# Patient Record
Sex: FEMALE | Race: WHITE | NOT HISPANIC OR LATINO | Employment: FULL TIME | URBAN - METROPOLITAN AREA
[De-identification: names, ages, dates, MRNs, and addresses within clinical notes are randomized per-mention and may not be internally consistent; named-entity substitution may affect disease eponyms.]

---

## 2017-03-09 ENCOUNTER — OFFICE VISIT (OUTPATIENT)
Dept: FAMILY MEDICINE | Facility: CLINIC | Age: 22
End: 2017-03-09
Payer: COMMERCIAL

## 2017-03-09 VITALS
TEMPERATURE: 97.9 F | HEIGHT: 71 IN | RESPIRATION RATE: 18 BRPM | BODY MASS INDEX: 29.68 KG/M2 | SYSTOLIC BLOOD PRESSURE: 111 MMHG | HEART RATE: 92 BPM | DIASTOLIC BLOOD PRESSURE: 67 MMHG | WEIGHT: 212 LBS | OXYGEN SATURATION: 98 %

## 2017-03-09 DIAGNOSIS — Z30.41 ORAL CONTRACEPTIVE USE: Primary | ICD-10-CM

## 2017-03-09 PROCEDURE — 99213 OFFICE O/P EST LOW 20 MIN: CPT | Performed by: FAMILY MEDICINE

## 2017-03-09 NOTE — PROGRESS NOTES
"  SUBJECTIVE:                                                    Cami Cardenas is a 21 year old female who presents to clinic today for the following health issues:  She is here to follow up on her OCP start.     Clinic on 12/19/16:  \"1. Epistaxis  Recurrent episodes, getting more frequent lately, no other issues with frequent bleeding or easy bruising or bleeding.   Will refer her to ENT for further evaluation. In the meantime, I encouraged her to continue to apply Vaseline to nares to help moisturize and avoid harsh blowing or sniffing.   - OTOLARYNGOLOGY REFERRAL  2. Encounter for initial prescription of contraceptives  She is interested in starting on an OC to help control her periods and cramping and for future birth control if needed. She has not been sexually active yet and no plans in the near future. We discussed the risks, benefits, side effects today. She would like to start OCP. Will start apri. She will plan on returning in 3 months to recheck bp after starting medication.\"      Since starting the BC she is having less cramps, a more even flow, and her periods all within placebo timeframe   Periods have been lating 5-6 days.  Denies headache, abdominal pain, or calf pain.      Problem list and histories reviewed & adjusted, as indicated.  Additional history: as documented    Patient Active Problem List   Diagnosis     Hematoma of leg     Sprain and strain of hip and thigh     Leg pain     Past Surgical History   Procedure Laterality Date     Dental surgery       dental implant       Social History   Substance Use Topics     Smoking status: Never Smoker     Smokeless tobacco: Never Used     Alcohol use No     Family History   Problem Relation Age of Onset     Arthritis Mother      High cholesterol Father          Current Outpatient Prescriptions   Medication Sig Dispense Refill     desogestrel-ethinyl estradiol (APRI) 0.15-30 MG-MCG per tablet Take 1 tablet by mouth daily 84 tablet 3     Allergies " "  Allergen Reactions     Minthoxtachys      Allergic to all mints     Recent Labs   Lab Test  08/14/14   1242   LDL  70   HDL  57   TRIG  92      BP Readings from Last 3 Encounters:   03/09/17 111/67   12/19/16 122/67   08/23/16 110/70    Wt Readings from Last 3 Encounters:   03/09/17 96.2 kg (212 lb)   12/19/16 93.4 kg (206 lb)   08/23/16 92.8 kg (204 lb 8 oz)         Reviewed and updated as needed this visit by clinical staff  Reviewed and updated as needed this visit by Provider    ROS:  See above.    This document serves as a record of the services and decisions personally performed and made by Cristy Pleitez MD. It was created on his/her behalf by Cynthia Busch, trained medical scribe. The creation of this document is based the provider's statements to the medical scribes.    Scribe Cynthia Busch, March 9, 2017  OBJECTIVE:                                                    /67 (BP Location: Left arm, Patient Position: Chair, Cuff Size: Adult Large)  Pulse 92  Temp 97.9  F (36.6  C) (Tympanic)  Resp 18  Ht 1.81 m (5' 11.25\")  Wt 96.2 kg (212 lb)  SpO2 98%  BMI 29.36 kg/m2  Body mass index is 29.36 kg/(m^2).  GENERAL: healthy, alert and no distress    Diagnostic Test Results:  none      ASSESSMENT/PLAN:                                                    1. Oral contraceptive use  She is tolerating the APRI well without any adverse side effects. It is helping control her periods and cramping. Her BP is well within normal range. She denies any headaches.       The information in this document, created by the medical scribe for me, accurately reflects the services I personally performed and the decisions made by me. I have reviewed and approved this document for accuracy prior to leaving the patient care area. 03/09/17    Cristy Pleitez MD  [facility]    "

## 2017-03-09 NOTE — MR AVS SNAPSHOT
"              After Visit Summary   3/9/2017    Cami Cardenas    MRN: [id number]           Patient Information     Date Of Birth          1995        Visit Information        Provider Department      3/9/2017 3:00 PM Cristy Pleitez MD [facility]        Today's Diagnoses     Oral contraceptive use    -  1       Follow-ups after your visit        Who to contact     If you have questions or need follow up information about today's clinic visit or your schedule please contact [facility] directly at [phone].  Normal or non-critical lab and imaging results will be communicated to you by [employer], letter or phone within 4 business days after the clinic has received the results. If you do not hear from us within 7 days, please contact the clinic through [employer] or phone. If you have a critical or abnormal lab result, we will notify you by phone as soon as possible.  Submit refill requests through [employer] or call your pharmacy and they will forward the refill request to us. Please allow 3 business days for your refill to be completed.          Additional Information About Your Visit        MyChart Information     [employer] gives you secure access to your electronic health record. If you see a primary care provider, you can also send messages to your care team and make appointments. If you have questions, please call your primary care clinic.  If you do not have a primary care provider, please call [phone] and they will assist you.        Care EveryWhere ID     This is your Care EveryWhere ID. This could be used by other organizations to access your [place] medical records  [id number]        Your Vitals Were     Pulse Temperature Respirations Height Pulse Oximetry BMI (Body Mass Index)    92 97.9  F (36.6  C) (Tympanic) 18 5' 11.25\" (1.81 m) 98% 29.36 kg/m2       Blood Pressure from Last 3 Encounters:   03/09/17 111/67   12/19/16 122/67   08/23/16 110/70    Weight from Last 3 " Encounters:   03/09/17 212 lb (96.2 kg)   12/19/16 206 lb (93.4 kg)   08/23/16 204 lb 8 oz (92.8 kg)              Today, you had the following     No orders found for display       Primary Care Provider Office Phone # Fax #    Cristy Pleitez [phone]       [facility] [address] [facility] [address]        Thank you!     Thank you for choosing [facility]  for your care. Our goal is always to provide you with excellent care. Hearing back from our patients is one way we can continue to improve our services. Please take a few minutes to complete the written survey that you may receive in the mail after your visit with us. Thank you!             Your Updated Medication List - Protect others around you: Learn how to safely use, store and throw away your medicines at www.disposemymeds.org.          This list is accurate as of: 3/9/17  4:26 PM.  Always use your most recent med list.                   Brand Name Dispense Instructions for use    desogestrel-ethinyl estradiol 0.15-30 MG-MCG per tablet    APRI    84 tablet    Take 1 tablet by mouth daily

## 2017-03-09 NOTE — NURSING NOTE
"Chief Complaint   Patient presents with     Recheck Medication     Birth control        Initial /67 (BP Location: Left arm, Patient Position: Chair, Cuff Size: Adult Large)  Pulse 92  Temp 97.9  F (36.6  C) (Tympanic)  Resp 18  Ht 5' 11.25\" (1.81 m)  Wt 212 lb (96.2 kg)  SpO2 98%  BMI 29.36 kg/m2 Estimated body mass index is 29.36 kg/(m^2) as calculated from the following:    Height as of this encounter: 5' 11.25\" (1.81 m).    Weight as of this encounter: 212 lb (96.2 kg).  Medication Reconciliation: complete       Elicia Casarez MA     "

## 2017-07-08 ENCOUNTER — HEALTH MAINTENANCE LETTER (OUTPATIENT)
Age: 22
End: 2017-07-08

## 2017-08-01 ENCOUNTER — OFFICE VISIT (OUTPATIENT)
Dept: FAMILY MEDICINE | Facility: CLINIC | Age: 22
End: 2017-08-01
Payer: COMMERCIAL

## 2017-08-01 VITALS
OXYGEN SATURATION: 97 % | WEIGHT: 229 LBS | HEART RATE: 124 BPM | BODY MASS INDEX: 32.06 KG/M2 | RESPIRATION RATE: 16 BRPM | SYSTOLIC BLOOD PRESSURE: 124 MMHG | HEIGHT: 71 IN | TEMPERATURE: 98.2 F | DIASTOLIC BLOOD PRESSURE: 75 MMHG

## 2017-08-01 DIAGNOSIS — M79.672 PAIN IN BOTH FEET: ICD-10-CM

## 2017-08-01 DIAGNOSIS — N94.6 DYSMENORRHEA: ICD-10-CM

## 2017-08-01 DIAGNOSIS — Z00.00 ROUTINE GENERAL MEDICAL EXAMINATION AT A HEALTH CARE FACILITY: Primary | ICD-10-CM

## 2017-08-01 DIAGNOSIS — M79.671 PAIN IN BOTH FEET: ICD-10-CM

## 2017-08-01 PROCEDURE — 99395 PREV VISIT EST AGE 18-39: CPT | Performed by: FAMILY MEDICINE

## 2017-08-01 RX ORDER — DESOGESTREL AND ETHINYL ESTRADIOL 0.15-0.03
1 KIT ORAL DAILY
Qty: 84 TABLET | Refills: 3 | Status: SHIPPED | OUTPATIENT
Start: 2017-08-01 | End: 2018-07-24

## 2017-08-01 NOTE — NURSING NOTE
"Chief Complaint   Patient presents with     Physical       Initial /75 (BP Location: Left arm, Patient Position: Sitting, Cuff Size: Adult Large)  Pulse 124  Temp 98.2  F (36.8  C) (Oral)  Resp 16  Ht 5' 11\" (1.803 m)  Wt 229 lb (103.9 kg)  LMP 07/06/2017  SpO2 97%  BMI 31.94 kg/m2 Estimated body mass index is 31.94 kg/(m^2) as calculated from the following:    Height as of this encounter: 5' 11\" (1.803 m).    Weight as of this encounter: 229 lb (103.9 kg).  Medication Reconciliation: complete     sma Gloria  "

## 2017-08-01 NOTE — PATIENT INSTRUCTIONS
1. If your foot pain becomes more bothersome, let me knowyou can schedule with our podiatrist here in clinic.  2. Schedule a visit in January for your PAP.      Preventive Health Recommendations  Female Ages 18 to 25     Yearly exam:     See your health care provider every year in order to  o Review health changes.   o Discuss preventive care.    o Review your medicines if your doctor has prescribed any.      You should be tested each year for STDs (sexually transmitted diseases).       After age 20, talk to your provider about how often you should have cholesterol testing.      Starting at age 21, get a Pap test every three years. If you have an abnormal result, your doctor may have you test more often.      If you are at risk for diabetes, you should have a diabetes test (fasting glucose).     Shots:     Get a flu shot each year.     Get a tetanus shot every 10 years.     Consider getting the shot (vaccine) that prevents cervical cancer (Gardasil).    Nutrition:     Eat at least 5 servings of fruits and vegetables each day.    Eat whole-grain bread, whole-wheat pasta and brown rice instead of white grains and rice.    Talk to your provider about Calcium and Vitamin D.     Lifestyle    Exercise at least 150 minutes a week each week (30 minutes a day, 5 days a week). This will help you control your weight and prevent disease.    Limit alcohol to one drink per day.    No smoking.     Wear sunscreen to prevent skin cancer.    See your dentist every six months for an exam and cleaning.

## 2017-08-01 NOTE — MR AVS SNAPSHOT
After Visit Summary   8/1/2017    Cami Cardenas    MRN: [id number]           Patient Information     Date Of Birth          1995        Visit Information        Provider Department      8/1/2017 3:20 PM Cristy Pleitez MD [facility]        Today's Diagnoses     Routine general medical examination at a health care facility    -  1    Pain in both feet        Dysmenorrhea          Care Instructions    1. If your foot pain becomes more bothersome, let me knowyou can schedule with our podiatrist here in clinic.  2. Schedule a visit in January for your PAP.      Preventive Health Recommendations  Female Ages 18 to 25     Yearly exam:     See your health care provider every year in order to  o Review health changes.   o Discuss preventive care.    o Review your medicines if your doctor has prescribed any.      You should be tested each year for STDs (sexually transmitted diseases).       After age 20, talk to your provider about how often you should have cholesterol testing.      Starting at age 21, get a Pap test every three years. If you have an abnormal result, your doctor may have you test more often.      If you are at risk for diabetes, you should have a diabetes test (fasting glucose).     Shots:     Get a flu shot each year.     Get a tetanus shot every 10 years.     Consider getting the shot (vaccine) that prevents cervical cancer (Gardasil).    Nutrition:     Eat at least 5 servings of fruits and vegetables each day.    Eat whole-grain bread, whole-wheat pasta and brown rice instead of white grains and rice.    Talk to your provider about Calcium and Vitamin D.     Lifestyle    Exercise at least 150 minutes a week each week (30 minutes a day, 5 days a week). This will help you control your weight and prevent disease.    Limit alcohol to one drink per day.    No smoking.     Wear sunscreen to prevent skin cancer.    See your dentist every six months for an exam and  "cleaning.          Follow-ups after your visit        Who to contact     If you have questions or need follow up information about today's clinic visit or your schedule please contact [facility] directly at [phone].  Normal or non-critical lab and imaging results will be communicated to you by MyChart, letter or phone within 4 business days after the clinic has received the results. If you do not hear from us within 7 days, please contact the clinic through [employer] or phone. If you have a critical or abnormal lab result, we will notify you by phone as soon as possible.  Submit refill requests through [employer] or call your pharmacy and they will forward the refill request to us. Please allow 3 business days for your refill to be completed.          Additional Information About Your Visit        MyChart Information     [employer] gives you secure access to your electronic health record. If you see a primary care provider, you can also send messages to your care team and make appointments. If you have questions, please call your primary care clinic.  If you do not have a primary care provider, please call [phone] and they will assist you.        Care EveryWhere ID     This is your Care EveryWhere ID. This could be used by other organizations to access your [place] medical records  [id number]        Your Vitals Were     Pulse Temperature Respirations Height Last Period Pulse Oximetry    124 98.2  F (36.8  C) (Oral) 16 5' 11\" (1.803 m) 07/06/2017 97%    BMI (Body Mass Index)                   31.94 kg/m2            Blood Pressure from Last 3 Encounters:   08/01/17 124/75   03/09/17 111/67   12/19/16 122/67    Weight from Last 3 Encounters:   08/01/17 229 lb (103.9 kg)   03/09/17 212 lb (96.2 kg)   12/19/16 206 lb (93.4 kg)              Today, you had the following     No orders found for display         Where to get your medicines      These medications were sent to [id number] - " [place], [place] - [facility] [address]  [address] [facility] [address]     Phone:  [phone]     desogestrel-ethinyl estradiol 0.15-30 MG-MCG per tablet          Primary Care Provider Office Phone # Fax #    Cristy Pleitez [phone]       [facility] [address]  [facility] [address]        Equal Access to Services     SHAY IVY : Hadii aad ku hadasho Soomaali, waaxda luqadaha, qaybta kaalmada adeegyada, waxay idiin hayaan adeeg kharaheraclio laamber . So [facility] [phone].    ATENCIÓN: Si habla español, tiene a carey disposición servicios gratuitos de asistencia lingüística. [facility] [phone].    We comply with applicable federal civil rights laws and [place] laws. We do not discriminate on the basis of race, color, national origin, age, disability sex, sexual orientation or gender identity.            Thank you!     Thank you for choosing [facility]  for your care. Our goal is always to provide you with excellent care. Hearing back from our patients is one way we can continue to improve our services. Please take a few minutes to complete the written survey that you may receive in the mail after your visit with us. Thank you!             Your Updated Medication List - Protect others around you: Learn how to safely use, store and throw away your medicines at www.disposemymeds.org.          This list is accurate as of: 8/1/17  3:41 PM.  Always use your most recent med list.                   Brand Name Dispense Instructions for use Diagnosis    desogestrel-ethinyl estradiol 0.15-30 MG-MCG per tablet    APRI    84 tablet    Take 1 tablet by mouth daily    Dysmenorrhea

## 2017-08-01 NOTE — PROGRESS NOTES
SUBJECTIVE:   CC: Cami Cardenas is an 22 year old woman who presents for preventive health visit.     Physical   Annual:     Getting at least 3 servings of Calcium per day::  Yes    Bi-annual eye exam::  Yes    Dental care twice a year::  Yes    Sleep apnea or symptoms of sleep apnea::  None    Diet::  Regular (no restrictions)    Frequency of exercise::  2-3 days/week    Duration of exercise::  15-30 minutes    Taking medications regularly::  Not Applicable    Additional concerns today::  YES  Answers for HPI/ROS submitted by the patient on 7/29/2017   PHQ-2 Score: 0      She has never been sexually active and would like to wait and do her PAP in January. Continues to take apri for dysmenorrhea and she is tolerating it well and her periods are much easier.     She is having random foot pain and is wondering if she should get shoe inserts.       Today's PHQ-2 Score:   PHQ-2 ( 1999 Pfizer) 8/1/2017   Q1: Little interest or pleasure in doing things 0   Q2: Feeling down, depressed or hopeless 0   PHQ-2 Score 0   Q1: Little interest or pleasure in doing things -   Q2: Feeling down, depressed or hopeless -   PHQ-2 Score -     Abuse: Current or Past(Physical, Sexual or Emotional)- No  Do you feel safe in your environment - Yes    Social History   Substance Use Topics     Smoking status: Never Smoker     Smokeless tobacco: Never Used     Alcohol use No     The patient does not drink >3 drinks per day nor >7 drinks per week.    Reviewed orders with patient.  Reviewed health maintenance and updated orders accordingly - Yes  BP Readings from Last 3 Encounters:   08/01/17 124/75   03/09/17 111/67   12/19/16 122/67    Wt Readings from Last 3 Encounters:   08/01/17 103.9 kg (229 lb)   03/09/17 96.2 kg (212 lb)   12/19/16 93.4 kg (206 lb)         Patient Active Problem List   Diagnosis     Hematoma of leg     Sprain and strain of hip and thigh     Leg pain     Past Surgical History:   Procedure Laterality Date     DENTAL  "SURGERY      dental implant       Social History   Substance Use Topics     Smoking status: Never Smoker     Smokeless tobacco: Never Used     Alcohol use No     Family History   Problem Relation Age of Onset     Arthritis Mother      High cholesterol Father          Current Outpatient Prescriptions   Medication Sig Dispense Refill     desogestrel-ethinyl estradiol (APRI) 0.15-30 MG-MCG per tablet Take 1 tablet by mouth daily 84 tablet 3     Allergies   Allergen Reactions     Minthoxtachys      Allergic to all mints     Recent Labs   Lab Test  08/14/14   1242   LDL  70   HDL  57   TRIG  92      Mammogram not appropriate for this patient based on age.  Pertinent mammograms are reviewed under the imaging tab.  History of abnormal Pap smear: NO - age 21-29 PAP every 3 years recommended  Last 3 Pap Results: No results found for: PAP  Reviewed and updated as needed this visit by clinical staff  Reviewed and updated as needed this visit by Provider  Past Medical History:   Diagnosis Date     NO ACTIVE PROBLEMS       Past Surgical History:   Procedure Laterality Date     DENTAL SURGERY      dental implant       ROS:   ROS: 10 point ROS neg other than the symptoms noted above in the HPI.     This document serves as a record of the services and decisions personally performed and made by Cristy Pleitez MD. It was created on his/her behalf by Cynthia Busch, trained medical scribe. The creation of this document is based the provider's statements to the medical scribes.    Scribe Cynthia Busch, August 1, 2017  OBJECTIVE:   /75 (BP Location: Left arm, Patient Position: Sitting, Cuff Size: Adult Large)  Pulse 124  Temp 98.2  F (36.8  C) (Oral)  Resp 16  Ht 1.803 m (5' 11\")  Wt 103.9 kg (229 lb)  LMP 07/06/2017  SpO2 97%  BMI 31.94 kg/m2  EXAM:  GENERAL: healthy, alert and no distress  EYES: Eyes grossly normal to inspection, PERRL and conjunctivae and sclerae normal  HENT: ear canals and TM's normal, nose and " "mouth without ulcers or lesions  NECK: no adenopathy, no asymmetry, masses, or scars and thyroid normal to palpation  RESP: lungs clear to auscultation - no rales, rhonchi or wheezes  CV: regular rate and rhythm, normal S1 S2, no S3 or S4, no murmur, click or rub, no peripheral edema and peripheral pulses strong  ABDOMEN: soft, nontender, no hepatosplenomegaly, no masses and bowel sounds normal  MS: no gross musculoskeletal defects noted, no edema  SKIN: no suspicious lesions or rashes  NEURO: Normal strength and tone, mentation intact and speech normal  PSYCH: mentation appears normal, affect normal/bright    ASSESSMENT/PLAN:   1. Routine general medical examination at a health care facility  She would like to delay pap for now and schedule PAP in January.   STD testing not indicated as she in not sexually active.    2. Pain in both feet  She will make sure she has shoes with good arch support, may try inserts. If persistent pain, will schedule with podiatry     3. Dysmenorrhea   Continues APRI.  - desogestrel-ethinyl estradiol (APRI) 0.15-30 MG-MCG per tablet; Take 1 tablet by mouth daily  Dispense: 84 tablet; Refill: 3      COUNSELING:  Reviewed preventive health counseling, as reflected in patient instructions   reports that she has never smoked. She has never used smokeless tobacco.  Estimated body mass index is 31.94 kg/(m^2) as calculated from the following:    Height as of this encounter: 1.803 m (5' 11\").    Weight as of this encounter: 103.9 kg (229 lb).   Weight management plan: diet and exercise    Counseling Resources:  ATP IV Guidelines  Pooled Cohorts Equation Calculator  Breast Cancer Risk Calculator  FRAX Risk Assessment  ICSI Preventive Guidelines  Dietary Guidelines for Americans, 2010  USDA's MyPlate  ASA Prophylaxis  Lung CA Screening    The information in this document, created by the medical scribe for me, accurately reflects the services I personally performed and the decisions made by me. I " have reviewed and approved this document for accuracy. 08/01/17    Cristy Pleitez MD  [facility]

## 2017-08-18 ENCOUNTER — TELEPHONE (OUTPATIENT)
Dept: FAMILY MEDICINE | Facility: CLINIC | Age: 22
End: 2017-08-18

## 2018-05-24 ENCOUNTER — OFFICE VISIT (OUTPATIENT)
Dept: URGENT CARE | Facility: URGENT CARE | Age: 23
End: 2018-05-24
Payer: COMMERCIAL

## 2018-05-24 VITALS
TEMPERATURE: 98.5 F | HEART RATE: 113 BPM | SYSTOLIC BLOOD PRESSURE: 148 MMHG | BODY MASS INDEX: 35.15 KG/M2 | OXYGEN SATURATION: 99 % | DIASTOLIC BLOOD PRESSURE: 88 MMHG | WEIGHT: 252 LBS

## 2018-05-24 DIAGNOSIS — H10.13 ALLERGIC CONJUNCTIVITIS, BILATERAL: Primary | ICD-10-CM

## 2018-05-24 DIAGNOSIS — J30.1 ACUTE SEASONAL ALLERGIC RHINITIS DUE TO POLLEN: ICD-10-CM

## 2018-05-24 PROCEDURE — 99213 OFFICE O/P EST LOW 20 MIN: CPT | Performed by: PHYSICIAN ASSISTANT

## 2018-05-24 RX ORDER — DIPHENHYDRAMINE HCL 25 MG
25-50 TABLET ORAL EVERY 6 HOURS PRN
Qty: 30 TABLET | Refills: 1 | Status: SHIPPED | OUTPATIENT
Start: 2018-05-24

## 2018-05-24 RX ORDER — OLOPATADINE HYDROCHLORIDE 1 MG/ML
1 SOLUTION/ DROPS OPHTHALMIC 2 TIMES DAILY
Qty: 5 ML | Refills: 3 | Status: SHIPPED | OUTPATIENT
Start: 2018-05-24 | End: 2019-08-14

## 2018-05-24 NOTE — MR AVS SNAPSHOT
After Visit Summary   5/24/2018    Cami Cardenas    MRN: [id number]           Patient Information     Date Of Birth          1995        Visit Information        Provider Department      5/24/2018 7:20 PM Pablo Navarrete PA-C [facility]        Today's Diagnoses     Allergic conjunctivitis, bilateral    -  1    Acute seasonal allergic rhinitis due to pollen           Follow-ups after your visit        Who to contact     If you have questions or need follow up information about today's clinic visit or your schedule please contact [facility] directly at [phone].  Normal or non-critical lab and imaging results will be communicated to you by [employer], letter or phone within 4 business days after the clinic has received the results. If you do not hear from us within 7 days, please contact the clinic through [employer] or phone. If you have a critical or abnormal lab result, we will notify you by phone as soon as possible.  Submit refill requests through [employer] or call your pharmacy and they will forward the refill request to us. Please allow 3 business days for your refill to be completed.          Additional Information About Your Visit        MyChart Information     [employer] gives you secure access to your electronic health record. If you see a primary care provider, you can also send messages to your care team and make appointments. If you have questions, please call your primary care clinic.  If you do not have a primary care provider, please call [phone] and they will assist you.        Care EveryWhere ID     This is your Care EveryWhere ID. This could be used by other organizations to access your [place] medical records  [id number]        Your Vitals Were     Pulse Temperature Pulse Oximetry BMI (Body Mass Index)          113 98.5  F (36.9  C) (Tympanic) 99% 35.15 kg/m2         Blood Pressure from Last 3 Encounters:   05/24/18 148/88    08/01/17 124/75   03/09/17 111/67    Weight from Last 3 Encounters:   05/24/18 252 lb (114.3 kg)   08/01/17 229 lb (103.9 kg)   03/09/17 212 lb (96.2 kg)              Today, you had the following     No orders found for display         Today's Medication Changes          These changes are accurate as of 5/24/18  7:56 PM.  If you have any questions, ask your nurse or doctor.               Start taking these medicines.        Dose/Directions    diphenhydrAMINE 25 MG tablet   Commonly known as:  BENADRYL   Used for:  Allergic conjunctivitis, bilateral   Started by:  Pablo Navarrete PA-C        Dose:  25-50 mg   Take 1-2 tablets (25-50 mg) by mouth every 6 hours as needed for itching or allergies   Quantity:  30 tablet   Refills:  1       olopatadine 0.1 % ophthalmic solution   Commonly known as:  PATANOL   Used for:  Allergic conjunctivitis, bilateral   Started by:  Pablo Navarrete PA-C        Dose:  1 drop   Place 1 drop into both eyes 2 times daily   Quantity:  5 mL   Refills:  3            Where to get your medicines      These medications were sent to [facility] [id number] [place] [address] AT [address] [address]     Phone:  [phone]     diphenhydrAMINE 25 MG tablet    olopatadine 0.1 % ophthalmic solution                Primary Care Provider Office Phone # Fax #    Cristy Pleitez -[phone]       [address]  [facility] [address]        Equal Access to Services     [facility] AH: Hadii aad ku hadasho Soomaali, waaxda luqadaha, qaybta kaalmada adeegyada, jose garcia. So [facility] [phone].    ATENCIÓN: Si rositala torsten, tiene a carey disposición servicios gratuitos de asistencia lingüística. Llame al [phone].    We comply with applicable federal civil rights laws and [place] laws. We do not discriminate on the basis of race, color, national origin, age, disability, sex, sexual orientation, or  gender identity.            Thank you!     Thank you for choosing [facility]  for your care. Our goal is always to provide you with excellent care. Hearing back from our patients is one way we can continue to improve our services. Please take a few minutes to complete the written survey that you may receive in the mail after your visit with us. Thank you!             Your Updated Medication List - Protect others around you: Learn how to safely use, store and throw away your medicines at www.disposemymeds.org.          This list is accurate as of 5/24/18  7:56 PM.  Always use your most recent med list.                   Brand Name Dispense Instructions for use Diagnosis    desogestrel-ethinyl estradiol 0.15-30 MG-MCG per tablet    APRI    84 tablet    Take 1 tablet by mouth daily    Dysmenorrhea       diphenhydrAMINE 25 MG tablet    BENADRYL    30 tablet    Take 1-2 tablets (25-50 mg) by mouth every 6 hours as needed for itching or allergies    Allergic conjunctivitis, bilateral       olopatadine 0.1 % ophthalmic solution    PATANOL    5 mL    Place 1 drop into both eyes 2 times daily    Allergic conjunctivitis, bilateral       XYZAL PO

## 2018-05-25 NOTE — PROGRESS NOTES
"SUBJECTIVE:  Chief Complaint:   Chief Complaint   Patient presents with     Urgent Care     Pt in clinic to have eval for bilateral itchy and red eyes.     Eye Problem     History of Present Illness:  Cami Cardenas is a 23 year old female who presents complaining of moderate both eyes redness, eyelid swelling, itching for 1 day(s) with allergy symptoms of nasal congestion for one week.   Onset/timing: sudden.    Associated Signs and Symptoms: nasal drainage  Treatment measures tried include: OTC \"get the red out drops\"  Contact wearer : Yes     Past Medical History:   Diagnosis Date     NO ACTIVE PROBLEMS      Current Outpatient Prescriptions   Medication Sig Dispense Refill     desogestrel-ethinyl estradiol (APRI) 0.15-30 MG-MCG per tablet Take 1 tablet by mouth daily 84 tablet 3     Levocetirizine Dihydrochloride (XYZAL PO)           ROS:  CONSTITUTIONAL:NEGATIVE for fever, chills, change in weight  EYES: POSITIVE for itching bilateral and redness bilateral  ENT/MOUTH: nasal congestion  RESP:NEGATIVE for significant cough or SOB    OBJECTIVE:  /88  Pulse 113  Temp 98.5  F (36.9  C) (Tympanic)  Wt 252 lb (114.3 kg)  SpO2 99%  BMI 35.15 kg/m2  General: no acute distress  Eye exam: right eye abnormal findings: conjunctivitis with erythema, left eye abnormal findings: conjunctivitis with erythema. Both upper and lower palpebrae mildly edematous  Ears: normal canals, TMs bilaterally, normal TM mobility  Nose: NORMAL - no drainage, turbinates normal in size.  Neck: supple, non-tender, free range of motion, no adenopathy    ASSESSMENT:    1. Allergic conjunctivitis, bilateral    - olopatadine (PATANOL) 0.1 % ophthalmic solution; Place 1 drop into both eyes 2 times daily  Dispense: 5 mL; Refill: 3  - diphenhydrAMINE (BENADRYL) 25 MG tablet; Take 1-2 tablets (25-50 mg) by mouth every 6 hours as needed for itching or allergies  Dispense: 30 tablet; Refill: 1    2. Acute seasonal allergic rhinitis due to " pollen      PLAN:  Cold packs for comfort. As above. Follow up primary clinic if not improving over the next week.   See orders in epic

## 2018-06-28 ENCOUNTER — TELEPHONE (OUTPATIENT)
Dept: FAMILY MEDICINE | Facility: CLINIC | Age: 23
End: 2018-06-28

## 2018-07-24 ENCOUNTER — MYC REFILL (OUTPATIENT)
Dept: FAMILY MEDICINE | Facility: CLINIC | Age: 23
End: 2018-07-24

## 2018-07-24 DIAGNOSIS — N94.6 DYSMENORRHEA: ICD-10-CM

## 2018-07-24 NOTE — TELEPHONE ENCOUNTER
"Requested Prescriptions   Pending Prescriptions Disp Refills     desogestrel-ethinyl estradiol (APRI) 0.15-30 MG-MCG per tablet  Last Written Prescription Date:  8/1/2017  Last Fill Quantity: 84 tablet,  # refills: 3   Last Office Visit: 5/24/2018  Future Office Visit:      84 tablet 3     Sig: Take 1 tablet by mouth daily    Contraceptives Protocol Passed    7/24/2018  9:46 AM       Passed - Patient is not a current smoker if age is 35 or older       Passed - Recent (12 mo) or future (30 days) visit within the authorizing provider's specialty    Patient had office visit in the last 12 months or has a visit in the next 30 days with authorizing provider or within the authorizing provider's specialty.  See \"Patient Info\" tab in inbasket, or \"Choose Columns\" in Meds & Orders section of the refill encounter.           Passed - No active pregnancy on record       Passed - No positive pregnancy test in past 12 months          "

## 2018-07-24 NOTE — TELEPHONE ENCOUNTER
Message from [employer]:  Original authorizing provider: Cristy Pleitez MD, MD Cami Cardenas would like a refill of the following medications:  desogestrel-ethinyl estradiol (APRI) 0.15-30 MG-MCG per tablet [Cristy Pleitez MD, MD]    Preferred pharmacy: [facility] [id number] [place] [address] - [address] AT [address]    Comment:  I will run out of my birth control before I can schedule my annual physical (insurances purposes and making sure its exactly a year out, etc.), so I am requesting it now so that I don't lose a month while I wait to make my appointment.

## 2018-07-26 RX ORDER — DESOGESTREL AND ETHINYL ESTRADIOL 0.15-0.03
1 KIT ORAL DAILY
Qty: 84 TABLET | Refills: 0 | Status: SHIPPED | OUTPATIENT
Start: 2018-07-26 | End: 2018-08-13

## 2018-07-26 NOTE — TELEPHONE ENCOUNTER
Prescription approved per [facility] Refill Protocol.  Brianna Snow RN

## 2018-08-13 ENCOUNTER — OFFICE VISIT (OUTPATIENT)
Dept: FAMILY MEDICINE | Facility: CLINIC | Age: 23
End: 2018-08-13
Payer: COMMERCIAL

## 2018-08-13 VITALS
BODY MASS INDEX: 35.7 KG/M2 | RESPIRATION RATE: 20 BRPM | OXYGEN SATURATION: 99 % | TEMPERATURE: 99.2 F | SYSTOLIC BLOOD PRESSURE: 122 MMHG | HEART RATE: 119 BPM | WEIGHT: 256 LBS | DIASTOLIC BLOOD PRESSURE: 73 MMHG

## 2018-08-13 DIAGNOSIS — Z23 ENCOUNTER FOR IMMUNIZATION: ICD-10-CM

## 2018-08-13 DIAGNOSIS — Z00.00 ENCOUNTER FOR ROUTINE ADULT HEALTH EXAMINATION WITHOUT ABNORMAL FINDINGS: Primary | ICD-10-CM

## 2018-08-13 DIAGNOSIS — N94.6 DYSMENORRHEA: ICD-10-CM

## 2018-08-13 PROCEDURE — 90471 IMMUNIZATION ADMIN: CPT | Performed by: FAMILY MEDICINE

## 2018-08-13 PROCEDURE — G0123 SCREEN CERV/VAG THIN LAYER: HCPCS | Performed by: FAMILY MEDICINE

## 2018-08-13 PROCEDURE — 90715 TDAP VACCINE 7 YRS/> IM: CPT | Performed by: FAMILY MEDICINE

## 2018-08-13 PROCEDURE — 99395 PREV VISIT EST AGE 18-39: CPT | Mod: 25 | Performed by: FAMILY MEDICINE

## 2018-08-13 RX ORDER — DESOGESTREL AND ETHINYL ESTRADIOL 0.15-0.03
1 KIT ORAL DAILY
Qty: 84 TABLET | Refills: 3 | Status: SHIPPED | OUTPATIENT
Start: 2018-08-13 | End: 2019-08-14

## 2018-08-13 NOTE — MR AVS SNAPSHOT
After Visit Summary   8/13/2018    Cami Cardenas    MRN: [id number]           Patient Information     Date Of Birth          1995        Visit Information        Provider Department      8/13/2018 2:40 PM Cristy Pleitez MD [facility]        Today's Diagnoses     Encounter for routine adult health examination without abnormal findings    -  1    Dysmenorrhea        Encounter for immunization          Care Instructions      Preventive Health Recommendations  Female Ages 21 to 25     Yearly exam:     See your health care provider every year in order to  o Review health changes.   o Discuss preventive care.    o Review your medicines if your doctor has prescribed any.      You should be tested each year for STDs (sexually transmitted diseases).       Talk to your provider about how often you should have cholesterol testing.      Get a Pap test every three years. If you have an abnormal result, your doctor may have you test more often.      If you are at risk for diabetes, you should have a diabetes test (fasting glucose).     Shots:     Get a flu shot each year.     Get a tetanus shot every 10 years.     Consider getting the shot (vaccine) that prevents cervical cancer (Gardasil).    Nutrition:     Eat at least 5 servings of fruits and vegetables each day.    Eat whole-grain bread, whole-wheat pasta and brown rice instead of white grains and rice.    Get adequate Calcium and Vitamin D.     Lifestyle    Exercise at least 150 minutes a week each week (30 minutes a day, 5 days a week). This will help you control your weight and prevent disease.    Limit alcohol to one drink per day.    No smoking.     Wear sunscreen to prevent skin cancer.    See your dentist every six months for an exam and cleaning.          Follow-ups after your visit        Who to contact     If you have questions or need follow up information about today's clinic visit or your schedule please contact [facility] directly at [phone].  Normal or non-critical lab and imaging results will be communicated to you by MyChart, letter or phone within 4 business days after the clinic has received the results. If you do not hear from us within 7 days, please contact the clinic through [employer] or phone. If you have a critical or abnormal lab result, we will notify you by phone as soon as possible.  Submit refill requests through [employer] or call your pharmacy and they will forward the refill request to us. Please allow 3 business days for your refill to be completed.          Additional Information About Your Visit        [employer] Information     [employer] gives you secure access to your electronic health record. If you see a primary care provider, you can also send messages to your care team and make appointments. If you have questions, please call your primary care clinic.  If you do not have a primary care provider, please call [phone] and they will assist you.        Care EveryWhere ID     This is your Care EveryWhere ID. This could be used by other organizations to access your [place] medical records  [id number]        Your Vitals Were     Pulse Temperature Respirations Last Period Pulse Oximetry BMI (Body Mass Index)    119 99.2  F (37.3  C) (Oral) 20 08/03/2018 (Exact Date) 99% 35.7 kg/m2       Blood Pressure from Last 3 Encounters:   08/13/18 122/73   05/24/18 148/88   08/01/17 124/75    Weight from Last 3 Encounters:   08/13/18 256 lb (116.1 kg)   05/24/18 252 lb (114.3 kg)   08/01/17 229 lb (103.9 kg)              We Performed the Following     Pap imaged thin layer screen only - recommended age 21 - 24 years     TDAP, IM (10 - 64 YRS) - Adacel          Where to get your medicines      These medications were sent to [facility] [address] [address] [address]     Phone:  [phone]     desogestrel-ethinyl estradiol  0.15-30 MG-MCG per tablet          Primary Care Provider Office Phone # Fax #    Cristy Pleitez [phone] [address] [facility] [address]        Equal Access to Services     SHAY IVY : Hadii joana ku ferno Sochristopherali, waaxda luqadaha, qaybta kaalmada adefaheemyada, jose tijerinan xander rose laSmitakelley garcia. So [facility] [phone].    ATENCIÓN: Si habla español, tiene a carey disposición servicios gratuitos de asistencia lingüística. Llame al [phone].    We comply with applicable federal civil rights laws and [place] laws. We do not discriminate on the basis of race, color, national origin, age, disability, sex, sexual orientation, or gender identity.            Thank you!     Thank you for choosing [facility]  for your care. Our goal is always to provide you with excellent care. Hearing back from our patients is one way we can continue to improve our services. Please take a few minutes to complete the written survey that you may receive in the mail after your visit with us. Thank you!             Your Updated Medication List - Protect others around you: Learn how to safely use, store and throw away your medicines at www.disposemymeds.org.          This list is accurate as of 8/13/18  3:22 PM.  Always use your most recent med list.                   Brand Name Dispense Instructions for use Diagnosis    desogestrel-ethinyl estradiol 0.15-30 MG-MCG per tablet    APRI    84 tablet    Take 1 tablet by mouth daily    Dysmenorrhea       diphenhydrAMINE 25 MG tablet    BENADRYL    30 tablet    Take 1-2 tablets (25-50 mg) by mouth every 6 hours as needed for itching or allergies    Allergic conjunctivitis, bilateral       olopatadine 0.1 % ophthalmic solution    PATANOL    5 mL    Place 1 drop into both eyes 2 times daily    Allergic conjunctivitis, bilateral       XYZAL PO

## 2018-08-13 NOTE — PROGRESS NOTES
SUBJECTIVE:   CC: Cami Cardenas is an 23 year old woman who presents for preventive health visit.     Physical   Annual:     Getting at least 3 servings of Calcium per day:  Yes    Bi-annual eye exam:  Yes    Dental care twice a year:  Yes    Sleep apnea or symptoms of sleep apnea:  None    Diet:  Regular (no restrictions)    Frequency of exercise:  1 day/week    Duration of exercise:  30-45 minutes    Taking medications regularly:  Yes    Medication side effects:  None    Additional concerns today:  No            Today's PHQ-2 Score:   PHQ-2 ( 1999 Pfizer) 8/6/2018   Q1: Little interest or pleasure in doing things 0   Q2: Feeling down, depressed or hopeless 0   PHQ-2 Score 0   Q1: Little interest or pleasure in doing things Not at all   Q2: Feeling down, depressed or hopeless Not at all   PHQ-2 Score 0       Abuse: Current or Past(Physical, Sexual or Emotional)- No  Do you feel safe in your environment - Yes    Social History   Substance Use Topics     Smoking status: Never Smoker     Smokeless tobacco: Never Used     Alcohol use No     Alcohol Use 8/6/2018   If you drink alcohol do you typically have greater than 3 drinks per day OR greater than 7 drinks per week? No   No flowsheet data found.    Reviewed orders with patient.  Reviewed health maintenance and updated orders accordingly - Yes  BP Readings from Last 3 Encounters:   08/13/18 122/73   05/24/18 148/88   08/01/17 124/75    Wt Readings from Last 3 Encounters:   08/13/18 256 lb (116.1 kg)   05/24/18 252 lb (114.3 kg)   08/01/17 229 lb (103.9 kg)                  Patient Active Problem List   Diagnosis     Hematoma of leg     Sprain and strain of hip and thigh     Leg pain     Past Surgical History:   Procedure Laterality Date     DENTAL SURGERY      dental implant       Social History   Substance Use Topics     Smoking status: Never Smoker     Smokeless tobacco: Never Used     Alcohol use No     Family History   Problem Relation Age of Onset      Arthritis Mother      High cholesterol Father          Current Outpatient Prescriptions   Medication Sig Dispense Refill     desogestrel-ethinyl estradiol (APRI) 0.15-30 MG-MCG per tablet Take 1 tablet by mouth daily 84 tablet 3     diphenhydrAMINE (BENADRYL) 25 MG tablet Take 1-2 tablets (25-50 mg) by mouth every 6 hours as needed for itching or allergies 30 tablet 1     Levocetirizine Dihydrochloride (XYZAL PO)        olopatadine (PATANOL) 0.1 % ophthalmic solution Place 1 drop into both eyes 2 times daily 5 mL 3     [DISCONTINUED] desogestrel-ethinyl estradiol (APRI) 0.15-30 MG-MCG per tablet Take 1 tablet by mouth daily 84 tablet 0     Allergies   Allergen Reactions     Dust Mites      Minthoxtachys      Allergic to all mints     Pollen Extract      Ragweeds      Recent Labs   Lab Test  08/14/14   1242   LDL  70   HDL  57   TRIG  92        Mammogram not appropriate for this patient based on age.    Pertinent mammograms are reviewed under the imaging tab.  History of abnormal Pap smear: NO - age 21-29 PAP every 3 years recommended     Reviewed and updated as needed this visit by clinical staff  Tobacco  Allergies  Med Hx  Surg Hx  Fam Hx  Soc Hx        Reviewed and updated as needed this visit by Provider        Past Medical History:   Diagnosis Date     NO ACTIVE PROBLEMS       Past Surgical History:   Procedure Laterality Date     DENTAL SURGERY      dental implant     Obstetric History     No data available          Review of Systems   ROS: 10 point ROS neg other than the symptoms noted above in the HPI.      OBJECTIVE:   /73 (BP Location: Left arm, Patient Position: Chair, Cuff Size: Adult Large)  Pulse 119  Temp 99.2  F (37.3  C) (Oral)  Resp 20  Wt 256 lb (116.1 kg)  LMP 08/03/2018 (Exact Date)  SpO2 99%  BMI 35.7 kg/m2  Physical Exam  GENERAL: healthy, alert and no distress  EYES: Eyes grossly normal to inspection, PERRL and conjunctivae and sclerae normal  HENT: ear canals and TM's  "normal, nose and mouth without ulcers or lesions  NECK: no adenopathy, no asymmetry, masses, or scars and thyroid normal to palpation  RESP: lungs clear to auscultation - no rales, rhonchi or wheezes  BREAST: normal without masses, tenderness or nipple discharge and no palpable axillary masses or adenopathy  CV: regular rate and rhythm, normal S1 S2, no S3 or S4, no murmur, click or rub, no peripheral edema and peripheral pulses strong  ABDOMEN: soft, nontender, no hepatosplenomegaly, no masses and bowel sounds normal   (female): normal female external genitalia, normal urethral meatus, vaginal mucosa pink, moist, well rugated, and normal cervix   MS: no gross musculoskeletal defects noted, no edema  SKIN: no suspicious lesions or rashes  NEURO: Normal strength and tone, mentation intact and speech normal  PSYCH: mentation appears normal, affect normal/bright    Diagnostic Test Results:  none     ASSESSMENT/PLAN:   1. Encounter for routine adult health examination without abnormal findings   pap today (low risk as she has never been sexually active)     2. Dysmenorrhea  Improved with OCP, though feels like she has more vaginal tightness or discomfort when inserting anything but small tampon and had some discomfort on exam today (otherwise normal pelvic exam)   - desogestrel-ethinyl estradiol (APRI) 0.15-30 MG-MCG per tablet; Take 1 tablet by mouth daily  Dispense: 84 tablet; Refill: 3    3. Encounter for immunization     - TDAP, IM (10 - 64 YRS) - Adacel    COUNSELING:  Reviewed preventive health counseling, as reflected in patient instructions       BP Readings from Last 1 Encounters:   08/13/18 122/73     Estimated body mass index is 35.7 kg/(m^2) as calculated from the following:    Height as of 8/1/17: 5' 11\" (1.803 m).    Weight as of this encounter: 256 lb (116.1 kg).  Wt Readings from Last 5 Encounters:   08/13/18 256 lb (116.1 kg)   05/24/18 252 lb (114.3 kg)   08/01/17 229 lb (103.9 kg)   03/09/17 212 lb " (96.2 kg)   12/19/16 206 lb (93.4 kg)        Weight management plan: diet and exercise     reports that she has never smoked. She has never used smokeless tobacco.      Counseling Resources:  ATP IV Guidelines  Pooled Cohorts Equation Calculator  Breast Cancer Risk Calculator  FRAX Risk Assessment  ICSI Preventive Guidelines  Dietary Guidelines for Americans, 2010  USDA's MyPlate  ASA Prophylaxis  Lung CA Screening    Cristy Pleitez MD   [facility]

## 2018-08-13 NOTE — NURSING NOTE
Screening Questionnaire for Adult Immunization     Are you sick today?   No    Do you have allergies to medications, food or any vaccine?   No    Have you ever had a serious reaction after receiving a vaccination?   No    Do you have a long-term health problem with heart disease, lung disease,  asthma, kidney disease, diabetes, anemia, metabolic or blood disease?   No    Do you have cancer, leukemia, AIDS, or any immune system problem?   No    Do you take cortisone, prednisone, other steroids, or anticancer drugs, or  have you had any x-ray (radiation) treatments?   No    Have you had a seizure, brain, or other nervous system problem?   No    During the past year, have you received a transfusion of blood or blood       products, or been given a medicine called immune (gamma) globulin?   No    For women: Are you pregnant or is there a chance you could become         pregnant during the next month?   No    Have you received any vaccinations in the past 4 weeks?   No     Immunization questionnaire answers were all negative.      MNVFC doesn't apply on this patient    Per orders of Dr. Pleitez, injection of Adacel tdap given by Nanette Tripp. Patient instructed to remain in clinic for 20 minutes afterwards, and to report any adverse reaction to me immediately.    Prior to injection verified patient identity using patient's name and date of birth.       Screening performed by Nanette Tripp, [facility]

## 2018-08-15 LAB
COPATH REPORT: NORMAL
PAP: NORMAL

## 2019-07-23 ENCOUNTER — TELEPHONE (OUTPATIENT)
Dept: FAMILY MEDICINE | Facility: CLINIC | Age: 24
End: 2019-07-23

## 2019-07-23 NOTE — TELEPHONE ENCOUNTER
Panel Management Review      Patient has the following on her problem list: None      Composite cancer screening  Chart review shows that this patient is due/due soon for the following None  Summary:    Patient is due/failing the following:   Chlamydia screening and PHYSICAL    Action needed:   Patient needs office visit for physical and screening.    Type of outreach:    Sent [employer] message. pt has appt in august, 2019    Questions for provider review:    None                                                                                                                                    Danielle Bermudez CMA on 7/23/2019 at 4:25 PM

## 2019-08-11 ASSESSMENT — ENCOUNTER SYMPTOMS
PARESTHESIAS: 0
PALPITATIONS: 0
HEADACHES: 0
ABDOMINAL PAIN: 0
NAUSEA: 0
EYE PAIN: 0
SORE THROAT: 0
JOINT SWELLING: 0
MYALGIAS: 0
FREQUENCY: 0
ARTHRALGIAS: 0
SHORTNESS OF BREATH: 0
DIARRHEA: 0
HEMATURIA: 0
WEAKNESS: 0
CONSTIPATION: 0
HEARTBURN: 0
DIZZINESS: 0
HEMATOCHEZIA: 0
FEVER: 0
CHILLS: 0
DYSURIA: 0
NERVOUS/ANXIOUS: 0
COUGH: 0
BREAST MASS: 0

## 2019-08-12 ASSESSMENT — ENCOUNTER SYMPTOMS
CONSTIPATION: 0
FREQUENCY: 0
PALPITATIONS: 0
DYSURIA: 0
MYALGIAS: 0
SHORTNESS OF BREATH: 0
PARESTHESIAS: 0
HEADACHES: 0
COUGH: 0
HEMATOCHEZIA: 0
BREAST MASS: 0
JOINT SWELLING: 0
WEAKNESS: 0
SORE THROAT: 0
EYE PAIN: 0
HEARTBURN: 0
DIARRHEA: 0
NERVOUS/ANXIOUS: 0
ABDOMINAL PAIN: 0
DIZZINESS: 0
FEVER: 0
CHILLS: 0
HEMATURIA: 0
ARTHRALGIAS: 0
NAUSEA: 0

## 2019-08-12 NOTE — PATIENT INSTRUCTIONS
Preventive Health Recommendations  Female Ages 21 to 25     Yearly exam:     See your health care provider every year in order to  o Review health changes.   o Discuss preventive care.    o Review your medicines if your doctor has prescribed any.      You should be tested each year for STDs (sexually transmitted diseases).       Talk to your provider about how often you should have cholesterol testing.      Get a Pap test every three years. If you have an abnormal result, your doctor may have you test more often.      If you are at risk for diabetes, you should have a diabetes test (fasting glucose).     Shots:     Get a flu shot each year.     Get a tetanus shot every 10 years.     Consider getting the shot (vaccine) that prevents cervical cancer (Gardasil).    Nutrition:     Eat at least 5 servings of fruits and vegetables each day.    Eat whole-grain bread, whole-wheat pasta and brown rice instead of white grains and rice.    Get adequate Calcium and Vitamin D.     Lifestyle    Exercise at least 150 minutes a week each week (30 minutes a day, 5 days a week). This will help you control your weight and prevent disease.    Limit alcohol to one drink per day.    No smoking.     Wear sunscreen to prevent skin cancer.    See your dentist every six months for an exam and cleaning.  Patient Education     Understanding Hyperhidrosis  Hyperhidrosis is excessive sweating. It s often an ongoing (chronic) condition. Sweating is a normal process. It helps manage body temperature and other processes of the body. But excessive sweating is more than is needed to do this. The symptoms can start when you re a child and continue into adulthood.  How to say it  hy-per-hy-DROH-sis   What causes hyperhidrosis?  In most cases, the cause isn t known. It may be because of a problem with how the nervous system responds to stress. In some cases, it may be caused by another health condition or a medicine. This is known as secondary  hyperhidrosis.  Symptoms of hyperhidrosis  The main symptom of hyperhidrosis is heavy sweating that:  Can cause problems with daily activities and social events  Happens during the day but not at night  May happen with no physical activity  The sweating occurs most often in any or all of these areas:  Bottoms of the feet  Palms  Underarms  In some cases, it may also occur in these areas:  Face  Groin  Scalp  Under the breasts  Treatment for hyperhidrosis  Treatment choices include:  Antiperspirant. An antiperspirant that has 10% to 15% aluminum chloride can block sweat glands and help stop sweating. It comes in creams, sticks, gels, and sprays. You can buy antiperspirant at a drugstore. Or your healthcare provider may give you a stronger prescription antiperspirant that has 20% aluminum chloride. Antiperspirant should be applied to dry skin at night before bed. It needs to be applied every night for a week or two, and then once or twice a week or as needed. This can irritate the skin for some people.  Botulinum toxin. This is a type of medicine. The medicine is injected into the areas with sweat glands. This can help prevent sweat glands from working normally for a few months. You ll need to get injections every few months.  Iontophoresis. This treatment uses electricity to block sweat glands. Moist pads are put on the skin, or your hands or feet are placed in shallow water. Chemicals may be added to the water. An electrical current is sent through fluid. The process is done several times a week until sweating is reduced, and then once a week or as advised.  Surgery. In severe cases, surgery can be done to remove your sweat glands. Or surgery can be done to cut the nerves that send signals to the sweat glands. Either of these types of surgery can stop sweat permanently.  But this can lead to compensatory hyperhidrosis. This means you will start sweating from another part of your body.  Treating another health  condition, or changing a medicine. A health condition or a medicine can cause secondary hyperhidrosis. This can be managed by treating the health condition, or by a change in medicine. Your healthcare provider will talk with you about these options if you have secondary hyperhidrosis.  Oral medicines. There are medicines that can be taken by mouth that will help reduce sweating.  Possible complications of hyperhidrosis  You may have skin problems in the areas where you sweat. The skin may become moist, pale, swollen, and soft enough to rub away easily. This is known as skin maceration. It can lead to loss of skin, pain, and skin infection. You can help prevent this problem by treating your hyperhidrosis and keeping your skin dry as much as possible.  Living with hyperhidrosis  Hyperhidrosis may be caused by or made worse by emotional stress and heat. It can also cause problems with work and social life. You may have stains on your clothes, and not want to shake hands with people. It can be upsetting to cope with the problems of excess sweat. Talk with your healthcare provider about the following:  Support groups  How to prevent skin maceration  Other ways to manage your condition long-term  When to call your healthcare provider  Call your healthcare provider right away if you have any of these:  Symptoms that don t get better, or get worse  New symptoms   Date Last Reviewed: 5/1/2016 2000-2018 [employer]. [address] [place] [address], [place] [address]. All rights reserved. This information is not intended as a substitute for professional medical care. Always follow your healthcare professional's instructions.

## 2019-08-12 NOTE — PROGRESS NOTES
SUBJECTIVE:   CC: Cami Cardenas is an 24 year old woman who presents for preventive health visit.     Healthy Habits:     Getting at least 3 servings of Calcium per day:  Yes    Bi-annual eye exam:  Yes    Dental care twice a year:  Yes    Sleep apnea or symptoms of sleep apnea:  None    Diet:  Regular (no restrictions)    Frequency of exercise:  2-3 days/week    Duration of exercise:  15-30 minutes    Taking medications regularly:  Yes    Medication side effects:  None    PHQ-2 Total Score: 0    Additional concerns today:  Yes    She would like to switch her birth control.  She is also been having some toe pain.    She went to urgent care last may and got some help with allergies for pink eye. Got eye drops. They were helping and she is out.     Wants to try a different birth control pill. Still cramp periods on the April. Still uncomfortable to use tampons. She looked up her symptoms online and from what she could find she thought maybe secondary vaginismus.     Got a fitbit in December and is doing some tracking. Her heart rate has always been on the high side. Her resting heart rate is typically in the 80s to 90s.     She is more physically active now than she used to be. Is a  at the [employer]. Going up and down stairs. Doing at least an hour of exercise a day. Noticed higher heart rate since high school. Not really changing.    Tired of having issues with axillary sweating. Very smelly. The smell is hard to get out of her clothing. She has tried a number of different anti-persperants and they have not helped.     Last concern is her toe. She has been putting this off. In March, she was clearing the alley of ice. She was moving ice chunks and one of them jammed her big toe on the right foot. Since that time, she has mostly had pain when she stubs her toe or bends it. If not wearing socks, barefoot it might hurt. No swelling. No bruising.     Today's PHQ-2 Score:   PHQ-2 ( 1999 Pfizer) 8/11/2019    Q1: Little interest or pleasure in doing things 0   Q2: Feeling down, depressed or hopeless 0   PHQ-2 Score 0   Q1: Little interest or pleasure in doing things Not at all   Q2: Feeling down, depressed or hopeless Not at all   PHQ-2 Score 0       Abuse: Current or Past(Physical, Sexual or Emotional)- No  Do you feel safe in your environment? Yes    Social History     Tobacco Use     Smoking status: Never Smoker     Smokeless tobacco: Never Used   Substance Use Topics     Alcohol use: No     If you drink alcohol do you typically have >3 drinks per day or >7 drinks per week? No     No flowsheet data found.    Reviewed orders with patient.  Reviewed health maintenance and updated orders accordingly - Yes  BP Readings from Last 3 Encounters:   08/14/19 120/82   08/13/18 122/73   05/24/18 148/88    Wt Readings from Last 3 Encounters:   08/14/19 118.8 kg (262 lb)   08/13/18 116.1 kg (256 lb)   05/24/18 114.3 kg (252 lb)                  Patient Active Problem List   Diagnosis     Hematoma of leg     Sprain and strain of hip and thigh     Leg pain     Past Surgical History:   Procedure Laterality Date     DENTAL SURGERY      dental implant       Social History     Tobacco Use     Smoking status: Never Smoker     Smokeless tobacco: Never Used   Substance Use Topics     Alcohol use: No     Family History   Problem Relation Age of Onset     Arthritis Mother      High cholesterol Father          Current Outpatient Medications   Medication Sig Dispense Refill     aluminum chloride (DRYSOL) 20 % external solution Apply topically At Bedtime 60 mL 3     desogestrel-ethinyl estradiol (APRI) 0.15-30 MG-MCG tablet Take 1 tablet by mouth daily 84 tablet 3     diphenhydrAMINE (BENADRYL) 25 MG tablet Take 1-2 tablets (25-50 mg) by mouth every 6 hours as needed for itching or allergies 30 tablet 1     olopatadine (PATANOL) 0.1 % ophthalmic solution Place 1 drop into both eyes 2 times daily 1 Bottle 11     olopatadine (PATANOL) 0.1 %  ophthalmic solution Place 1 drop into both eyes 2 times daily 5 mL 3     Levocetirizine Dihydrochloride (XYZAL PO)        Allergies   Allergen Reactions     Dust Mites      Minthoxtachys      Allergic to all mints     Pollen Extract      Ragweeds      Recent Labs   Lab Test 08/14/14  1242   LDL 70   HDL 57   TRIG 92        Mammogram not appropriate for this patient based on age.    Pertinent mammograms are reviewed under the imaging tab.  History of abnormal Pap smear: NO - age 21-29 PAP every 3 years recommended  PAP / HPV 8/13/2018   PAP NIL     Reviewed and updated as needed this visit by clinical staff  Tobacco  Allergies  Med Hx  Surg Hx  Fam Hx  Soc Hx        Reviewed and updated as needed this visit by Provider        Past Medical History:   Diagnosis Date     NO ACTIVE PROBLEMS       Past Surgical History:   Procedure Laterality Date     DENTAL SURGERY      dental implant       Review of Systems   Constitutional: Negative for chills and fever.   HENT: Negative for congestion, ear pain, hearing loss and sore throat.    Eyes: Negative for pain and visual disturbance.   Respiratory: Negative for cough and shortness of breath.    Cardiovascular: Negative for chest pain, palpitations and peripheral edema.   Gastrointestinal: Negative for abdominal pain, constipation, diarrhea, heartburn, hematochezia and nausea.   Breasts:  Negative for tenderness, breast mass and discharge.   Genitourinary: Negative for dysuria, frequency, genital sores, hematuria, pelvic pain, urgency, vaginal bleeding and vaginal discharge.   Musculoskeletal: Negative for arthralgias, joint swelling and myalgias.   Skin: Negative for rash.   Neurological: Negative for dizziness, weakness, headaches and paresthesias.   Psychiatric/Behavioral: Negative for mood changes. The patient is not nervous/anxious.            OBJECTIVE:   /82 (BP Location: Left arm, Patient Position: Sitting, Cuff Size: Adult Regular)   Pulse 99   Temp 99.1  " F (37.3  C) (Oral)   Resp 18   Ht 1.803 m (5' 11\")   Wt 118.8 kg (262 lb)   LMP 07/01/2019 (Exact Date)   SpO2 100%   BMI 36.54 kg/m     Wt Readings from Last 5 Encounters:   08/14/19 118.8 kg (262 lb)   08/13/18 116.1 kg (256 lb)   05/24/18 114.3 kg (252 lb)   08/01/17 103.9 kg (229 lb)   03/09/17 96.2 kg (212 lb)      Physical Exam  GENERAL: healthy, alert and no distress  EYES: Eyes grossly normal to inspection, PERRL and conjunctivae and sclerae normal  HENT: ear canals and TM's normal, nose and mouth without ulcers or lesions  NECK: no adenopathy, no asymmetry, masses, or scars and thyroid normal to palpation  RESP: lungs clear to auscultation - no rales, rhonchi or wheezes  BREAST: normal without masses, tenderness or nipple discharge and no palpable axillary masses or adenopathy  CV: regular rate and rhythm, normal S1 S2, no S3 or S4, no murmur, click or rub, no peripheral edema and peripheral pulses strong  ABDOMEN: soft, nontender, no hepatosplenomegaly, no masses and bowel sounds normal  MS: no gross musculoskeletal defects noted, no edema  SKIN: no suspicious lesions or rashes  NEURO: Normal strength and tone, mentation intact and speech normal  PSYCH: mentation appears normal, affect normal/bright    Diagnostic Test Results:  Labs reviewed in Epic    ASSESSMENT/PLAN:   1. Routine general medical examination at a health care facility   pap due in 2021  - Glucose; Future  - Lipid panel reflex to direct LDL Fasting; Future    2. Allergic conjunctivitis, bilateral   patanol was helpful for her symptoms previously. Not currently having symptoms. Refilled for future use prn.   - olopatadine (PATANOL) 0.1 % ophthalmic solution; Place 1 drop into both eyes 2 times daily  Dispense: 1 Bottle; Refill: 11    3. Hyperhidrosis of axilla  Will check TSH today. See pt instructions. She would like to try drysol. Discussed instructions for use. If persistent despite drysol or if intolerant due to skin irritation, " "will schedule with derm and could discuss other possible treatments such as botox.   - DERMATOLOGY REFERRAL  - aluminum chloride (DRYSOL) 20 % external solution; Apply topically At Bedtime  Dispense: 60 mL; Refill: 3  - TSH with free T4 reflex    4. Dysmenorrhea  Improved, but not resolved with current OCP.   - desogestrel-ethinyl estradiol (APRI) 0.15-30 MG-MCG tablet; Take 1 tablet by mouth daily  Dispense: 84 tablet; Refill: 3  - OB/GYN REFERRAL    5. Vaginal discomfort  Symptoms of vaginismus with tampon insertion. She has never been sexually active. She wonders whether a different choice of OCP would be helpful. I recommended discussing options with gyn.  Referred to gyn.     6. Pain of toe of right foot  Wants to avoid additional visit for now with podiatry. Encouraged firm soled shoe over the coming weeks, ice if pain is flaring, let us know if worsening. If persistent symptoms, recommended scheduling with podiatry.      COUNSELING:  Reviewed preventive health counseling, as reflected in patient instructions    Estimated body mass index is 36.54 kg/m  as calculated from the following:    Height as of this encounter: 1.803 m (5' 11\").    Weight as of this encounter: 118.8 kg (262 lb).    Weight management plan: Discussed healthy diet and exercise guidelines     reports that she has never smoked. She has never used smokeless tobacco.      Counseling Resources:  ATP IV Guidelines  Pooled Cohorts Equation Calculator  Breast Cancer Risk Calculator  FRAX Risk Assessment  ICSI Preventive Guidelines  Dietary Guidelines for Americans, 2010  USDA's MyPlate  ASA Prophylaxis  Lung CA Screening    Cristy Pleitez MD, MD  [facility]  "

## 2019-08-14 ENCOUNTER — OFFICE VISIT (OUTPATIENT)
Dept: FAMILY MEDICINE | Facility: CLINIC | Age: 24
End: 2019-08-14
Payer: COMMERCIAL

## 2019-08-14 VITALS
WEIGHT: 262 LBS | OXYGEN SATURATION: 100 % | TEMPERATURE: 99.1 F | DIASTOLIC BLOOD PRESSURE: 82 MMHG | HEIGHT: 71 IN | SYSTOLIC BLOOD PRESSURE: 120 MMHG | HEART RATE: 99 BPM | RESPIRATION RATE: 18 BRPM | BODY MASS INDEX: 36.68 KG/M2

## 2019-08-14 DIAGNOSIS — M79.674 PAIN OF TOE OF RIGHT FOOT: ICD-10-CM

## 2019-08-14 DIAGNOSIS — N94.6 DYSMENORRHEA: ICD-10-CM

## 2019-08-14 DIAGNOSIS — L74.510 HYPERHIDROSIS OF AXILLA: ICD-10-CM

## 2019-08-14 DIAGNOSIS — Z00.00 ROUTINE GENERAL MEDICAL EXAMINATION AT A HEALTH CARE FACILITY: Primary | ICD-10-CM

## 2019-08-14 DIAGNOSIS — H10.13 ALLERGIC CONJUNCTIVITIS, BILATERAL: ICD-10-CM

## 2019-08-14 DIAGNOSIS — N94.9 VAGINAL DISCOMFORT: ICD-10-CM

## 2019-08-14 PROCEDURE — 99214 OFFICE O/P EST MOD 30 MIN: CPT | Mod: 25 | Performed by: FAMILY MEDICINE

## 2019-08-14 PROCEDURE — 84443 ASSAY THYROID STIM HORMONE: CPT | Performed by: FAMILY MEDICINE

## 2019-08-14 PROCEDURE — 99395 PREV VISIT EST AGE 18-39: CPT | Performed by: FAMILY MEDICINE

## 2019-08-14 PROCEDURE — 36415 COLL VENOUS BLD VENIPUNCTURE: CPT | Performed by: FAMILY MEDICINE

## 2019-08-14 RX ORDER — OLOPATADINE HYDROCHLORIDE 1 MG/ML
1 SOLUTION/ DROPS OPHTHALMIC 2 TIMES DAILY
Qty: 1 BOTTLE | Refills: 11 | Status: SHIPPED | OUTPATIENT
Start: 2019-08-14 | End: 2020-05-29

## 2019-08-14 RX ORDER — DESOGESTREL AND ETHINYL ESTRADIOL 0.15-0.03
1 KIT ORAL DAILY
Qty: 84 TABLET | Refills: 3 | Status: SHIPPED | OUTPATIENT
Start: 2019-08-14 | End: 2020-04-15

## 2019-08-14 ASSESSMENT — MIFFLIN-ST. JEOR: SCORE: 2039.55

## 2019-08-15 LAB — TSH SERPL DL<=0.005 MIU/L-ACNC: 0.84 MU/L (ref 0.4–4)

## 2019-08-15 NOTE — RESULT ENCOUNTER NOTE
Excellent! Please call or sent a [employer] message if you have any questions. Cristy Pleitez M.D.

## 2019-09-04 ENCOUNTER — OFFICE VISIT (OUTPATIENT)
Dept: OBGYN | Facility: CLINIC | Age: 24
End: 2019-09-04
Payer: COMMERCIAL

## 2019-09-04 VITALS
BODY MASS INDEX: 36.4 KG/M2 | SYSTOLIC BLOOD PRESSURE: 132 MMHG | DIASTOLIC BLOOD PRESSURE: 90 MMHG | HEART RATE: 110 BPM | WEIGHT: 261 LBS | OXYGEN SATURATION: 98 %

## 2019-09-04 DIAGNOSIS — R10.2 VAGINAL PAIN: ICD-10-CM

## 2019-09-04 DIAGNOSIS — Z00.00 ROUTINE GENERAL MEDICAL EXAMINATION AT A HEALTH CARE FACILITY: ICD-10-CM

## 2019-09-04 DIAGNOSIS — N92.4 EXCESSIVE BLEEDING IN PREMENOPAUSAL PERIOD: Primary | ICD-10-CM

## 2019-09-04 LAB
CHOLEST SERPL-MCNC: 192 MG/DL
GLUCOSE SERPL-MCNC: 93 MG/DL (ref 70–99)
HDLC SERPL-MCNC: 61 MG/DL
LDLC SERPL CALC-MCNC: 105 MG/DL
NONHDLC SERPL-MCNC: 131 MG/DL
TRIGL SERPL-MCNC: 130 MG/DL

## 2019-09-04 PROCEDURE — 36415 COLL VENOUS BLD VENIPUNCTURE: CPT | Performed by: FAMILY MEDICINE

## 2019-09-04 PROCEDURE — 99203 OFFICE O/P NEW LOW 30 MIN: CPT | Performed by: OBSTETRICS & GYNECOLOGY

## 2019-09-04 PROCEDURE — 80061 LIPID PANEL: CPT | Performed by: FAMILY MEDICINE

## 2019-09-04 PROCEDURE — 82947 ASSAY GLUCOSE BLOOD QUANT: CPT | Performed by: FAMILY MEDICINE

## 2019-09-04 NOTE — PROGRESS NOTES
SUBJECTIVE:  Cami Cardenas is a 24 year old female who presents to evaluate heavy vaginal bleeding.  She began her current oral contraceptive pills (Apri) December 2016, in an attempt to improve painful heavy periods.  She has had some success, but still has quite heavy menses, can require a pad or tampon changes every 3 hours.  She had continuous bleeding and cramping all through the month of May.  She has found that inserting a tampon has become increasingly uncomfortable, when she had a speculum examination last year (Pap was normal 8/13/2018) she had discomfort at the speculum and away she had not previously experienced.  She wonders about vaginismus.  She is not sexually active.    OBJECTIVE: BP (!) 132/90   Pulse 110   Wt 118.4 kg (261 lb)   LMP 08/29/2019   SpO2 98%   Breastfeeding? No   BMI 36.40 kg/m       Pelvic exam:  External genitalia appeared normal without lesion.  Urethral meatus, urethra, bladder, perineum, and anus appeared normal. Cervix and vagina appeared normal, without lesion.  Bimanual exam revealed a normal sized non-tender uterus, with no adnexal masses.  Rectovaginal deferred.   She was able to tolerate a narrow adolescent speculum with minimal discomfort, tolerated a single digit internal exam also with minimal discomfort, but did display some suggestion of vaginismus.    ASSESSMENT: Menorrhagia.  Vaginal pain.    PLAN: We discussed the above.  She will schedule a pelvic ultrasound and will then return to clinic to reevaluate.  We did not review oral contraceptive risk factors and would need to do this if she does decide to continue the pills.  We discussed treatment of vaginismus with dilators, use of lubricants which might be helpful.    Please note greater than 50% of this 30 minute appointment were spent in counseling with the patient of the issues described above in the history of present illness and in the plan, including evaluation and managment of menorrhagia,  vulvovaginal pain.

## 2019-09-05 NOTE — RESULT ENCOUNTER NOTE
The results of your recent lipid (cholesterol) profile were normal for your age.    Here are the results:  Lab Results       Component                Value               Date                       CHOL                     192                 09/04/2019            Lab Results       Component                Value               Date                       HDL                      61                  09/04/2019            Lab Results       Component                Value               Date                       LDL                      105                 09/04/2019            Lab Results       Component                Value               Date                       TRIG                     130                 09/04/2019            Lab Results       Component                Value               Date                       CHOLHDLRATIO             2.5                 08/14/2014              Desired or goal levels are:  CHOLESTEROL: Desirable is less than 200.   HDL (Good Cholesterol): Desirable is greater than 40 (for men) greater than 50 (for women).  LDL (Bad Cholesterol): Desirable is less than 130 (or less than 100 if you have heart disease or diabetes). Borderline 130-160.  TRIGLYCERIDES: Desirable is less than 150.  Borderline is 150-200.    Keep up the good work!.    As you may know, an elevated cholesterol is one factor that increases your risk for heart disease and stroke. You can improve your cholesterol by controlling the amount and type of fat you eat and by increasing your daily activity level.    Here are some ways to improve your nutrition:  Eat less fat (especially butter, Crisco and other saturated fats)  Buy lean cuts of meat, reduce your portions of red meat or substitute poultry or fish  Use skim milk and low-fat dairy products  Eat no more than 4 egg yolks per week  Avoid fried or fast foods that are high in fat  Eat more fruits and vegetables      Also consider starting or increasing your aerobic  activity. Aerobic activity is the best way to improve HDL (good) cholesterol. If this would be new to you, please talk with me first about what activities are safe for you.      Other lab results:    Your glucose (blood sugar) was normal.      Please feel free to contact us with any questions or if you would like more information.    Cristy Pleitez M.D.

## 2019-09-17 ENCOUNTER — ANCILLARY PROCEDURE (OUTPATIENT)
Dept: ULTRASOUND IMAGING | Facility: CLINIC | Age: 24
End: 2019-09-17
Attending: OBSTETRICS & GYNECOLOGY
Payer: COMMERCIAL

## 2019-09-17 DIAGNOSIS — N92.4 EXCESSIVE BLEEDING IN PREMENOPAUSAL PERIOD: ICD-10-CM

## 2019-09-17 PROCEDURE — 76856 US EXAM PELVIC COMPLETE: CPT | Performed by: OBSTETRICS & GYNECOLOGY

## 2019-09-26 ENCOUNTER — OFFICE VISIT (OUTPATIENT)
Dept: OBGYN | Facility: CLINIC | Age: 24
End: 2019-09-26
Payer: COMMERCIAL

## 2019-09-26 VITALS
WEIGHT: 267 LBS | BODY MASS INDEX: 37.24 KG/M2 | HEART RATE: 150 BPM | DIASTOLIC BLOOD PRESSURE: 81 MMHG | SYSTOLIC BLOOD PRESSURE: 124 MMHG | OXYGEN SATURATION: 98 %

## 2019-09-26 DIAGNOSIS — N92.4 EXCESSIVE BLEEDING IN PREMENOPAUSAL PERIOD: Primary | ICD-10-CM

## 2019-09-26 PROCEDURE — 99214 OFFICE O/P EST MOD 30 MIN: CPT | Performed by: OBSTETRICS & GYNECOLOGY

## 2019-09-26 NOTE — PROGRESS NOTES
SUBJECTIVE:  Cami Cardenas is a 24 year old female who presents to evaluate abnormal bleeding.  See prior GYN note.  She has been using cyclic oral contraceptive pills, but periods continue to be heavy with quite a bit of cramping prior to menses.  Question of endometriosis has been raised.  She has no risk factors to preclude use of combination oral contraceptive pills.    We reviewed the ultrasound done 9/17/2019.  The uterus was normal.  The endometrium measured 3.7 mm.  Both ovaries were normal..    OBJECTIVE: /81   Pulse 150   Wt 121.1 kg (267 lb)   LMP 09/26/2019   SpO2 98%   BMI 37.24 kg/m   Patient was not otherwise examined.      ASSESSMENT: Menorrhagia, dysmenorrhea.    PLAN: We discussed options available for treatment.  We discussed endometriosis.  She will begin taking the oral contraceptive pills continuously rather than cyclically, and will observe over the next couple of months.  If this does not seem to be helpful (or if an irregular bleeding pattern is too concerning) would consider Depo-Provera.  She will let me know if she wishes to change in treatment.    Please note greater than 50% of this 25 minute appointment were spent in counseling with the patient of the issues described above in the history of present illness and in the plan, including evaluation and managment of menorrhagia, dysmenorrhea.

## 2019-10-30 ENCOUNTER — ALLIED HEALTH/NURSE VISIT (OUTPATIENT)
Dept: NURSING | Facility: CLINIC | Age: 24
End: 2019-10-30
Payer: COMMERCIAL

## 2019-10-30 DIAGNOSIS — Z23 NEED FOR PROPHYLACTIC VACCINATION AND INOCULATION AGAINST INFLUENZA: Primary | ICD-10-CM

## 2019-10-30 PROCEDURE — 99207 ZZC NO CHARGE NURSE ONLY: CPT

## 2019-10-30 PROCEDURE — 90471 IMMUNIZATION ADMIN: CPT

## 2019-10-30 PROCEDURE — 90686 IIV4 VACC NO PRSV 0.5 ML IM: CPT

## 2020-04-24 ENCOUNTER — TELEPHONE (OUTPATIENT)
Dept: OBGYN | Facility: CLINIC | Age: 25
End: 2020-04-24

## 2020-04-24 NOTE — TELEPHONE ENCOUNTER
Prior Authorization Retail Medication Request    Medication/Dose: desogestrel-ethinyl estradiol (APRI) 0.15-30 MG-MCG tablet; Take 1 tablet by mouth daily Active tablets only to prevent menses   ICD code (if different than what is on RX): Dysmenorrhea [N94.6]   Previously Tried and Failed: Apri Taken cyclically  Rationale:  Patient has been taking the oral contraceptive pills continuously rather than cyclically since 9/26/19    Insurance Name:  [employer]  Insurance ID: [id number]      Pharmacy Information (if different than what is on RX)  Name:  [facility]  Phone:  [phone]

## 2020-04-24 NOTE — TELEPHONE ENCOUNTER
Prior Authorization Not Needed per Insurance    Medication: desogestrel-ethinyl estradiol (APRI) 0.15-30 MG-MCG tablet  Insurance Company: [employer] - Phone [phone] Fax [phone]  Expected CoPay:      Pharmacy Filling the Rx: [id number] - [place], [place] - [facility] [facility] [address] [facility]  Pharmacy Notified: Yes  Patient Notified: Yes    Pharmacy filled an 84 day supply of 112 tablets in March.  Currently refill too soon

## 2020-04-24 NOTE — TELEPHONE ENCOUNTER
TC to patient. A new prescription was sent on 4/15/2020 with different directions for patient to take active tablets only to prevent menses. Pt has only had a prescription for 84 tabs prior to this. The PA is to get the insurance to cover the new prescription as patient needs to take tablets continuously. Please submit prior auth for this reason. Pt is going to run out of current tablets before the insurance will refill next prescription. That is why a new prescription was sent with different directions.     Chitra Jordan, ROMIE-BSN

## 2020-04-24 NOTE — TELEPHONE ENCOUNTER
Insurance states pharmacy will need to do a one time override to allow it to fill earlier.  Per insurance the medication is covered and shouldn't need a PA going forward with new directions. Spoke with pharmacy and they will contact insurance company

## 2020-05-28 ENCOUNTER — MYC MEDICAL ADVICE (OUTPATIENT)
Dept: FAMILY MEDICINE | Facility: CLINIC | Age: 25
End: 2020-05-28

## 2020-05-28 DIAGNOSIS — N94.6 DYSMENORRHEA: ICD-10-CM

## 2020-05-28 DIAGNOSIS — H10.13 ALLERGIC CONJUNCTIVITIS, BILATERAL: ICD-10-CM

## 2020-05-29 RX ORDER — DESOGESTREL AND ETHINYL ESTRADIOL 0.15-0.03
1 KIT ORAL DAILY
Qty: 112 TABLET | Refills: 0 | Status: SHIPPED | OUTPATIENT
Start: 2020-05-29 | End: 2020-09-30

## 2020-05-29 RX ORDER — OLOPATADINE HYDROCHLORIDE 1 MG/ML
1 SOLUTION/ DROPS OPHTHALMIC 2 TIMES DAILY
Qty: 1 BOTTLE | Refills: 3 | Status: SHIPPED | OUTPATIENT
Start: 2020-05-29 | End: 2020-09-30

## 2020-10-19 ASSESSMENT — ENCOUNTER SYMPTOMS
HEMATURIA: 0
EYE PAIN: 0
ARTHRALGIAS: 0
JOINT SWELLING: 0
CONSTIPATION: 0
SHORTNESS OF BREATH: 0
HEMATOCHEZIA: 0
MYALGIAS: 0
HEADACHES: 0
NERVOUS/ANXIOUS: 0
BREAST MASS: 0
NAUSEA: 0
PALPITATIONS: 0
FREQUENCY: 0
ABDOMINAL PAIN: 0
CHILLS: 0
DIZZINESS: 0
SORE THROAT: 0
FEVER: 0
PARESTHESIAS: 0
DYSURIA: 0
DIARRHEA: 0
HEARTBURN: 0
COUGH: 0
WEAKNESS: 0

## 2020-10-19 NOTE — PATIENT INSTRUCTIONS
Preventive Health Recommendations  Female Ages 21 to 25     Yearly exam:     See your health care provider every year in order to  o Review health changes.   o Discuss preventive care.    o Review your medicines if your doctor has prescribed any.      You should be tested each year for STDs (sexually transmitted diseases).       Talk to your provider about how often you should have cholesterol testing.      Get a Pap test every three years. If you have an abnormal result, your doctor may have you test more often.      If you are at risk for diabetes, you should have a diabetes test (fasting glucose).     Shots:     Get a flu shot each year.     Get a tetanus shot every 10 years.     Consider getting the shot (vaccine) that prevents cervical cancer (Gardasil).    Nutrition:     Eat at least 5 servings of fruits and vegetables each day.    Eat whole-grain bread, whole-wheat pasta and brown rice instead of white grains and rice.    Get adequate Calcium and Vitamin D.     Lifestyle    Exercise at least 150 minutes a week each week (30 minutes a day, 5 days a week). This will help you control your weight and prevent disease.    Limit alcohol to one drink per day.    No smoking.     Wear sunscreen to prevent skin cancer.    See your dentist every six months for an exam and cleaning.

## 2020-10-20 ENCOUNTER — OFFICE VISIT (OUTPATIENT)
Dept: FAMILY MEDICINE | Facility: CLINIC | Age: 25
End: 2020-10-20
Payer: COMMERCIAL

## 2020-10-20 VITALS
BODY MASS INDEX: 41.02 KG/M2 | SYSTOLIC BLOOD PRESSURE: 136 MMHG | WEIGHT: 293 LBS | HEART RATE: 106 BPM | TEMPERATURE: 98 F | OXYGEN SATURATION: 96 % | HEIGHT: 71 IN | DIASTOLIC BLOOD PRESSURE: 82 MMHG

## 2020-10-20 DIAGNOSIS — E66.813 CLASS 3 SEVERE OBESITY IN ADULT, UNSPECIFIED BMI, UNSPECIFIED OBESITY TYPE, UNSPECIFIED WHETHER SERIOUS COMORBIDITY PRESENT (H): ICD-10-CM

## 2020-10-20 DIAGNOSIS — L74.510 HYPERHIDROSIS OF AXILLA: ICD-10-CM

## 2020-10-20 DIAGNOSIS — E66.01 CLASS 3 SEVERE OBESITY IN ADULT, UNSPECIFIED BMI, UNSPECIFIED OBESITY TYPE, UNSPECIFIED WHETHER SERIOUS COMORBIDITY PRESENT (H): ICD-10-CM

## 2020-10-20 DIAGNOSIS — Z00.00 ROUTINE GENERAL MEDICAL EXAMINATION AT A HEALTH CARE FACILITY: ICD-10-CM

## 2020-10-20 DIAGNOSIS — E66.01 MORBID OBESITY (H): ICD-10-CM

## 2020-10-20 DIAGNOSIS — Z00.00 ENCOUNTER FOR ROUTINE ADULT HEALTH EXAMINATION WITHOUT ABNORMAL FINDINGS: Primary | ICD-10-CM

## 2020-10-20 DIAGNOSIS — N94.6 DYSMENORRHEA: ICD-10-CM

## 2020-10-20 PROCEDURE — 99395 PREV VISIT EST AGE 18-39: CPT | Performed by: FAMILY MEDICINE

## 2020-10-20 PROCEDURE — 99213 OFFICE O/P EST LOW 20 MIN: CPT | Mod: 25 | Performed by: FAMILY MEDICINE

## 2020-10-20 RX ORDER — DESOGESTREL AND ETHINYL ESTRADIOL 0.15-0.03
1 KIT ORAL DAILY
Qty: 112 TABLET | Refills: 11 | Status: SHIPPED | OUTPATIENT
Start: 2020-10-20 | End: 2020-12-13

## 2020-10-20 ASSESSMENT — ENCOUNTER SYMPTOMS
BREAST MASS: 0
HEMATOCHEZIA: 0
CHILLS: 0
EYE PAIN: 0
HEADACHES: 0
NAUSEA: 0
PALPITATIONS: 0
FREQUENCY: 0
DIZZINESS: 0
DYSURIA: 0
FEVER: 0
HEARTBURN: 0
HEMATURIA: 0
SORE THROAT: 0
CONSTIPATION: 0
NERVOUS/ANXIOUS: 0
MYALGIAS: 0
PARESTHESIAS: 0
COUGH: 0
ARTHRALGIAS: 0
SHORTNESS OF BREATH: 0
JOINT SWELLING: 0
DIARRHEA: 0
ABDOMINAL PAIN: 0
WEAKNESS: 0

## 2020-10-20 ASSESSMENT — MIFFLIN-ST. JEOR: SCORE: 2265.89

## 2020-10-20 NOTE — PROGRESS NOTES
SUBJECTIVE:   CC: Cami Cardenas is an 25 year old woman who presents for preventive health visit.     Patient has been advised of split billing requirements and indicates understanding: Yes  Healthy Habits:     Getting at least 3 servings of Calcium per day:  Yes    Bi-annual eye exam:  Yes    Dental care twice a year:  Yes    Sleep apnea or symptoms of sleep apnea:  None    Diet:  Regular (no restrictions)    Frequency of exercise:  1 day/week    Duration of exercise:  15-30 minutes    Taking medications regularly:  Yes    Medication side effects:  None    PHQ-2 Total Score: 0    Additional concerns today:  No      Today's PHQ-2 Score:   PHQ-2 ( 1999 Pfizer) 10/19/2020   Q1: Little interest or pleasure in doing things 0   Q2: Feeling down, depressed or hopeless 0   PHQ-2 Score 0   Q1: Little interest or pleasure in doing things Not at all   Q2: Feeling down, depressed or hopeless Not at all   PHQ-2 Score 0       Abuse: Current or Past (Physical, Sexual or Emotional) - No  Do you feel safe in your environment? Yes        Social History     Tobacco Use     Smoking status: Never Smoker     Smokeless tobacco: Never Used   Substance Use Topics     Alcohol use: No     If you drink alcohol do you typically have >3 drinks per day or >7 drinks per week? No     No flowsheet data found.    Reviewed orders with patient.  Reviewed health maintenance and updated orders accordingly - Yes  BP Readings from Last 3 Encounters:   10/20/20 136/82   09/26/19 124/81   09/04/19 (!) 132/90    Wt Readings from Last 3 Encounters:   10/20/20 142 kg (313 lb)   09/26/19 121.1 kg (267 lb)   09/04/19 118.4 kg (261 lb)                  Patient Active Problem List   Diagnosis     Hematoma of leg     Sprain and strain of hip and thigh     Leg pain     Past Surgical History:   Procedure Laterality Date     DENTAL SURGERY      dental implant       Social History     Tobacco Use     Smoking status: Never Smoker     Smokeless tobacco: Never Used    Substance Use Topics     Alcohol use: No     Family History   Problem Relation Age of Onset     Arthritis Mother      High cholesterol Father          Current Outpatient Medications   Medication Sig Dispense Refill     aluminum chloride (DRYSOL) 20 % external solution Apply topically At Bedtime 60 mL 3     desogestrel-ethinyl estradiol (APRI) 0.15-30 MG-MCG tablet Take 1 tablet by mouth daily Take active tablets only to prevent menses 112 tablet 0     diphenhydrAMINE (BENADRYL) 25 MG tablet Take 1-2 tablets (25-50 mg) by mouth every 6 hours as needed for itching or allergies 30 tablet 1     olopatadine (PATANOL) 0.1 % ophthalmic solution Place 1 drop into both eyes 2 times daily 1 Bottle 0     Levocetirizine Dihydrochloride (XYZAL PO)        Allergies   Allergen Reactions     Dust Mites      Minthoxtachys      Allergic to all mints     Pollen Extract      Ragweeds      Recent Labs   Lab Test 09/04/19  1005 08/14/19  1124 08/14/14  1242   *  --  70   HDL 61  --  57   TRIG 130  --  92   TSH  --  0.84  --         Mammogram not appropriate for this patient based on age.    Pertinent mammograms are reviewed under the imaging tab.  History of abnormal Pap smear: NO - age 21-29 PAP every 3 years recommended  PAP / HPV 8/13/2018   PAP NIL     Reviewed and updated as needed this visit by clinical staff  Tobacco  Allergies  Meds              Reviewed and updated as needed this visit by Provider                Past Medical History:   Diagnosis Date     NO ACTIVE PROBLEMS       Past Surgical History:   Procedure Laterality Date     DENTAL SURGERY      dental implant       Review of Systems   Constitutional: Negative for chills and fever.   HENT: Negative for congestion, ear pain, hearing loss and sore throat.    Eyes: Negative for pain and visual disturbance.   Respiratory: Negative for cough and shortness of breath.    Cardiovascular: Negative for chest pain, palpitations and peripheral edema.   Gastrointestinal:  "Negative for abdominal pain, constipation, diarrhea, heartburn, hematochezia and nausea.   Breasts:  Negative for tenderness, breast mass and discharge.   Genitourinary: Negative for dysuria, frequency, genital sores, hematuria, pelvic pain, urgency, vaginal bleeding and vaginal discharge.   Musculoskeletal: Negative for arthralgias, joint swelling and myalgias.   Skin: Negative for rash.   Neurological: Negative for dizziness, weakness, headaches and paresthesias.   Psychiatric/Behavioral: Negative for mood changes. The patient is not nervous/anxious.       Has been cutting back on sugars. Drinking more water. Eating less candy. Tries to have veggies with her meals. Goes on walks. Running is still annoying because of her old injury in her quad. Unfortunately, the elliptical she has at her house her knees do not like it.      OBJECTIVE:   /82 (BP Location: Left arm, Patient Position: Sitting, Cuff Size: Adult Large)   Pulse 106   Temp 98  F (36.7  C) (Temporal)   Ht 1.803 m (5' 11\")   Wt 142 kg (313 lb)   SpO2 96%   BMI 43.65 kg/m     Wt Readings from Last 5 Encounters:   10/20/20 142 kg (313 lb)   09/26/19 121.1 kg (267 lb)   09/04/19 118.4 kg (261 lb)   08/14/19 118.8 kg (262 lb)   08/13/18 116.1 kg (256 lb)      Physical Exam  GENERAL: healthy, alert and no distress  EYES: Eyes grossly normal to inspection, PERRL and conjunctivae and sclerae normal  HENT: ear canals and TM's normal, nose and mouth without ulcers or lesions  NECK: no adenopathy, no asymmetry, masses, or scars and thyroid normal to palpation  RESP: lungs clear to auscultation - no rales, rhonchi or wheezes  CV: regular rate and rhythm, normal S1 S2, no S3 or S4, no murmur, click or rub, no peripheral edema and peripheral pulses strong  ABDOMEN: soft, nontender, no hepatosplenomegaly, no masses and bowel sounds normal  MS: no gross musculoskeletal defects noted, no edema  SKIN: no suspicious lesions or rashes  NEURO: Normal strength and " tone, mentation intact and speech normal  PSYCH: mentation appears normal, affect normal/bright    Diagnostic Test Results:  Labs reviewed in Epic    ASSESSMENT/PLAN:     1. Routine general medical examination at a health care facility   pap due in 8/2021  She got her flu shot at [facility]  - Glucose   - Lipid panel reflex to direct LDL Fasting      2. Allergic conjunctivitis, bilateral   patanol was helpful for her symptoms previously. No refills needed today. Stable.      3. Hyperhidrosis of axilla  drysol was not really helpful and she had to stop using it because it was irritating to her skin. Has been using witch hazel recently, not sure if helping yet. She will consider derm referral if needed in the future.     Will check TSH today. See pt instructions. She would like to try drysol. Discussed instructions for use. If persistent despite drysol or if intolerant due to skin irritation, will schedule with derm and could discuss other possible treatments such as botox.   - DERMATOLOGY REFERRAL  - aluminum chloride (DRYSOL) 20 % external solution; Apply topically At Bedtime  Dispense: 60 mL; Refill: 3  - TSH with free T4 reflex     4. Dysmenorrhea  Improved with continuous OCP. Refilled Apri today with instructions to take continuously.      5. Vaginal discomfort   unchanged  Symptoms of vaginismus with tampon insertion. She has never been sexually active. She wonders whether a different choice of OCP would be helpful. I recommended discussing options with gyn.  Referred to gyn.      6. Pain of toe of right foot  -- bothers her sometimes but only if she stubs her toe.   Wants to avoid additional visit for now with podiatry.  If persistent symptoms, recommended scheduling with podiatry.     7. Obesity  Gradual weight gain this past year.   Referred to 24 week healthy lifestyle program.     Patient has been advised of split billing requirements and indicates understanding: Yes  COUNSELING:  Reviewed preventive  "health counseling, as reflected in patient instructions    Estimated body mass index is 43.65 kg/m  as calculated from the following:    Height as of this encounter: 1.803 m (5' 11\").    Weight as of this encounter: 142 kg (313 lb).    Weight management plan: Discussed healthy diet and exercise guidelines Specific weight management program called 24 week healthy lifestyle program discussed    She reports that she has never smoked. She has never used smokeless tobacco.      Counseling Resources:  ATP IV Guidelines  Pooled Cohorts Equation Calculator  Breast Cancer Risk Calculator  BRCA-Related Cancer Risk Assessment: FHS-7 Tool  FRAX Risk Assessment  ICSI Preventive Guidelines  Dietary Guidelines for Americans, 2010  USDA's MyPlate  ASA Prophylaxis  Lung CA Screening    Cristy Pleitez MD, MD  [facility]  "

## 2021-08-17 DIAGNOSIS — N94.6 DYSMENORRHEA: ICD-10-CM

## 2021-08-19 RX ORDER — DESOGESTREL AND ETHINYL ESTRADIOL 0.15-0.03
KIT ORAL
Qty: 112 TABLET | Refills: 0 | Status: SHIPPED | OUTPATIENT
Start: 2021-08-19 | End: 2021-11-10

## 2021-08-19 NOTE — TELEPHONE ENCOUNTER
Last refill due in for [facility] visit 10/2021, please call and schedule.    Thank you

## 2021-09-12 ENCOUNTER — HEALTH MAINTENANCE LETTER (OUTPATIENT)
Age: 26
End: 2021-09-12

## 2021-09-23 ENCOUNTER — MYC MEDICAL ADVICE (OUTPATIENT)
Dept: FAMILY MEDICINE | Facility: CLINIC | Age: 26
End: 2021-09-23

## 2021-09-24 NOTE — TELEPHONE ENCOUNTER
My Chart response sent.  Saundra Martinez RN  [facility]

## 2021-11-01 ENCOUNTER — MYC MEDICAL ADVICE (OUTPATIENT)
Dept: FAMILY MEDICINE | Facility: CLINIC | Age: 26
End: 2021-11-01

## 2021-11-03 ASSESSMENT — ENCOUNTER SYMPTOMS
NERVOUS/ANXIOUS: 0
HEARTBURN: 0
COUGH: 0
DIARRHEA: 0
MYALGIAS: 0
EYE PAIN: 0
NAUSEA: 0
BREAST MASS: 0
CONSTIPATION: 0
ARTHRALGIAS: 0
CHILLS: 0
DYSURIA: 0
SORE THROAT: 0
WEAKNESS: 0
JOINT SWELLING: 0
PARESTHESIAS: 0
FEVER: 0
HEMATOCHEZIA: 0
HEMATURIA: 0
SHORTNESS OF BREATH: 0
FREQUENCY: 0
PALPITATIONS: 0
HEADACHES: 0
ABDOMINAL PAIN: 0
DIZZINESS: 0

## 2021-11-10 ENCOUNTER — OFFICE VISIT (OUTPATIENT)
Dept: FAMILY MEDICINE | Facility: CLINIC | Age: 26
End: 2021-11-10
Payer: COMMERCIAL

## 2021-11-10 VITALS
HEIGHT: 71 IN | RESPIRATION RATE: 16 BRPM | DIASTOLIC BLOOD PRESSURE: 80 MMHG | SYSTOLIC BLOOD PRESSURE: 122 MMHG | BODY MASS INDEX: 41.02 KG/M2 | TEMPERATURE: 97.6 F | OXYGEN SATURATION: 96 % | WEIGHT: 293 LBS | HEART RATE: 102 BPM

## 2021-11-10 DIAGNOSIS — E66.813 CLASS 3 SEVERE OBESITY IN ADULT, UNSPECIFIED BMI, UNSPECIFIED OBESITY TYPE, UNSPECIFIED WHETHER SERIOUS COMORBIDITY PRESENT (H): ICD-10-CM

## 2021-11-10 DIAGNOSIS — Z00.00 ROUTINE GENERAL MEDICAL EXAMINATION AT A HEALTH CARE FACILITY: Primary | ICD-10-CM

## 2021-11-10 DIAGNOSIS — Z11.4 SCREENING FOR HIV (HUMAN IMMUNODEFICIENCY VIRUS): ICD-10-CM

## 2021-11-10 DIAGNOSIS — Z13.220 LIPID SCREENING: ICD-10-CM

## 2021-11-10 DIAGNOSIS — N94.6 DYSMENORRHEA: ICD-10-CM

## 2021-11-10 DIAGNOSIS — E66.01 CLASS 3 SEVERE OBESITY IN ADULT, UNSPECIFIED BMI, UNSPECIFIED OBESITY TYPE, UNSPECIFIED WHETHER SERIOUS COMORBIDITY PRESENT (H): ICD-10-CM

## 2021-11-10 DIAGNOSIS — Z11.59 NEED FOR HEPATITIS C SCREENING TEST: ICD-10-CM

## 2021-11-10 DIAGNOSIS — Z13.1 SCREENING FOR DIABETES MELLITUS: ICD-10-CM

## 2021-11-10 LAB
CHOLEST SERPL-MCNC: 194 MG/DL
FASTING STATUS PATIENT QL REPORTED: ABNORMAL
FASTING STATUS PATIENT QL REPORTED: NORMAL
GLUCOSE BLD-MCNC: 86 MG/DL (ref 70–99)
HCV AB SERPL QL IA: NONREACTIVE
HDLC SERPL-MCNC: 58 MG/DL
HIV 1+2 AB+HIV1 P24 AG SERPL QL IA: NONREACTIVE
LDLC SERPL CALC-MCNC: 110 MG/DL
NONHDLC SERPL-MCNC: 136 MG/DL
TRIGL SERPL-MCNC: 132 MG/DL

## 2021-11-10 PROCEDURE — G0145 SCR C/V CYTO,THINLAYER,RESCR: HCPCS | Performed by: FAMILY MEDICINE

## 2021-11-10 PROCEDURE — 36415 COLL VENOUS BLD VENIPUNCTURE: CPT | Performed by: FAMILY MEDICINE

## 2021-11-10 PROCEDURE — 86803 HEPATITIS C AB TEST: CPT | Performed by: FAMILY MEDICINE

## 2021-11-10 PROCEDURE — 99395 PREV VISIT EST AGE 18-39: CPT | Performed by: FAMILY MEDICINE

## 2021-11-10 PROCEDURE — 82947 ASSAY GLUCOSE BLOOD QUANT: CPT | Performed by: FAMILY MEDICINE

## 2021-11-10 PROCEDURE — 80061 LIPID PANEL: CPT | Performed by: FAMILY MEDICINE

## 2021-11-10 PROCEDURE — 87389 HIV-1 AG W/HIV-1&-2 AB AG IA: CPT | Performed by: FAMILY MEDICINE

## 2021-11-10 RX ORDER — DESOGESTREL AND ETHINYL ESTRADIOL 0.15-0.03
1 KIT ORAL DAILY
Qty: 112 TABLET | Refills: 3 | Status: SHIPPED | OUTPATIENT
Start: 2021-11-10 | End: 2022-11-02

## 2021-11-10 SDOH — ECONOMIC STABILITY: TRANSPORTATION INSECURITY
IN THE PAST 12 MONTHS, HAS THE LACK OF TRANSPORTATION KEPT YOU FROM MEDICAL APPOINTMENTS OR FROM GETTING MEDICATIONS?: NO

## 2021-11-10 SDOH — ECONOMIC STABILITY: FOOD INSECURITY: WITHIN THE PAST 12 MONTHS, YOU WORRIED THAT YOUR FOOD WOULD RUN OUT BEFORE YOU GOT MONEY TO BUY MORE.: NEVER TRUE

## 2021-11-10 SDOH — ECONOMIC STABILITY: INCOME INSECURITY: HOW HARD IS IT FOR YOU TO PAY FOR THE VERY BASICS LIKE FOOD, HOUSING, MEDICAL CARE, AND HEATING?: NOT HARD AT ALL

## 2021-11-10 SDOH — ECONOMIC STABILITY: FOOD INSECURITY: WITHIN THE PAST 12 MONTHS, THE FOOD YOU BOUGHT JUST DIDN'T LAST AND YOU DIDN'T HAVE MONEY TO GET MORE.: NEVER TRUE

## 2021-11-10 SDOH — HEALTH STABILITY: PHYSICAL HEALTH: ON AVERAGE, HOW MANY DAYS PER WEEK DO YOU ENGAGE IN MODERATE TO STRENUOUS EXERCISE (LIKE A BRISK WALK)?: 2 DAYS

## 2021-11-10 SDOH — ECONOMIC STABILITY: TRANSPORTATION INSECURITY
IN THE PAST 12 MONTHS, HAS LACK OF TRANSPORTATION KEPT YOU FROM MEETINGS, WORK, OR FROM GETTING THINGS NEEDED FOR DAILY LIVING?: NO

## 2021-11-10 SDOH — HEALTH STABILITY: PHYSICAL HEALTH: ON AVERAGE, HOW MANY MINUTES DO YOU ENGAGE IN EXERCISE AT THIS LEVEL?: 30 MIN

## 2021-11-10 SDOH — ECONOMIC STABILITY: INCOME INSECURITY: IN THE LAST 12 MONTHS, WAS THERE A TIME WHEN YOU WERE NOT ABLE TO PAY THE MORTGAGE OR RENT ON TIME?: NO

## 2021-11-10 ASSESSMENT — ENCOUNTER SYMPTOMS
PALPITATIONS: 0
NERVOUS/ANXIOUS: 0
SORE THROAT: 0
PARESTHESIAS: 0
ARTHRALGIAS: 0
WEAKNESS: 0
CHILLS: 0
HEMATOCHEZIA: 0
JOINT SWELLING: 0
SHORTNESS OF BREATH: 0
DIZZINESS: 0
ABDOMINAL PAIN: 0
HEARTBURN: 0
CONSTIPATION: 0
BREAST MASS: 0
DYSURIA: 0
EYE PAIN: 0
NAUSEA: 0
FREQUENCY: 0
MYALGIAS: 0
HEADACHES: 0
DIARRHEA: 0
FEVER: 0
COUGH: 0
HEMATURIA: 0

## 2021-11-10 ASSESSMENT — SOCIAL DETERMINANTS OF HEALTH (SDOH)
HOW OFTEN DO YOU ATTEND CHURCH OR RELIGIOUS SERVICES?: NEVER
DO YOU BELONG TO ANY CLUBS OR ORGANIZATIONS SUCH AS CHURCH GROUPS UNIONS, FRATERNAL OR ATHLETIC GROUPS, OR SCHOOL GROUPS?: NO
ARE YOU MARRIED, WIDOWED, DIVORCED, SEPARATED, NEVER MARRIED, OR LIVING WITH A PARTNER?: NEVER MARRIED
HOW OFTEN DO YOU GET TOGETHER WITH FRIENDS OR RELATIVES?: ONCE A WEEK
IN A TYPICAL WEEK, HOW MANY TIMES DO YOU TALK ON THE PHONE WITH FAMILY, FRIENDS, OR NEIGHBORS?: ONCE A WEEK

## 2021-11-10 ASSESSMENT — LIFESTYLE VARIABLES
HOW MANY STANDARD DRINKS CONTAINING ALCOHOL DO YOU HAVE ON A TYPICAL DAY: 1 OR 2
HOW OFTEN DO YOU HAVE A DRINK CONTAINING ALCOHOL: MONTHLY OR LESS
HOW OFTEN DO YOU HAVE SIX OR MORE DRINKS ON ONE OCCASION: NEVER

## 2021-11-10 ASSESSMENT — MIFFLIN-ST. JEOR: SCORE: 2269.96

## 2021-11-10 NOTE — PROGRESS NOTES
SUBJECTIVE:   CC: Cami Cardenas is an 26 year old woman who presents for preventive health visit.       Healthy Habits:     Getting at least 3 servings of Calcium per day:  Yes    Bi-annual eye exam:  Yes    Dental care twice a year:  Yes    Sleep apnea or symptoms of sleep apnea:  None    Diet:  Regular (no restrictions)    Frequency of exercise:  1 day/week    Duration of exercise:  15-30 minutes    Taking medications regularly:  Yes    Medication side effects:  None    PHQ-2 Total Score: 0    Additional concerns today:  Yes    About 6 times in the past year, she has awakened with right lower is up to her left chest.  She denies any GI symptoms.  No constipation or gas or diarrhea.  No nausea or vomiting.  These episodes always happen at night.  She will have pain that occurs every 5 minutes for around an hour.  The pain lessens if she clenches her abdominal muscles.  Does not notice a positional component.  There is no pattern of worsening of frequency or intensity at this point.    Does get pelvic cramps periodically, they may happen for couple weeks in a row.  Continues OCP.  Uses this for dysmenorrhea.    Denies sexual activity.    Today's PHQ-2 Score:   PHQ-2 ( 1999 Pfizer) 11/3/2021   Q1: Little interest or pleasure in doing things 0   Q2: Feeling down, depressed or hopeless 0   PHQ-2 Score 0   Q1: Little interest or pleasure in doing things Not at all   Q2: Feeling down, depressed or hopeless Not at all   PHQ-2 Score 0       Abuse: Current or Past (Physical, Sexual or Emotional) - No  Do you feel safe in your environment? Yes    Have you ever done Advance Care Planning? (For example, a Health Directive, POLST, or a discussion with a medical provider or your loved ones about your wishes): No, advance care planning information given to patient to review.  Patient plans to discuss their wishes with loved ones or provider.      Social History     Tobacco Use     Smoking status: Never Smoker      Smokeless tobacco: Never Used   Substance Use Topics     Alcohol use: No     If you drink alcohol do you typically have >3 drinks per day or >7 drinks per week? No    Alcohol Use 11/10/2021   Prescreen: >3 drinks/day or >7 drinks/week? -   Prescreen: >3 drinks/day or >7 drinks/week? No   No flowsheet data found.    Reviewed orders with patient.  Reviewed health maintenance and updated orders accordingly - Yes       Breast Cancer Screening:    FHS-7:   Breast CA Risk Assessment (FHS-7) 11/3/2021   Did any of your first-degree relatives have breast or ovarian cancer? No   Did any of your relatives have bilateral breast cancer? Unknown   Did any man in your family have breast cancer? No   Did any woman in your family have breast and ovarian cancer? Yes   Did any woman in your family have breast cancer before age 50 y? No   Do you have 2 or more relatives with breast and/or ovarian cancer? No   Do you have 2 or more relatives with breast and/or bowel cancer? No        Pertinent mammograms are reviewed under the imaging tab.    History of abnormal Pap smear: NO - age 21-29 PAP every 3 years recommended  PAP / HPV 8/13/2018   PAP (Historical) NIL     Reviewed and updated as needed this visit by clinical staff  Tobacco  Allergies  Meds   Med Hx  Surg Hx  Fam Hx  Soc Hx       Reviewed and updated as needed this visit by Provider               Past Medical History:   Diagnosis Date     NO ACTIVE PROBLEMS       Past Surgical History:   Procedure Laterality Date     DENTAL SURGERY      dental implant     OB History   No obstetric history on file.       Review of Systems   Constitutional: Negative for chills and fever.   HENT: Negative for congestion, ear pain, hearing loss and sore throat.    Eyes: Negative for pain and visual disturbance.   Respiratory: Negative for cough and shortness of breath.    Cardiovascular: Negative for chest pain, palpitations and peripheral edema.   Gastrointestinal: Negative for abdominal  "pain, constipation, diarrhea, heartburn, hematochezia and nausea.   Breasts:  Negative for tenderness, breast mass and discharge.   Genitourinary: Negative for dysuria, frequency, genital sores, hematuria, pelvic pain, urgency, vaginal bleeding and vaginal discharge.   Musculoskeletal: Negative for arthralgias, joint swelling and myalgias.   Skin: Negative for rash.   Neurological: Negative for dizziness, weakness, headaches and paresthesias.   Psychiatric/Behavioral: Negative for mood changes. The patient is not nervous/anxious.            OBJECTIVE:   /80 (BP Location: Left arm, Patient Position: Sitting, Cuff Size: Adult Large)   Pulse 102   Temp 97.6  F (36.4  C) (Temporal)   Resp 16   Ht 1.803 m (5' 11\")   Wt 142.9 kg (315 lb)   SpO2 96%   BMI 43.93 kg/m    Physical Exam  GENERAL: healthy, alert and no distress  EYES: Eyes grossly normal to inspection, PERRL and conjunctivae and sclerae normal  HENT: ear canals and TM's normal, nose and mouth without ulcers or lesions  NECK: no adenopathy, no asymmetry, masses, or scars and thyroid normal to palpation  RESP: lungs clear to auscultation - no rales, rhonchi or wheezes  CV: regular rate and rhythm, normal S1 S2, no S3 or S4, no murmur, click or rub, no peripheral edema and peripheral pulses strong  ABDOMEN: soft, nontender, no hepatosplenomegaly, no masses and bowel sounds normal   (female): normal female external genitalia, normal urethral meatus, vaginal mucosa pink, moist, well rugated, and normal cervix   MS: no gross musculoskeletal defects noted, no edema  SKIN: no suspicious lesions or rashes  NEURO: Normal strength and tone, mentation intact and speech normal  PSYCH: mentation appears normal, affect normal/bright    Diagnostic Test Results:  none     ASSESSMENT/PLAN:       ICD-10-CM    1. Routine general medical examination at a health care facility  Z00.00    2. Dysmenorrhea  N94.6    3. Class 3 severe obesity in adult, unspecified BMI, " unspecified obesity type, unspecified whether serious comorbidity present (H)  E66.01    4. Need for hepatitis C screening test  Z11.59       Routine general medical examination at a health care facility   pap completed today  She got her flu shot for the season  - Glucose   - Lipid panel reflex to direct LDL Fasting   Hep C and HIV screening x 1 for low risk adult recommended  Patient has received the single dose J and J COVID-19 vaccine.  I recommended a booster COVID vaccine and she reports that she has the vaccine scheduled.     Allergic conjunctivitis, bilateral   patanol was helpful for her symptoms previously. No refills needed today. Stable.      Hyperhidrosis of axilla  drysol was not really helpful and she had to stop using it because it was irritating to her skin. Has been using witch hazel, which helps some. She will consider derm referral if needed in the future.      Will check TSH today. See pt instructions. She would like to try drysol. Discussed instructions for use. If persistent despite drysol or if intolerant due to skin irritation, will schedule with derm and could discuss other possible treatments such as botox.   - DERMATOLOGY REFERRAL  - aluminum chloride (DRYSOL) 20 % external solution; Apply topically At Bedtime  Dispense: 60 mL; Refill: 3  - TSH with free T4 reflex     Dysmenorrhea  Improved with continuous OCP. Refilled Isabloom today with instructions to take continuously.       Vaginal discomfort   unchanged  Symptoms of vaginismus with tampon insertion. She has never been sexually active. She wonders whether a different choice of OCP would be helpful. I recommended discussing options with gyn.  Referred to gyn.         Morbid Obesity      Referred to 24 week healthy lifestyle program last year, but this was cost prohibitive.  She has been working on making some dietary changes on her own and increasing her exercise.  Has been able to maintain her weight.  Would like to work on  "lifestyle changes on her own for now and will let me know if she needs additional help..     If pelvic cramping or episodes of right lower quadrant radiating pain are occurring more frequently more intensely she will plan on follow-up.     COUNSELING:  Reviewed preventive health counseling, as reflected in patient instructions    Estimated body mass index is 43.93 kg/m  as calculated from the following:    Height as of this encounter: 1.803 m (5' 11\").    Weight as of this encounter: 142.9 kg (315 lb).    Weight management plan: see above    She reports that she has never smoked. She has never used smokeless tobacco.      Counseling Resources:  ATP IV Guidelines  Pooled Cohorts Equation Calculator  Breast Cancer Risk Calculator  BRCA-Related Cancer Risk Assessment: FHS-7 Tool  FRAX Risk Assessment  ICSI Preventive Guidelines  Dietary Guidelines for Americans, 2010  USDA's MyPlate  ASA Prophylaxis  Lung CA Screening    Cristy Pleitez MD  [facility]  "

## 2021-11-12 LAB
BKR LAB AP GYN ADEQUACY: NORMAL
BKR LAB AP GYN INTERPRETATION: NORMAL
BKR LAB AP HPV REFLEX: NORMAL
BKR LAB AP PREVIOUS ABNORMAL: NORMAL
PATH REPORT.COMMENTS IMP SPEC: NORMAL
PATH REPORT.COMMENTS IMP SPEC: NORMAL
PATH REPORT.RELEVANT HX SPEC: NORMAL

## 2021-11-15 ENCOUNTER — IMMUNIZATION (OUTPATIENT)
Dept: NURSING | Facility: CLINIC | Age: 26
End: 2021-11-15
Payer: COMMERCIAL

## 2021-11-15 PROCEDURE — 91300 PR COVID VAC PFIZER DIL RECON 30 MCG/0.3 ML IM: CPT

## 2021-11-15 PROCEDURE — 0004A PR COVID VAC PFIZER DIL RECON 30 MCG/0.3 ML IM: CPT

## 2021-12-17 ENCOUNTER — OFFICE VISIT (OUTPATIENT)
Dept: URGENT CARE | Facility: URGENT CARE | Age: 26
End: 2021-12-17
Payer: COMMERCIAL

## 2021-12-17 VITALS
DIASTOLIC BLOOD PRESSURE: 84 MMHG | SYSTOLIC BLOOD PRESSURE: 136 MMHG | WEIGHT: 293 LBS | HEART RATE: 108 BPM | BODY MASS INDEX: 43.93 KG/M2 | OXYGEN SATURATION: 96 % | TEMPERATURE: 98.2 F

## 2021-12-17 DIAGNOSIS — M54.42 ACUTE BILATERAL LOW BACK PAIN WITH BILATERAL SCIATICA: Primary | ICD-10-CM

## 2021-12-17 DIAGNOSIS — M54.41 ACUTE BILATERAL LOW BACK PAIN WITH BILATERAL SCIATICA: Primary | ICD-10-CM

## 2021-12-17 PROCEDURE — 99213 OFFICE O/P EST LOW 20 MIN: CPT | Performed by: FAMILY MEDICINE

## 2021-12-17 RX ORDER — CYCLOBENZAPRINE HCL 10 MG
10 TABLET ORAL 3 TIMES DAILY PRN
Qty: 30 TABLET | Refills: 0 | Status: SHIPPED | OUTPATIENT
Start: 2021-12-17 | End: 2022-12-20

## 2021-12-17 NOTE — PROGRESS NOTES
Assessment & Plan     Acute bilateral low back pain with bilateral sciatica    - cyclobenzaprine (FLEXERIL) 10 MG tablet; Take 1 tablet (10 mg) by mouth 3 times daily as needed for muscle spasms    Recommend HEAT to area as much as possible along with ALEVE 2 pills in AM and PM to treat inflammation and Flexeril to treat the muscle spasms and worsening radicular pain.  Continue with gentle ROM/stretching exercises.  Xrays deferred with no hx of trauma to back.  Close Follow-up with PCP for PT referral prn if no change or worsening sx prn.    Teresa Crowell MD  [facility] [place]    Roselyn NDIAYE is a 26 year old who presents for the following health issues     HPI     Tweaked back on Monday night when reaching under to get art supplies that had fallen-- laid on stomach to reach and felt sharp pain in low back.  Has been treating with ICE and HEAT and minimal NSAIDs with no relief.  Pain has continued to worsen when at noon today pain was so bad could not move.  Now feels pain radiating from central low back into B lateral thighs.  Denies any LE weakness, bowel or bladder issues.    Dad with hx of sciatica- tries to do stretching exercises daily previous to this injury.    Review of Systems   Constitutional, HEENT, cardiovascular, pulmonary, GI, , musculoskeletal, neuro, skin, endocrine and psych systems are negative, except as otherwise noted.      Objective    /84   Pulse 108   Temp 98.2  F (36.8  C) (Tympanic)   Wt 142.9 kg (315 lb)   SpO2 96%   BMI 43.93 kg/m    Body mass index is 43.93 kg/m .  Physical Exam   GENERAL: healthy, alert and no distress  EYES: Eyes grossly normal to inspection, PERRL and conjunctivae and sclerae normal  MS: no gross musculoskeletal defects noted, no edema  SKIN: no suspicious lesions or rashes  NEURO: Normal strength and tone, mentation intact and speech normal  BACK: tender over lower lumbar spine just RIGHT to lumbar spine  itself  PSYCH: mentation appears normal, affect normal/bright

## 2021-12-22 DIAGNOSIS — H10.13 ALLERGIC CONJUNCTIVITIS, BILATERAL: ICD-10-CM

## 2021-12-23 RX ORDER — OLOPATADINE HYDROCHLORIDE 1 MG/ML
SOLUTION/ DROPS OPHTHALMIC
Qty: 5 ML | Refills: 0 | Status: SHIPPED | OUTPATIENT
Start: 2021-12-23 | End: 2022-01-31

## 2021-12-23 NOTE — TELEPHONE ENCOUNTER
Miscellaneous Opthalmic Allergy Drops Protocol Passed 12/22/2021 02:55 PM   Protocol Details  Patient is age 4 or older    Recent (12 mo) or future (30 days) visit within the authorizing provider's specialty    Medication is active on med list    Patient is not pregnant    No positive pregnancy test on record in past 12 mos

## 2022-01-29 DIAGNOSIS — H10.13 ALLERGIC CONJUNCTIVITIS, BILATERAL: ICD-10-CM

## 2022-01-31 ENCOUNTER — MYC REFILL (OUTPATIENT)
Dept: FAMILY MEDICINE | Facility: CLINIC | Age: 27
End: 2022-01-31
Payer: COMMERCIAL

## 2022-01-31 DIAGNOSIS — H10.13 ALLERGIC CONJUNCTIVITIS, BILATERAL: ICD-10-CM

## 2022-01-31 RX ORDER — OLOPATADINE HYDROCHLORIDE 1 MG/ML
SOLUTION/ DROPS OPHTHALMIC
Qty: 5 ML | Refills: 0 | Status: SHIPPED | OUTPATIENT
Start: 2022-01-31 | End: 2022-03-21

## 2022-01-31 RX ORDER — OLOPATADINE HYDROCHLORIDE 1 MG/ML
SOLUTION/ DROPS OPHTHALMIC
Qty: 5 ML | Refills: 0 | Status: CANCELLED | OUTPATIENT
Start: 2022-01-31

## 2022-01-31 NOTE — TELEPHONE ENCOUNTER
Miscellaneous Opthalmic Allergy Drops Protocol Passed 01/29/2022 02:44 PM   Protocol Details  Patient is age 4 or older    Recent (12 mo) or future (30 days) visit within the authorizing provider's specialty    Medication is active on med list    Patient is not pregnant    No positive pregnancy test on record in past 12 mos

## 2022-11-02 DIAGNOSIS — H10.13 ALLERGIC CONJUNCTIVITIS, BILATERAL: ICD-10-CM

## 2022-11-02 DIAGNOSIS — N94.6 DYSMENORRHEA: ICD-10-CM

## 2022-11-02 RX ORDER — DESOGESTREL AND ETHINYL ESTRADIOL 0.15-0.03
1 KIT ORAL DAILY
Qty: 112 TABLET | Refills: 0 | Status: SHIPPED | OUTPATIENT
Start: 2022-11-02 | End: 2023-01-24

## 2022-11-02 RX ORDER — OLOPATADINE HYDROCHLORIDE 1 MG/ML
SOLUTION/ DROPS OPHTHALMIC
Qty: 5 ML | Refills: 0 | Status: SHIPPED | OUTPATIENT
Start: 2022-11-02 | End: 2023-04-23

## 2022-11-02 NOTE — TELEPHONE ENCOUNTER
Prescription approved per [facility] Refill Protocol.  VALERIE HuffN, RN-[facility]

## 2022-11-02 NOTE — TELEPHONE ENCOUNTER
Reason for Call:  Medication or medication refill:    Do you use a [facility]?  Name of the pharmacy and phone number for the current request:  [facility]    Name of the medication requested: eye drops and birth control    Other request: would like a refill on medication can not get in until 12/20/2022.  Needs them by the end of week. Patient will come in sooner if available appt    Can we leave a detailed message on this number? YES    Phone number patient can be reached at: Home number on file [phone] (home)    Best Time:any    Call taken on 11/2/2022 at 9:40 AM by Dilma Ching

## 2022-11-07 ENCOUNTER — MYC MEDICAL ADVICE (OUTPATIENT)
Dept: FAMILY MEDICINE | Facility: CLINIC | Age: 27
End: 2022-11-07

## 2022-11-19 ENCOUNTER — HEALTH MAINTENANCE LETTER (OUTPATIENT)
Age: 27
End: 2022-11-19

## 2022-12-13 ASSESSMENT — ENCOUNTER SYMPTOMS
EYE PAIN: 0
FEVER: 0
SORE THROAT: 0
CHILLS: 0
ABDOMINAL PAIN: 0
HEMATOCHEZIA: 0
HEARTBURN: 0
NERVOUS/ANXIOUS: 0
PALPITATIONS: 0
ARTHRALGIAS: 0
DIARRHEA: 0
HEADACHES: 0
PARESTHESIAS: 0
COUGH: 0
CONSTIPATION: 0
BREAST MASS: 0
JOINT SWELLING: 0
SHORTNESS OF BREATH: 0
DIZZINESS: 0
MYALGIAS: 0
FREQUENCY: 0
DYSURIA: 0
WEAKNESS: 0
HEMATURIA: 0
NAUSEA: 0

## 2022-12-18 ENCOUNTER — HEALTH MAINTENANCE LETTER (OUTPATIENT)
Age: 27
End: 2022-12-18

## 2022-12-20 ENCOUNTER — OFFICE VISIT (OUTPATIENT)
Dept: FAMILY MEDICINE | Facility: CLINIC | Age: 27
End: 2022-12-20
Payer: COMMERCIAL

## 2022-12-20 VITALS
RESPIRATION RATE: 18 BRPM | HEART RATE: 93 BPM | WEIGHT: 293 LBS | BODY MASS INDEX: 39.68 KG/M2 | DIASTOLIC BLOOD PRESSURE: 83 MMHG | HEIGHT: 72 IN | SYSTOLIC BLOOD PRESSURE: 119 MMHG | OXYGEN SATURATION: 97 % | TEMPERATURE: 98.7 F

## 2022-12-20 DIAGNOSIS — H10.13 ALLERGIC CONJUNCTIVITIS, BILATERAL: ICD-10-CM

## 2022-12-20 DIAGNOSIS — Z13.220 SCREENING FOR HYPERLIPIDEMIA: ICD-10-CM

## 2022-12-20 DIAGNOSIS — Z23 HIGH PRIORITY FOR 2019-NCOV VACCINE: ICD-10-CM

## 2022-12-20 DIAGNOSIS — Z00.00 ROUTINE GENERAL MEDICAL EXAMINATION AT A HEALTH CARE FACILITY: Primary | ICD-10-CM

## 2022-12-20 DIAGNOSIS — Z23 NEED FOR PROPHYLACTIC VACCINATION AND INOCULATION AGAINST INFLUENZA: ICD-10-CM

## 2022-12-20 DIAGNOSIS — Z12.4 SCREENING FOR CERVICAL CANCER: ICD-10-CM

## 2022-12-20 DIAGNOSIS — Z13.1 ENCOUNTER FOR SCREENING FOR DIABETES MELLITUS: ICD-10-CM

## 2022-12-20 LAB
CHOLEST SERPL-MCNC: 209 MG/DL
FASTING STATUS PATIENT QL REPORTED: YES
GLUCOSE SERPL-MCNC: 81 MG/DL (ref 70–99)
HDLC SERPL-MCNC: 61 MG/DL
LDLC SERPL CALC-MCNC: 123 MG/DL
NONHDLC SERPL-MCNC: 148 MG/DL
TRIGL SERPL-MCNC: 124 MG/DL

## 2022-12-20 PROCEDURE — 36415 COLL VENOUS BLD VENIPUNCTURE: CPT | Performed by: NURSE PRACTITIONER

## 2022-12-20 PROCEDURE — 90686 IIV4 VACC NO PRSV 0.5 ML IM: CPT | Performed by: NURSE PRACTITIONER

## 2022-12-20 PROCEDURE — 99395 PREV VISIT EST AGE 18-39: CPT | Mod: 25 | Performed by: NURSE PRACTITIONER

## 2022-12-20 PROCEDURE — 91312 COVID-19 VACCINE BIVALENT BOOSTER 12+ (PFIZER): CPT | Performed by: NURSE PRACTITIONER

## 2022-12-20 PROCEDURE — 82947 ASSAY GLUCOSE BLOOD QUANT: CPT | Performed by: NURSE PRACTITIONER

## 2022-12-20 PROCEDURE — 80061 LIPID PANEL: CPT | Performed by: NURSE PRACTITIONER

## 2022-12-20 PROCEDURE — 90471 IMMUNIZATION ADMIN: CPT | Performed by: NURSE PRACTITIONER

## 2022-12-20 PROCEDURE — 0124A COVID-19 VACCINE BIVALENT BOOSTER 12+ (PFIZER): CPT | Performed by: NURSE PRACTITIONER

## 2022-12-20 RX ORDER — DESOGESTREL AND ETHINYL ESTRADIOL 0.15-0.03
1 KIT ORAL DAILY
Qty: 112 TABLET | Refills: 0 | Status: CANCELLED | OUTPATIENT
Start: 2022-12-20

## 2022-12-20 RX ORDER — OLOPATADINE HYDROCHLORIDE 1 MG/ML
SOLUTION/ DROPS OPHTHALMIC
Qty: 5 ML | Refills: 0 | Status: CANCELLED | OUTPATIENT
Start: 2022-12-20

## 2022-12-20 ASSESSMENT — ENCOUNTER SYMPTOMS
SHORTNESS OF BREATH: 0
CONSTIPATION: 0
WEAKNESS: 0
EYE PAIN: 0
NAUSEA: 0
PARESTHESIAS: 0
PALPITATIONS: 0
ARTHRALGIAS: 0
FEVER: 0
BREAST MASS: 0
HEADACHES: 0
JOINT SWELLING: 0
ABDOMINAL PAIN: 0
NERVOUS/ANXIOUS: 0
SORE THROAT: 0
DIZZINESS: 0
DIARRHEA: 0
CHILLS: 0
COUGH: 0
HEMATURIA: 0
MYALGIAS: 0
FREQUENCY: 0
DYSURIA: 0
HEMATOCHEZIA: 0
HEARTBURN: 0

## 2022-12-20 NOTE — PROGRESS NOTES
SUBJECTIVE:   CC: SENTHIL is an 27 year old who presents for preventive health visit.   Patient has been advised of split billing requirements and indicates understanding: Yes     27 year old year old female  with PMH   Patient Active Problem List   Diagnosis Code     Hematoma of leg S80.10XA     Sprain and strain of hip and thigh [id number]     Leg pain M79.606     Morbid obesity (H) E66.01    in clinic for preventive health care exam.       Healthy Habits:     Getting at least 3 servings of Calcium per day:  Yes    Bi-annual eye exam:  Yes    Dental care twice a year:  Yes    Sleep apnea or symptoms of sleep apnea:  None    Diet:  Regular (no restrictions)    Frequency of exercise:  1 day/week    Duration of exercise:  15-30 minutes    Taking medications regularly:  Yes    Medication side effects:  Not applicable    PHQ-2 Total Score: 0    Additional concerns today:  No      Today's PHQ-2 Score:   PHQ-2 ( 1999 Pf[employer]) 12/13/2022   Q1: Little interest or pleasure in doing things 0   Q2: Feeling down, depressed or hopeless 0   PHQ-2 Score 0   PHQ-2 Total Score (12-17 Years)- Positive if 3 or more points; Administer PHQ-A if positive -   Q1: Little interest or pleasure in doing things Not at all   Q2: Feeling down, depressed or hopeless Not at all   PHQ-2 Score 0       Social History     Tobacco Use     Smoking status: Never     Smokeless tobacco: Never   Substance Use Topics     Alcohol use: No         Alcohol Use 12/13/2022   Prescreen: >3 drinks/day or >7 drinks/week? No   Prescreen: >3 drinks/day or >7 drinks/week? -       Reviewed orders with patient.  Reviewed health maintenance and updated orders accordingly - Yes  Lab work is in process  Labs reviewed in EPIC  BP Readings from Last 3 Encounters:   12/20/22 119/83   12/17/21 136/84   11/10/21 122/80    Wt Readings from Last 3 Encounters:   12/20/22 138.3 kg (305 lb)   12/17/21 142.9 kg (315 lb)   11/10/21 142.9 kg (315 lb)                    Breast Cancer  Screening:    FHS-7:  Maternal grandmother age 70-80s  Breast CA Risk Assessment (S-7) 11/3/2021 12/13/2022   Did any of your first-degree relatives have breast or ovarian cancer? No No   Did any of your relatives have bilateral breast cancer? Unknown Unknown   Did any man in your family have breast cancer? No No   Did any woman in your family have breast and ovarian cancer? Yes No   Did any woman in your family have breast cancer before age 50 y? No No   Do you have 2 or more relatives with breast and/or ovarian cancer? No No   Do you have 2 or more relatives with breast and/or bowel cancer? No No       Patient under 40 years of age: Routine Mammogram Screening not recommended.   Pertinent mammograms are reviewed under the imaging tab.    History of abnormal Pap smear: NO - age 30- 65 PAP every 3 years recommended  PAP / HPV Latest Ref Rng & Units 11/10/2021 8/13/2018   PAP   Negative for Intraepithelial Lesion or Malignancy (NILM) -   PAP (Historical) - - NIL     Reviewed and updated as needed this visit by clinical staff   Tobacco  Allergies  Meds  Problems  Med Hx  Surg Hx  Fam Hx          Reviewed and updated as needed this visit by Provider   Tobacco  Allergies  Meds  Problems  Med Hx  Surg Hx  Fam Hx           Review of Systems   Constitutional: Negative for chills and fever.   HENT: Negative for congestion, ear pain, hearing loss and sore throat.    Eyes: Negative for pain and visual disturbance.   Respiratory: Negative for cough and shortness of breath.    Cardiovascular: Negative for chest pain, palpitations and peripheral edema.   Gastrointestinal: Negative for abdominal pain, constipation, diarrhea, heartburn, hematochezia and nausea.   Breasts:  Negative for tenderness, breast mass and discharge.   Genitourinary: Negative for dysuria, frequency, genital sores, hematuria, pelvic pain, urgency, vaginal bleeding and vaginal discharge.   Musculoskeletal: Negative for arthralgias, joint  "swelling and myalgias.   Skin: Negative for rash.   Neurological: Negative for dizziness, weakness, headaches and paresthesias.   Psychiatric/Behavioral: Negative for mood changes. The patient is not nervous/anxious.        OBJECTIVE:   /83   Pulse 93   Temp 98.7  F (37.1  C) (Oral)   Resp 18   Ht 1.822 m (5' 11.75\")   Wt 138.3 kg (305 lb)   SpO2 97%   BMI 41.65 kg/m    Physical Exam  GENERAL: healthy, alert and no distress  EYES: Eyes grossly normal to inspection, PERRL and conjunctivae and sclerae normal  HENT: ear canals and TM's normal, nose and mouth without ulcers or lesions  NECK: no adenopathy, no asymmetry, masses, or scars and thyroid normal to palpation  RESP: lungs clear to auscultation - no rales, rhonchi or wheezes  BREAST: normal without masses, tenderness or nipple discharge and no palpable axillary masses or adenopathy  CV: regular rate and rhythm, normal S1 S2, no S3 or S4, no murmur, click or rub, no peripheral edema and peripheral pulses strong  ABDOMEN: soft, nontender, no hepatosplenomegaly, no masses and bowel sounds normal  MS: no gross musculoskeletal defects noted, no edema  SKIN: no suspicious lesions or rashes  NEURO: Normal strength and tone, mentation intact and speech normal  PSYCH: mentation appears normal, affect normal/bright    Diagnostic Test Results:  Labs reviewed in Epic    ASSESSMENT/PLAN:   Cami was seen today for physical, imm/inj and imm/inj.    Diagnoses and all orders for this visit:    Routine general medical examination at a health care facility  Preventative exam w/no abnormalities and/or concerns listed in diagnoses; discussed health maintenance screenings including prostate, breast, cervical and colorectal ca screenings related to gender;  reviewed and reconciled medication, medical history and patient related health concerns  Plan: obtain metabolic labs; will call for medication renewals     Need for prophylactic vaccination and inoculation against " influenza  -     INFLUENZA VACCINE IM > 6 MONTHS VALENT IIV4 (AFLURIA/FLUZONE)    High priority for 2019-nCoV vaccine  -     COVID-19,PF,PFIZER BOOSTER BIVALENT 12+Yrs    Screening for hyperlipidemia  Assess ASCVD risk for statin therapy to reduce CAD/CVA ristk  Plan: fasting/non-fasting test  -     Lipid panel reflex to direct LDL Fasting; Future    -     Lipid Profile; Future  -     Lipid Profile    Encounter for screening for diabetes mellitus  -     Glucose; Future  -     Glucose    Screening for cervical cancer  Completed 2021; follow up per guidelines      Other orders  -     Cancel: COVID-19,PF,PFIZER BOOSTER BIVALENT 12+Yrs        Patient has been advised of split billing requirements and indicates understanding: Yes      COUNSELING:  Reviewed preventive health counseling, as reflected in patient instructions        She reports that she has never smoked. She has never used smokeless tobacco.      ALICIA Spence CNP  [facility]

## 2022-12-31 NOTE — RESULT ENCOUNTER NOTE
SENTHIL    -Your cholesterol is borderline high, but your overall heart disease risk score is within normal limits.      Continue exercise and heart healthy Mediterranean-style diet rich in fish, olive oil, whole grains, vegetables and low in animal fats and processed foods to reduce your risk of heart disease.    Follow up in 1 year for your annual physical and cholesterol monitoring.    Sincerely  ALICIA Spence CNP

## 2023-01-10 ENCOUNTER — ANCILLARY PROCEDURE (OUTPATIENT)
Dept: GENERAL RADIOLOGY | Facility: CLINIC | Age: 28
End: 2023-01-10
Attending: NURSE PRACTITIONER
Payer: COMMERCIAL

## 2023-01-10 ENCOUNTER — OFFICE VISIT (OUTPATIENT)
Dept: URGENT CARE | Facility: URGENT CARE | Age: 28
End: 2023-01-10
Payer: COMMERCIAL

## 2023-01-10 VITALS
DIASTOLIC BLOOD PRESSURE: 75 MMHG | TEMPERATURE: 98.4 F | BODY MASS INDEX: 39.68 KG/M2 | OXYGEN SATURATION: 98 % | SYSTOLIC BLOOD PRESSURE: 133 MMHG | HEIGHT: 72 IN | HEART RATE: 92 BPM | WEIGHT: 293 LBS

## 2023-01-10 DIAGNOSIS — M25.511 ACUTE PAIN OF RIGHT SHOULDER: ICD-10-CM

## 2023-01-10 DIAGNOSIS — M62.838 MUSCLE SPASM OF RIGHT SHOULDER: Primary | ICD-10-CM

## 2023-01-10 PROCEDURE — 99214 OFFICE O/P EST MOD 30 MIN: CPT | Performed by: NURSE PRACTITIONER

## 2023-01-10 PROCEDURE — 73030 X-RAY EXAM OF SHOULDER: CPT | Mod: TC | Performed by: RADIOLOGY

## 2023-01-10 RX ORDER — METHOCARBAMOL 750 MG/1
750 TABLET, FILM COATED ORAL 3 TIMES DAILY PRN
Qty: 21 TABLET | Refills: 0 | Status: SHIPPED | OUTPATIENT
Start: 2023-01-10 | End: 2023-01-17

## 2023-01-10 NOTE — LETTER
[facility]  [address] [place] [address]  Phone: [phone]        2023    Cami Guillermoky  [address]  [facility] [address]  [phone] (home)     :     1995      To Whom it May Concern:    This patient was seen in clinic yesterday for acute injury.  Please excuse medical related absences.  She may return to work as tolerated and improved as long as work duties do not elicit severe pain.  She has follow-up with Ortho regarding this injury for further guidance.    Please contact me for questions or concerns.    Sincerely,    Pamela Roy NP

## 2023-01-10 NOTE — PROGRESS NOTES
"Chief Complaint   Patient presents with     Shoulder Pain     Last Thursday pt felt shifting pop in rt shoulder, since then it is painful to carry anything or move it, occasionally twitch, still painful      SUBJECTIVE:  Cami Cardenas is a 27 year old female who presents to the clinic today with a right shoulder injury shoveling 5 days ago, rotator cuff tenderness, limited ROM, tender scapula, felt a pop of the joint, spasms.  No numbness tingling pallor cool sensation pulselessness.  No relevant past medical history.    Past Medical History:   Diagnosis Date     NO ACTIVE PROBLEMS      desogestrel-ethinyl estradiol (ISIBLOOM) 0.15-30 MG-MCG tablet, Take 1 tablet by mouth daily  diphenhydrAMINE (BENADRYL) 25 MG tablet, Take 1-2 tablets (25-50 mg) by mouth every 6 hours as needed for itching or allergies  diphenhydrAMINE HCl (ALLERGY MEDICINE PO), Take by mouth daily OTC allergy medicine  olopatadine (PATANOL) 0.1 % ophthalmic solution, INSTILL 1 DROP IN BOTH EYES TWICE DAILY Strength: 0.1 %    No current facility-administered medications on file prior to visit.    Social History     Tobacco Use     Smoking status: Never     Smokeless tobacco: Never   Substance Use Topics     Alcohol use: No     Allergies   Allergen Reactions     Dust Mites      Minthoxtachys      Allergic to all mints     Pollen Extract      Ragweeds        Review of Systems   All systems negative except for those listed above in HPI.    EXAM:   /75 (BP Location: Left arm, Patient Position: Sitting, Cuff Size: Adult Large)   Pulse 92   Temp 98.4  F (36.9  C) (Temporal)   Ht 1.822 m (5' 11.75\")   Wt 138.3 kg (305 lb)   SpO2 98%   BMI 41.65 kg/m      Physical Exam  Vitals reviewed.   Constitutional:       General: She is not in acute distress.     Appearance: Normal appearance. She is not ill-appearing, toxic-appearing or diaphoretic.   HENT:      Head: Normocephalic and atraumatic.   Cardiovascular:      Rate and Rhythm: Normal rate. "      Pulses: Normal pulses.   Pulmonary:      Effort: Pulmonary effort is normal.   Musculoskeletal:         General: Tenderness and signs of injury present. No swelling or deformity.      Comments: + Rotator cuff signs, limited range of motion   Skin:     General: Skin is warm and dry.      Findings: No bruising, erythema or rash.   Neurological:      General: No focal deficit present.      Mental Status: She is alert and oriented to person, place, and time.      Sensory: No sensory deficit.      Motor: No weakness.   Psychiatric:         Mood and Affect: Mood normal.         Behavior: Behavior normal.       X-ray done in clinic read by me as negative for fracture or dislocation.    ASSESSMENT:    ICD-10-CM    1. Muscle spasm of right shoulder  M62.838 methocarbamol (ROBAXIN) 750 MG tablet     Orthopedic  Referral      2. Acute pain of right shoulder  M25.511 XR Shoulder Right G/E 3 Views     Orthopedic  Referral        PLAN:    Differentials include rotator cuff tendinitis tear or sprain spasms  No bony abnormality visualized on x-ray  Rotate Tylenol ibuprofen  Ice heat massage stretch sling  Ortho for follow-up    Follow up with primary care provider with any problems, questions or concerns or if symptoms worsen or fail to improve. Patient agreed to plan and verbalized understanding.    Pamela Roy, YANP-[facility]

## 2023-01-11 ENCOUNTER — OFFICE VISIT (OUTPATIENT)
Dept: ORTHOPEDICS | Facility: CLINIC | Age: 28
End: 2023-01-11
Attending: NURSE PRACTITIONER
Payer: COMMERCIAL

## 2023-01-11 ENCOUNTER — PRE VISIT (OUTPATIENT)
Dept: ORTHOPEDICS | Facility: CLINIC | Age: 28
End: 2023-01-11

## 2023-01-11 VITALS — HEIGHT: 72 IN | WEIGHT: 293 LBS | BODY MASS INDEX: 39.68 KG/M2

## 2023-01-11 DIAGNOSIS — M25.511 ACUTE PAIN OF RIGHT SHOULDER: ICD-10-CM

## 2023-01-11 DIAGNOSIS — S46.911A SHOULDER STRAIN, RIGHT, INITIAL ENCOUNTER: Primary | ICD-10-CM

## 2023-01-11 PROCEDURE — 99203 OFFICE O/P NEW LOW 30 MIN: CPT | Performed by: FAMILY MEDICINE

## 2023-01-11 NOTE — LETTER
1/11/2023      RE: Cami Cardenas  [address]  [facility] [address]     Dear Colleague,    Thank you for referring your patient, Cami Cardenas, to the [facility] [place]. Please see a copy of my visit note below.    Right shoulder pain    Today, the patient reports that 5 days ago, she was trying to dig a a car out of a snow rdige and went to push the car and felt a shift and pain in the shoulder. The pain is located over the posterior aspect of the shoulder. Pain is worse with horizontal adduction, abduction and shoulder extension, carrying anything heavier than a gallon of milk. Denies any paresthesias. She is right hand dominant. She is taking OTC medication and ice for the pain prn. She has never been to physical therapy for the right shoulder. 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     Images below reviewed by me:  RIGHT SHOULDER THREE OR MORE VIEWS  1/10/2023 10:52 AM     INDICATION: Right shoulder injury shoveling, rotator cuff signs,  tender scapula. Acute pain of right shoulder.     COMPARISON: None available.                                                                       IMPRESSION: Anatomic alignment right shoulder. No acute displaced  right shoulder fracture. No significant joint space narrowing. Flat  acromion.     LORA FARMER MD         PMH:  Past Medical History:   Diagnosis Date     NO ACTIVE PROBLEMS        Active problem list:  Patient Active Problem List   Diagnosis     Hematoma of leg     Sprain and strain of hip and thigh     Leg pain     Morbid obesity (H)       FH:  Family History   Problem Relation Age of Onset     Arthritis Mother      Genetic Disorder Mother         Celiac Disease     High cholesterol Father      Hyperlipidemia Father      Diabetes Paternal Grandmother      Hypertension Paternal Grandmother      Coronary Artery Disease Other         Paternal Great  Grandfather     Hypertension Other         Maternal Uncle     Hyperlipidemia Other         Maternal Aunt     Cerebrovascular Disease Other         Paternal Great Grandmother     Other Cancer Other         Maternal Great Uncle; Kidneys     Mental Illness Other         Alzheimer's in various older Maternal relations     Obesity Other         Maternal Aunts (2)     Hypertension Maternal Grandmother      Hyperlipidemia Maternal Grandmother      Breast Cancer Maternal Grandmother      Colon Cancer Maternal Grandmother      Other Cancer Maternal Grandmother         Kidneys     Hypertension Maternal Grandfather      Hyperlipidemia Maternal Grandfather      Prostate Cancer Maternal Grandfather      Other Cancer Maternal Grandfather         Bone Cancer (C.O.D.)     Prostate Cancer Paternal Grandfather      Depression Paternal Grandfather      Anxiety Disorder Paternal Grandfather      Mental Illness Paternal Grandfather         Parkinson's and Dementia       SH:  Social History     Socioeconomic History     Marital status: Single     Spouse name: Not on file     Number of children: Not on file     Years of education: Not on file     Highest education level: Not on file   Occupational History     Not on file   Tobacco Use     Smoking status: Never     Smokeless tobacco: Never   Vaping Use     Vaping Use: Never used   Substance and Sexual Activity     Alcohol use: No     Drug use: No     Sexual activity: Never   Other Topics Concern     Parent/sibling w/ CABG, MI or angioplasty before 65F 55M? No   Social History Narrative     Not on file     Social Determinants of Health     Financial Resource Strain: Low Risk      Difficulty of Paying Living Expenses: Not hard at all   Food Insecurity: No Food Insecurity     Worried About Running Out of Food in the Last Year: Never true     Ran Out of Food in the Last Year: Never true   Transportation Needs: No Transportation Needs     Lack of Transportation (Medical): No     Lack of  Transportation (Non-Medical): No   Physical Activity: Not on file   Stress: Not on file   Social Connections: Not on file   Intimate Partner Violence: Not on file   Housing Stability: Not on file       MEDS:  See EMR, reviewed  ALL:  See EMR, reviewed    REVIEW OF SYSTEMS:  CONSTITUTIONAL:NEGATIVE for fever, chills, change in weight  INTEGUMENTARY/SKIN: NEGATIVE for worrisome rashes, moles or lesions  EYES: NEGATIVE for vision changes or irritation  ENT/MOUTH: NEGATIVE for ear, mouth and throat problems  RESP:NEGATIVE for significant cough or SOB  BREAST: NEGATIVE for masses, tenderness or discharge  CV: NEGATIVE for chest pain, palpitations or peripheral edema  GI: NEGATIVE for nausea, abdominal pain, heartburn, or change in bowel habits  :NEGATIVE for frequency, dysuria, or hematuria  :NEGATIVE for frequency, dysuria, or hematuria  NEURO: NEGATIVE for weakness, dizziness or paresthesias  ENDOCRINE: NEGATIVE for temperature intolerance, skin/hair changes  HEME/ALLERGY/IMMUNE: NEGATIVE for bleeding problems  PSYCHIATRIC: NEGATIVE for changes in mood or affect      Objective: She has full active and passive range of motion at the shoulder in the upright and supine position.  Overhead impingement signs are mildly positive.  She is nontender over the SCJ joint, clavicle, AC joint.  Nontender over the biceps tendon, mildly tender over the anterior cuff, mildly tender over the posterior joint line.  [place]'s test is negative.  Supine apprehension test is mildly uncomfortable with the relocation test negative.  5 out of 5 strength bilaterally at deltoid, supraspinatus, infraspinatus and subscapularis.  No scapular winging.  Distal sensation is intact.  Signs of multijoint laxity with recurvatum at the bilateral elbows and hyperflexibility at the bilateral wrists.    Assessment: Right-sided rotator cuff shoulder strain with possible shoulder subluxation x5 days.  Multijoint laxity.    Plan: She was given a work  restriction for the next 4 weeks.  She was taught Codman's exercises to avoid a frozen shoulder.  Tylenol as needed for discomfort.  She is not involved in an athletic program currently that involves upper extremity use.  We will avoid physical therapy at this time.  I expect her to make clinical improvements over the next 4 weeks.  She agrees to follow-up if not improved.      Again, thank you for allowing me to participate in the care of your patient.      Sincerely,    Tai Freedman MD

## 2023-01-11 NOTE — TELEPHONE ENCOUNTER
DIAGNOSIS: right shoulder pain   APPOINTMENT DATE: 1.11.23   NOTES STATUS DETAILS   OFFICE NOTE from referring provider Internal 1.10.23 Pamela Roy NP   MEDICATION LIST Internal    XRAYS (IMAGES & REPORTS) Internal 1.10.23 R shoulder

## 2023-01-11 NOTE — LETTER
2023    Cami Ronald  [address]  [facility] [address]  [phone] (home)     :     1995      To Whom it May Concern:    This patient was seen today in clinic for right-sided shoulder discomfort after an acute injury 5 days prior.  For the next 4 weeks at work, she will avoid repetitive overhead reaching or outstretched right arm activities, and avoid repetitive lifting greater than 10 pounds with the right upper extremity.      Sincerely,        Tai Freedman MD

## 2023-01-11 NOTE — PROGRESS NOTES
Right shoulder pain    Today, the patient reports that 5 days ago, she was trying to dig a a car out of a snow rdige and went to push the car and felt a shift and pain in the shoulder. The pain is located over the posterior aspect of the shoulder. Pain is worse with horizontal adduction, abduction and shoulder extension, carrying anything heavier than a gallon of milk. Denies any paresthesias. She is right hand dominant. She is taking OTC medication and ice for the pain prn. She has never been to physical therapy for the right shoulder. 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     Images below reviewed by me:  RIGHT SHOULDER THREE OR MORE VIEWS  1/10/2023 10:52 AM     INDICATION: Right shoulder injury shoveling, rotator cuff signs,  tender scapula. Acute pain of right shoulder.     COMPARISON: None available.                                                                       IMPRESSION: Anatomic alignment right shoulder. No acute displaced  right shoulder fracture. No significant joint space narrowing. Flat  acromion.     LORA FARMER MD         PMH:  Past Medical History:   Diagnosis Date     NO ACTIVE PROBLEMS        Active problem list:  Patient Active Problem List   Diagnosis     Hematoma of leg     Sprain and strain of hip and thigh     Leg pain     Morbid obesity (H)       FH:  Family History   Problem Relation Age of Onset     Arthritis Mother      Genetic Disorder Mother         Celiac Disease     High cholesterol Father      Hyperlipidemia Father      Diabetes Paternal Grandmother      Hypertension Paternal Grandmother      Coronary Artery Disease Other         Paternal Great Grandfather     Hypertension Other         Maternal Uncle     Hyperlipidemia Other         Maternal Aunt     Cerebrovascular Disease Other         Paternal Great Grandmother     Other Cancer Other         Maternal Great Uncle; Kidneys     Mental Illness Other          Alzheimer's in various older Maternal relations     Obesity Other         Maternal Aunts (2)     Hypertension Maternal Grandmother      Hyperlipidemia Maternal Grandmother      Breast Cancer Maternal Grandmother      Colon Cancer Maternal Grandmother      Other Cancer Maternal Grandmother         Kidneys     Hypertension Maternal Grandfather      Hyperlipidemia Maternal Grandfather      Prostate Cancer Maternal Grandfather      Other Cancer Maternal Grandfather         Bone Cancer (C.O.D.)     Prostate Cancer Paternal Grandfather      Depression Paternal Grandfather      Anxiety Disorder Paternal Grandfather      Mental Illness Paternal Grandfather         Parkinson's and Dementia       SH:  Social History     Socioeconomic History     Marital status: Single     Spouse name: Not on file     Number of children: Not on file     Years of education: Not on file     Highest education level: Not on file   Occupational History     Not on file   Tobacco Use     Smoking status: Never     Smokeless tobacco: Never   Vaping Use     Vaping Use: Never used   Substance and Sexual Activity     Alcohol use: No     Drug use: No     Sexual activity: Never   Other Topics Concern     Parent/sibling w/ CABG, MI or angioplasty before 65F 55M? No   Social History Narrative     Not on file     Social Determinants of Health     Financial Resource Strain: Low Risk      Difficulty of Paying Living Expenses: Not hard at all   Food Insecurity: No Food Insecurity     Worried About Running Out of Food in the Last Year: Never true     Ran Out of Food in the Last Year: Never true   Transportation Needs: No Transportation Needs     Lack of Transportation (Medical): No     Lack of Transportation (Non-Medical): No   Physical Activity: Not on file   Stress: Not on file   Social Connections: Not on file   Intimate Partner Violence: Not on file   Housing Stability: Not on file       MEDS:  See EMR, reviewed  ALL:  See EMR, reviewed    REVIEW OF  SYSTEMS:  CONSTITUTIONAL:NEGATIVE for fever, chills, change in weight  INTEGUMENTARY/SKIN: NEGATIVE for worrisome rashes, moles or lesions  EYES: NEGATIVE for vision changes or irritation  ENT/MOUTH: NEGATIVE for ear, mouth and throat problems  RESP:NEGATIVE for significant cough or SOB  BREAST: NEGATIVE for masses, tenderness or discharge  CV: NEGATIVE for chest pain, palpitations or peripheral edema  GI: NEGATIVE for nausea, abdominal pain, heartburn, or change in bowel habits  :NEGATIVE for frequency, dysuria, or hematuria  :NEGATIVE for frequency, dysuria, or hematuria  NEURO: NEGATIVE for weakness, dizziness or paresthesias  ENDOCRINE: NEGATIVE for temperature intolerance, skin/hair changes  HEME/ALLERGY/IMMUNE: NEGATIVE for bleeding problems  PSYCHIATRIC: NEGATIVE for changes in mood or affect      Objective: She has full active and passive range of motion at the shoulder in the upright and supine position.  Overhead impingement signs are mildly positive.  She is nontender over the SCJ joint, clavicle, AC joint.  Nontender over the biceps tendon, mildly tender over the anterior cuff, mildly tender over the posterior joint line.  Fullerton's test is negative.  Supine apprehension test is mildly uncomfortable with the relocation test negative.  5 out of 5 strength bilaterally at deltoid, supraspinatus, infraspinatus and subscapularis.  No scapular winging.  Distal sensation is intact.  Signs of multijoint laxity with recurvatum at the bilateral elbows and hyperflexibility at the bilateral wrists.    Assessment: Right-sided rotator cuff shoulder strain with possible shoulder subluxation x5 days.  Multijoint laxity.    Plan: She was given a work restriction for the next 4 weeks.  She was taught Codman's exercises to avoid a frozen shoulder.  Tylenol as needed for discomfort.  She is not involved in an athletic program currently that involves upper extremity use.  We will avoid physical therapy at this time.  I  expect her to make clinical improvements over the next 4 weeks.  She agrees to follow-up if not improved.

## 2023-01-18 ENCOUNTER — MYC MEDICAL ADVICE (OUTPATIENT)
Dept: FAMILY MEDICINE | Facility: CLINIC | Age: 28
End: 2023-01-18
Payer: COMMERCIAL

## 2023-01-20 ENCOUNTER — E-VISIT (OUTPATIENT)
Dept: FAMILY MEDICINE | Facility: CLINIC | Age: 28
End: 2023-01-20
Payer: COMMERCIAL

## 2023-01-20 DIAGNOSIS — Z53.9 ERRONEOUS ENCOUNTER--DISREGARD: Primary | ICD-10-CM

## 2023-01-24 DIAGNOSIS — N94.6 DYSMENORRHEA: ICD-10-CM

## 2023-01-24 RX ORDER — DESOGESTREL AND ETHINYL ESTRADIOL 0.15-0.03
1 KIT ORAL DAILY
Qty: 112 TABLET | Refills: 1 | Status: SHIPPED | OUTPATIENT
Start: 2023-01-24 | End: 2024-01-02 | Stop reason: ALTCHOICE

## 2023-01-31 ENCOUNTER — VIRTUAL VISIT (OUTPATIENT)
Dept: FAMILY MEDICINE | Facility: CLINIC | Age: 28
End: 2023-01-31
Payer: COMMERCIAL

## 2023-01-31 DIAGNOSIS — E66.813 CLASS 3 SEVERE OBESITY IN ADULT, UNSPECIFIED BMI, UNSPECIFIED OBESITY TYPE, UNSPECIFIED WHETHER SERIOUS COMORBIDITY PRESENT (H): ICD-10-CM

## 2023-01-31 DIAGNOSIS — N94.6 DYSMENORRHEA: ICD-10-CM

## 2023-01-31 DIAGNOSIS — E66.01 CLASS 3 SEVERE OBESITY IN ADULT, UNSPECIFIED BMI, UNSPECIFIED OBESITY TYPE, UNSPECIFIED WHETHER SERIOUS COMORBIDITY PRESENT (H): ICD-10-CM

## 2023-01-31 PROCEDURE — 99213 OFFICE O/P EST LOW 20 MIN: CPT | Mod: 95 | Performed by: FAMILY MEDICINE

## 2023-01-31 RX ORDER — ACETAMINOPHEN AND CODEINE PHOSPHATE 120; 12 MG/5ML; MG/5ML
0.35 SOLUTION ORAL DAILY
Qty: 90 TABLET | Refills: 3 | Status: SHIPPED | OUTPATIENT
Start: 2023-01-31 | End: 2024-01-02

## 2023-01-31 NOTE — PROGRESS NOTES
"SENTHIL is a 27 year old who is being evaluated via a billable video visit.      How would you like to obtain your AVS? [employer]  If the video visit is dropped, the invitation should be resent by: Text to cell phone: [phone]  Will anyone else be joining your video visit? No      .    Assessment & Plan     Dysmenorrhea    Has noticed more vaginal dryness and vaginal pains since starting OCP. Speculum exam has become more uncomfortable. For that reason, she would like to try progestin-only pill. Will start micronor as below. Also discussed for the future other possibilities including vaginal ring (may cause less vaginal dryness than oral formulation combined OCP) or progestin implant vs IUD vs depo-provera  - norethindrone (MICRONOR) 0.35 MG tablet  Dispense: 90 tablet; Refill: 3    BMI>40  known issue that I take into account for pt's medical decisions. No current exacerbations or concerns.             BMI:   Estimated body mass index is 41.65 kg/m  as calculated from the following:    Height as of 1/11/23: 1.822 m (5' 11.75\").    Weight as of 1/11/23: 138.3 kg (305 lb).           Cristy Pleitez MD   [facility]   SENTHIL is a 27 year old  presenting for the following health issues:  Recheck Medication (Pt reports she would like to discuss possibly changing BC.)      History of Present Illness       Reason for visit:  Discuss medication change    She eats 2-3 servings of fruits and vegetables daily.She consumes 2 sweetened beverage(s) daily.She exercises with enough effort to increase her heart rate 30 to 60 minutes per day.  She exercises with enough effort to increase her heart rate 4 days per week.   She is taking medications regularly.     She would like to discuss change in her OCP.     Mychart E-Visit Other 1    Question 1/20/2023 12:47 PM CST - Filed by Patient   Please describe your symptoms. I was wondering if it might be possible to switch what type of birth control " "pill I'm taking. I started looking into progesterone only \"minipills\" and I'm wondering if they might help with some of my other issues. I was chatting with a friend and she mentioned that she had similar issues with pain until she switched, and while she isn't a medical professional, it was intriguing. Since I'm already taking the combo pills continuously, it wouldn't be a change of schedule either.         Is it possible to switch prescriptions, since I am close to needing a refill request anyways, or do I need to have a physical appointment first?         Thanks.   Have you had these symptoms before? Yes   How long have you been having these symptoms? For more than a month   Please list any medications you are currently taking for this condition. Birth Control   Please describe any probable cause for these symptoms.     Wrap up        Review of Systems          Objective           Vitals:  No vitals were obtained today due to virtual visit.    Physical Exam   GENERAL: Healthy, alert and no distress  EYES: Eyes grossly normal to inspection.  No discharge or erythema, or obvious scleral/conjunctival abnormalities.  RESP: No audible wheeze, cough, or visible cyanosis.  No visible retractions or increased work of breathing.    SKIN: Visible skin clear. No significant rash, abnormal pigmentation or lesions.  NEURO: Cranial nerves grossly intact.  Mentation and speech appropriate for age.  PSYCH: Mentation appears normal, affect normal/bright, judgement and insight intact, normal speech and appearance well-groomed.                  Video-Visit Details    Type of service:  Video Visit     Originating Location (pt. Location): Home    Distant Location (provider location):  Off-site  Platform used for Video Visit: Sarah    "

## 2023-02-02 ENCOUNTER — MYC MEDICAL ADVICE (OUTPATIENT)
Dept: ORTHOPEDICS | Facility: CLINIC | Age: 28
End: 2023-02-02
Payer: COMMERCIAL

## 2023-08-31 ENCOUNTER — MYC MEDICAL ADVICE (OUTPATIENT)
Dept: FAMILY MEDICINE | Facility: CLINIC | Age: 28
End: 2023-08-31
Payer: COMMERCIAL

## 2023-08-31 NOTE — LETTER
September 1, 2023      Cami Cardenas  [address]  [facility] [address]        To Whom It May Concern:    Cami Cardenas is a patient of mine and she is allergic to mint of all varieties. Her reaction to the smell or physical exposure to mint includes headaches, nausea, dizziness, vomiting, and rash. She does not require anaphylactic prophylaxis such as Epinephrine, but she may need to step away or excuse herself if she is in contact with mint of any kind.       Sincerely,        Cristy Pleitez MD

## 2023-09-01 NOTE — TELEPHONE ENCOUNTER
Pended letter    If ok, let us know and we will send to her via [employer].    VALERIE CulpN ROMIE  [facility]

## 2023-11-24 ENCOUNTER — IMMUNIZATION (OUTPATIENT)
Dept: FAMILY MEDICINE | Facility: CLINIC | Age: 28
End: 2023-11-24
Payer: COMMERCIAL

## 2023-11-24 PROCEDURE — 90480 ADMN SARSCOV2 VAC 1/ONLY CMP: CPT

## 2023-11-24 PROCEDURE — 91320 SARSCV2 VAC 30MCG TRS-SUC IM: CPT

## 2023-12-31 ASSESSMENT — ENCOUNTER SYMPTOMS
FEVER: 0
CONSTIPATION: 0
DYSURIA: 0
DIZZINESS: 0
CHILLS: 0
ARTHRALGIAS: 0
PARESTHESIAS: 0
EYE PAIN: 0
PALPITATIONS: 0
HEADACHES: 0
HEMATURIA: 0
HEMATOCHEZIA: 0
HEARTBURN: 0
JOINT SWELLING: 0
WEAKNESS: 0
MYALGIAS: 0
DIARRHEA: 0
COUGH: 0
NAUSEA: 0
BREAST MASS: 0
SORE THROAT: 0
NERVOUS/ANXIOUS: 0
SHORTNESS OF BREATH: 0
FREQUENCY: 0
ABDOMINAL PAIN: 0

## 2024-01-02 ENCOUNTER — OFFICE VISIT (OUTPATIENT)
Dept: FAMILY MEDICINE | Facility: CLINIC | Age: 29
End: 2024-01-02
Payer: COMMERCIAL

## 2024-01-02 VITALS
OXYGEN SATURATION: 98 % | TEMPERATURE: 98.2 F | HEIGHT: 71 IN | RESPIRATION RATE: 18 BRPM | BODY MASS INDEX: 40.94 KG/M2 | DIASTOLIC BLOOD PRESSURE: 76 MMHG | HEART RATE: 94 BPM | SYSTOLIC BLOOD PRESSURE: 132 MMHG | WEIGHT: 292.4 LBS

## 2024-01-02 DIAGNOSIS — J30.2 SEASONAL ALLERGIC RHINITIS, UNSPECIFIED TRIGGER: ICD-10-CM

## 2024-01-02 DIAGNOSIS — F41.9 ANXIETY: ICD-10-CM

## 2024-01-02 DIAGNOSIS — E66.813 CLASS 3 SEVERE OBESITY DUE TO EXCESS CALORIES WITHOUT SERIOUS COMORBIDITY IN ADULT, UNSPECIFIED BMI (H): ICD-10-CM

## 2024-01-02 DIAGNOSIS — N94.6 DYSMENORRHEA: ICD-10-CM

## 2024-01-02 DIAGNOSIS — E66.01 CLASS 3 SEVERE OBESITY DUE TO EXCESS CALORIES WITHOUT SERIOUS COMORBIDITY IN ADULT, UNSPECIFIED BMI (H): ICD-10-CM

## 2024-01-02 DIAGNOSIS — H10.13 ALLERGIC CONJUNCTIVITIS, BILATERAL: ICD-10-CM

## 2024-01-02 DIAGNOSIS — Z00.00 ROUTINE GENERAL MEDICAL EXAMINATION AT A HEALTH CARE FACILITY: Primary | ICD-10-CM

## 2024-01-02 LAB
CHOLEST SERPL-MCNC: 172 MG/DL
FASTING STATUS PATIENT QL REPORTED: YES
FASTING STATUS PATIENT QL REPORTED: YES
GLUCOSE SERPL-MCNC: 90 MG/DL (ref 70–99)
HDLC SERPL-MCNC: 49 MG/DL
LDLC SERPL CALC-MCNC: 105 MG/DL
NONHDLC SERPL-MCNC: 123 MG/DL
TRIGL SERPL-MCNC: 88 MG/DL

## 2024-01-02 PROCEDURE — 36415 COLL VENOUS BLD VENIPUNCTURE: CPT | Performed by: FAMILY MEDICINE

## 2024-01-02 PROCEDURE — 82947 ASSAY GLUCOSE BLOOD QUANT: CPT | Performed by: FAMILY MEDICINE

## 2024-01-02 PROCEDURE — 99213 OFFICE O/P EST LOW 20 MIN: CPT | Mod: 25 | Performed by: FAMILY MEDICINE

## 2024-01-02 PROCEDURE — 99395 PREV VISIT EST AGE 18-39: CPT | Performed by: FAMILY MEDICINE

## 2024-01-02 PROCEDURE — 80061 LIPID PANEL: CPT | Performed by: FAMILY MEDICINE

## 2024-01-02 RX ORDER — ACETAMINOPHEN AND CODEINE PHOSPHATE 120; 12 MG/5ML; MG/5ML
0.35 SOLUTION ORAL DAILY
Qty: 90 TABLET | Refills: 3 | Status: SHIPPED | OUTPATIENT
Start: 2024-01-02

## 2024-01-02 ASSESSMENT — ENCOUNTER SYMPTOMS
DYSURIA: 0
ARTHRALGIAS: 0
FREQUENCY: 0
JOINT SWELLING: 0
PARESTHESIAS: 0
HEMATOCHEZIA: 0
WEAKNESS: 0
DIZZINESS: 0
HEMATURIA: 0
SORE THROAT: 0
COUGH: 0
PALPITATIONS: 0
HEADACHES: 0
CONSTIPATION: 0
NAUSEA: 0
HEARTBURN: 0
DIARRHEA: 0
SHORTNESS OF BREATH: 0
MYALGIAS: 0
NERVOUS/ANXIOUS: 0
CHILLS: 0
ABDOMINAL PAIN: 0
FEVER: 0
BREAST MASS: 0
EYE PAIN: 0

## 2024-01-02 ASSESSMENT — PAIN SCALES - GENERAL: PAINLEVEL: NO PAIN (0)

## 2024-01-02 NOTE — PATIENT INSTRUCTIONS
Schedule with Carlos Burns   Schedule with therapist (someone will call you to schedule).   Schedule with an allergist to discuss allergy shots.   Let me know if you want a referral to the weight management.        Preventive Health Recommendations  Female Ages 26 - 39  Yearly exam:   See your health care provider every year in order to  Review health changes.   Discuss preventive care.    Review your medicines if you your doctor has prescribed any.    Until age 30: Get a Pap test every three years (more often if you have had an abnormal result).    After age 30: Talk to your doctor about whether you should have a Pap test every 3 years or have a Pap test with HPV screening every 5 years.   You do not need a Pap test if your uterus was removed (hysterectomy) and you have not had cancer.  You should be tested each year for STDs (sexually transmitted diseases), if you're at risk.   Talk to your provider about how often to have your cholesterol checked.  If you are at risk for diabetes, you should have a diabetes test (fasting glucose).  Shots: Get a flu shot each year. Get a tetanus shot every 10 years.   Nutrition:   Eat at least 5 servings of fruits and vegetables each day.  Eat whole-grain bread, whole-wheat pasta and brown rice instead of white grains and rice.  Get adequate Calcium and Vitamin D.     Lifestyle  Exercise at least 150 minutes a week (30 minutes a day, 5 days of the week). This will help you control your weight and prevent disease.  Limit alcohol to one drink per day.  No smoking.   Wear sunscreen to prevent skin cancer.  See your dentist every six months for an exam and cleaning.

## 2024-01-02 NOTE — PROGRESS NOTES
SUBJECTIVE:   SENTHIL is a 28 year old, presenting for the following:  Physical        1/2/2024     9:01 AM   Additional Questions   Roomed by Alix   Accompanied by Self         1/2/2024     9:01 AM   Patient Reported Additional Medications   Patient reports taking the following new medications None       Healthy Habits:     Getting at least 3 servings of Calcium per day:  Yes    Bi-annual eye exam:  Yes    Dental care twice a year:  Yes    Sleep apnea or symptoms of sleep apnea:  None    Diet:  Regular (no restrictions)    Frequency of exercise:  1 day/week    Duration of exercise:  15-30 minutes    Taking medications regularly:  Yes    Medication side effects:  Not applicable    Additional concerns today:  Yes    Around memorial day her allergies got really bad to the point that she was on every OTC medicine at once to function. It hurt to have her eyes open. Itched to have them closed. Wondering what could be option for treatment.     Also wondering about mental health services. Has had a lot of anxiety this past year. No particular trigger. Started late December of last year. Has a surge of anxiety when she wakes up or middle of day while sitting at her desk and does not know why.       Social History     Tobacco Use    Smoking status: Never     Passive exposure: Never    Smokeless tobacco: Never   Substance Use Topics    Alcohol use: No             12/31/2023    12:01 PM   Alcohol Use   Prescreen: >3 drinks/day or >7 drinks/week? No     Reviewed orders with patient.  Reviewed health maintenance and updated orders accordingly - Yes       Breast Cancer Screening:    FHS-7:       11/3/2021    12:37 PM 12/13/2022     9:20 AM 12/31/2023    12:02 PM   Breast CA Risk Assessment (FHS-7)   Did any of your first-degree relatives have breast or ovarian cancer? No No No   Did any of your relatives have bilateral breast cancer? Unknown Unknown Unknown   Did any man in your family have breast cancer? No No No   Did any  woman in your family have breast and ovarian cancer? Yes No No   Did any woman in your family have breast cancer before age 50 y? No No Unknown   Do you have 2 or more relatives with breast and/or ovarian cancer? No No No   Do you have 2 or more relatives with breast and/or bowel cancer? No No Unknown        History of abnormal Pap smear: NO - age 30-65 PAP every 5 years with negative HPV co-testing recommended      11/10/2021     9:03 AM 8/13/2018     3:05 PM   PAP / HPV   PAP Negative for Intraepithelial Lesion or Malignancy (NILM)     PAP (Historical)  NIL      Reviewed and updated as needed this visit by clinical staff   Tobacco  Allergies  Meds              Reviewed and updated as needed this visit by Provider                 Past Medical History:   Diagnosis Date    NO ACTIVE PROBLEMS       Past Surgical History:   Procedure Laterality Date    DENTAL SURGERY      dental implant       Review of Systems   Constitutional:  Negative for chills and fever.   HENT:  Negative for congestion, ear pain, hearing loss and sore throat.    Eyes:  Negative for pain and visual disturbance.   Respiratory:  Negative for cough and shortness of breath.    Cardiovascular:  Negative for chest pain, palpitations and peripheral edema.   Gastrointestinal:  Negative for abdominal pain, constipation, diarrhea, heartburn, hematochezia and nausea.   Breasts:  Negative for tenderness, breast mass and discharge.   Genitourinary:  Negative for dysuria, frequency, genital sores, hematuria, pelvic pain, urgency, vaginal bleeding and vaginal discharge.   Musculoskeletal:  Negative for arthralgias, joint swelling and myalgias.   Skin:  Negative for rash.   Neurological:  Negative for dizziness, weakness, headaches and paresthesias.   Psychiatric/Behavioral:  Negative for mood changes. The patient is not nervous/anxious.            OBJECTIVE:   /76 (BP Location: Right arm, Patient Position: Sitting, Cuff Size: Adult Large)   Pulse 94    "Temp 98.2  F (36.8  C) (Temporal)   Resp 18   Ht 1.812 m (5' 11.34\")   Wt 132.6 kg (292 lb 6.4 oz)   SpO2 98%   Breastfeeding No   BMI 40.39 kg/m    BP Readings from Last 6 Encounters:   01/02/24 132/76   01/10/23 133/75   12/20/22 119/83   12/17/21 136/84   11/10/21 122/80   10/20/20 136/82        Wt Readings from Last 5 Encounters:   01/02/24 132.6 kg (292 lb 6.4 oz)   01/11/23 138.3 kg (305 lb)   01/10/23 138.3 kg (305 lb)   12/20/22 138.3 kg (305 lb)   12/17/21 142.9 kg (315 lb)      Physical Exam  GENERAL: healthy, alert and no distress  EYES: Eyes grossly normal to inspection, PERRL and conjunctivae and sclerae normal  HENT: ear canals and TM's normal, nose and mouth without ulcers or lesions  NECK: no adenopathy, no asymmetry, masses, or scars and thyroid normal to palpation  RESP: lungs clear to auscultation - no rales, rhonchi or wheezes  CV: regular rate and rhythm, normal S1 S2, no S3 or S4, no murmur, click or rub, no peripheral edema and peripheral pulses strong  ABDOMEN: soft, nontender, no hepatosplenomegaly, no masses and bowel sounds normal  MS: no gross musculoskeletal defects noted, no edema  SKIN: no suspicious lesions or rashes  NEURO: Normal strength and tone, mentation intact and speech normal  PSYCH: mentation appears normal, affect normal/bright    Diagnostic Test Results:  Labs reviewed in Epic    ASSESSMENT/PLAN:       ICD-10-CM    1. Routine general medical examination at a health care facility  Z00.00 Glucose     Lipid panel reflex to direct LDL Fasting      2. Dysmenorrhea  N94.6 norethindrone (MICRONOR) 0.35 MG tablet      3. Anxiety  F41.9 Adult Mental Health  Referral      4. Seasonal allergic rhinitis, unspecified trigger  J30.2 Adult Allergy/Asthma  Referral        Routine general medical examination at a health care facility   doing well   pap completed 2021 with repeat due in 2026  She got her flu shot and updated COVID vaccine for the season  - Glucose " "  - Lipid panel reflex to direct LDL Fasting      Allergic conjunctivitis, bilateral  Seasonal allergies    patanol has been helpful for her intermittent symptoms.  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 Has been using witch hazel, which helps some. She will consider derm referral if needed in the future.      Dysmenorrhea  Much improved on micronor     Vaginal discomfort   Improved. Reports she is able to self-pleasure without pain.   Symptoms of vaginismus with tampon insertion. She has never been sexually active. She wonders whether a different choice of OCP would be helpful. I recommended discussing options with gyn.  Referred to gyn.      Class 3 Obesity  Offered referral to weight management clinic, but she declined for now. She is working on dietary changes on her own.          Anxiety x 1 year, no specific trigger  Referred for therapy and will schedule with Carlos Burns in the meantime.       COUNSELING:  Reviewed preventive health counseling, as reflected in patient instructions      BMI:   Estimated body mass index is 40.39 kg/m  as calculated from the following:    Height as of this encounter: 1.812 m (5' 11.34\").    Weight as of this encounter: 132.6 kg (292 lb 6.4 oz).   Weight management plan: see above       She reports that she has never smoked. She has never been exposed to tobacco smoke. She has never used smokeless tobacco.          Cristy Pleitez MD   [facility]  "

## 2024-01-04 NOTE — RESULT ENCOUNTER NOTE
The results of your recent lipid (cholesterol) profile were abnormal.    Here are the results:  Lab Results       Component                Value               Date                       CHOL                     172                 01/02/2024                 CHOL                     192                 09/04/2019            Lab Results       Component                Value               Date                       HDL                      49                  01/02/2024                 HDL                      61                  09/04/2019            Lab Results       Component                Value               Date                       LDL                      105                 01/02/2024                 LDL                      105                 09/04/2019            Lab Results       Component                Value               Date                       TRIG                     88                  01/02/2024                 TRIG                     130                 09/04/2019            Lab Results       Component                Value               Date                       CHOLHDLRATIO             2.5                 08/14/2014              Desired or goal levels are:  CHOLESTEROL: Desirable is less than 200.   HDL (Good Cholesterol): Desirable is greater than 40 (for men) greater than 50 (for women).  LDL (Bad Cholesterol): Desirable is less than 130 (or less than 100 if you have heart disease or diabetes). Borderline 130-160.  TRIGLYCERIDES: Desirable is less than 150.  Borderline is 150-200.    Your HDL (the good cholesterol) level is low, no medication is required at this point. Reducing your total fat intake, increasing exercise level, reducing weight, adding olive oil to your diet, etc, may help to increase this level..    As you may know, an elevated cholesterol is one factor that increases your risk for heart disease and stroke. You can improve your cholesterol by controlling the amount and type  of fat you eat and by increasing your daily activity level.    Here are some ways to improve your nutrition:  Eat less fat (especially butter, Crisco and other saturated fats)  Buy lean cuts of meat, reduce your portions of red meat or substitute poultry or fish  Use skim milk and low-fat dairy products  Eat no more than 4 egg yolks per week  Avoid fried or fast foods that are high in fat  Eat more fruits and vegetables      Also consider starting or increasing your aerobic activity. Aerobic activity is the best way to improve HDL (good) cholesterol. If this would be new to you, please talk with me first about what activities are safe for you.      Other lab results:    Your glucose (blood sugar) was normal.     Please feel free to contact us with any questions or if you would like more information.      Cristy Pleitez M.D.

## 2024-01-10 ENCOUNTER — VIRTUAL VISIT (OUTPATIENT)
Dept: PSYCHOLOGY | Facility: CLINIC | Age: 29
End: 2024-01-10
Attending: FAMILY MEDICINE
Payer: COMMERCIAL

## 2024-01-10 DIAGNOSIS — F41.9 ANXIETY: ICD-10-CM

## 2024-01-10 PROCEDURE — 90837 PSYTX W PT 60 MINUTES: CPT | Mod: FQ | Performed by: SOCIAL WORKER

## 2024-01-10 ASSESSMENT — COLUMBIA-SUICIDE SEVERITY RATING SCALE - C-SSRS
TOTAL  NUMBER OF ABORTED OR SELF INTERRUPTED ATTEMPTS LIFETIME: NO
6. HAVE YOU EVER DONE ANYTHING, STARTED TO DO ANYTHING, OR PREPARED TO DO ANYTHING TO END YOUR LIFE?: NO
2. HAVE YOU ACTUALLY HAD ANY THOUGHTS OF KILLING YOURSELF?: NO
ATTEMPT LIFETIME: NO
1. HAVE YOU WISHED YOU WERE DEAD OR WISHED YOU COULD GO TO SLEEP AND NOT WAKE UP?: NO
TOTAL  NUMBER OF INTERRUPTED ATTEMPTS LIFETIME: NO

## 2024-01-10 ASSESSMENT — PATIENT HEALTH QUESTIONNAIRE - PHQ9
10. IF YOU CHECKED OFF ANY PROBLEMS, HOW DIFFICULT HAVE THESE PROBLEMS MADE IT FOR YOU TO DO YOUR WORK, TAKE CARE OF THINGS AT HOME, OR GET ALONG WITH OTHER PEOPLE: NOT DIFFICULT AT ALL
SUM OF ALL RESPONSES TO PHQ QUESTIONS 1-9: 0
SUM OF ALL RESPONSES TO PHQ QUESTIONS 1-9: 0

## 2024-01-10 NOTE — PROGRESS NOTES
"    [facility]   Mental Health & Addiction Services     Progress Note - Initial Visit    Patient  Name:  Cami Cardenas Date: January 10, 2024       Service Type: Individual     Visit Start Time: 2:12 PM  Visit End Time: 3:11 PM    Visit #: 1    Attendees: Client attended alone    Service Modality:  Phone Visit:      Provider verified identity through the following two step process.  Patient provided:  Patient  and Patient address    Telephone Visit: The patient's condition can be safely assessed and treated via synchronous audio telemedicine encounter.      Reason for Audio Telemedicine Visit: Patient convenience (e.g. access to timely appointments / distance to available provider)    Originating Site (Patient Location): Patient's home    Distant Site (Provider Location): Provider Remote Setting- Home Office    Consent:  The patient/guardian has verbally consented to:     1. The potential risks and benefits of telemedicine (telephone visit) versus in person care;    The patient has been notified of the following:      \"We have found that certain health care needs can be provided without the need for a face to face visit.  This service lets us provide the care you need with a phone conversation.       I will have full access to your [facility] medical record during this entire phone call.   I will be taking notes for your medical record.      Since this is like an office visit, we will bill your insurance company for this service.       There are potential benefits and risks of telephone visits (e.g. limits to patient confidentiality) that differ from in-person visits.?Confidentiality still applies for telephone services, and nobody will record the visit.  It is important to be in a quiet, private space that is free of distractions (including cell phone or other devices) during the visit.??      If during the course of the call I believe a telephone visit is not appropriate, you will " "not be charged for this service\"     Consent has been obtained for this service by care team member: Yes        DATA:   Interactive Complexity: No   Crisis: No     Presenting Concerns/  Current Stressors:   Patient states: \"In December of 2022 I started feeling really anxious all of the time. The patient identified the following triggers for her anxiety: developing a crush on a co-worker which made her feel anxious at work, social anxiety/feeling anxious around her friends, and poor self-image. The patient states: \"I have never felt comfortable about the way I look.\" The patient reports that she \"never really felt like I fit in.\" The patient reports that she believes that she started to feel uncomfortable with her self-image in high school. The patient identified the following stressors and concerns: work-related stress and interpersonal stress.       ASSESSMENT:  Mental Status Assessment:  Appearance:   N/A - phone session   Eye Contact:   N/A - phone session   Psychomotor Behavior: N/A - phone session   Attitude:   Cooperative  Interested Friendly Pleasant Attentive  Orientation:   All  Speech   Rate / Production: Normal/ Responsive   Volume:  Normal   Mood:    Anxious  Normal  Affect:    N/A - phone session   Thought Content:  Clear   Thought Form:  Coherent  Logical   Insight:    Good  and Intellectual Insight    Assessments completed prior to this visit:  The following assessments were completed by patient for this visit:  PHQ9:       1/10/2024    10:35 AM   PHQ-9 SCORE   PHQ-9 Total Score MyChart 0   PHQ-9 Total Score 0     GAD2:       1/10/2024    10:49 AM   FLORENTIN-2   Feeling nervous, anxious, or on edge 1   Not being able to stop or control worrying 1   FLORENTIN-2 Total Score 2    2       PROMIS 10-Global Health (only subscores and total score):       1/10/2024    10:49 AM   PROMIS-10 Scores Only   Global Mental Health Score 11    11   Global Physical Health Score 16    16   PROMIS TOTAL - SUBSCORES 27    27 "         Safety Issues and Plan for Safety and Risk Management:   [place] Suicide Severity Rating Scale (Lifetime/Recent)      1/10/2024     3:43 PM   [place] Suicide Severity Rating (Lifetime/Recent)   Q1 Wish to be Dead (Lifetime) N   Q2 Non-Specific Active Suicidal Thoughts (Lifetime) N   Actual Attempt (Lifetime) N   Has subject engaged in non-suicidal self-injurious behavior? (Lifetime) N   Interrupted Attempts (Lifetime) N   Aborted or Self-Interrupted Attempt (Lifetime) N   Preparatory Acts or Behavior (Lifetime) N   Calculated C-SSRS Risk Score (Lifetime/Recent) No Risk Indicated     Patient denies current fears or concerns for personal safety.  Patient denies current or recent suicidal ideation or behaviors.  Patient denies current or recent homicidal ideation or behaviors.  Patient denies current or recent self injurious behavior or ideation.  Patient denies other safety concerns.  Recommended that patient call 911 or go to the local ED should there be a change in any of these risk factors.  Patient reports there are no firearms in the house.     Diagnostic Criteria:  Social Anxiety Disorder, Marked fear or anxiety about one or more social situations in which the individual is exposed to possible scrutiny by others. Examples include social interactions (e.g., having a conversation, meeting unfamiliar people), being observed (e.g., eating or drinking), and performing in front of others (e.g., giving a speech)., The individual fears that he or she will act in a way or show anxiety symptoms that will be negatively evaluated, The social situations almost always provoke fear or anxiety., The social situations are avoided or endured with intense fear or anxiety., The fear or anxiety is out of proportion to the actual threat posed by the social situation and to the sociocultural context., The fear, anxiety, or avoidance is persistent, typically lasting for 6 months or mo, The fear, anxiety, or avoidance causes  clinically significant distress or impairment in social, occupational, or other important areas of functioning., The fear, anxiety, or avoidance is not attributable to the physiological effects of a substance (e.g., a drug of abuse, a medication) or another medical condition., The fear, anxiety, or avoidance is not better explained by the symptoms of another mental disorder, and If another medical condition is present, the fear, anxiety, or avoidance is clearly unrelated or is excessive      DSM5 Diagnoses: (Sustained by DSM5 Criteria Listed Above)  Diagnoses: 300.23 (F40.10) Social Anxiety Disorder  Psychosocial & Contextual Factors: Patient reports work and interpersonal stress.   Intervention:   Psychodynamic- Patient processed internal experiences  and Completed through review of safety issues and safety interventions  Collateral Reports Completed:  Routed note to PCP      PLAN: (Homework, other):  1. Provider will continue Diagnostic Assessment.  Patient was given the following to do until next session:  Return for follow up: 2/05/2024    2. Provider recommended the following referrals: individual psychotherapy.      3.  Suicide Risk and Safety Concerns were assessed for Cami Cardenas.    Patient meets the following risk assessment and triage: Patient denied any current/recent/lifetime history of suicidal ideation and/or behaviors.  No safety plan indicated at this time.       Ro Harp, [facility]  January 10, 2024        Answers submitted by the patient for this visit:  Patient Health Questionnaire (Submitted on 1/10/2024)  If you checked off any problems, how difficult have these problems made it for you to do your work, take care of things at home, or get along with other people?: Not difficult at all  PHQ9 TOTAL SCORE: 0

## 2024-01-22 ENCOUNTER — VIRTUAL VISIT (OUTPATIENT)
Dept: PSYCHOLOGY | Facility: CLINIC | Age: 29
End: 2024-01-22
Payer: COMMERCIAL

## 2024-01-22 DIAGNOSIS — F40.10 SOCIAL ANXIETY DISORDER: Primary | ICD-10-CM

## 2024-01-22 PROCEDURE — 90791 PSYCH DIAGNOSTIC EVALUATION: CPT | Mod: 95 | Performed by: SOCIAL WORKER

## 2024-01-22 ASSESSMENT — ANXIETY QUESTIONNAIRES
GAD7 TOTAL SCORE: 11
7. FEELING AFRAID AS IF SOMETHING AWFUL MIGHT HAPPEN: NOT AT ALL
1. FEELING NERVOUS, ANXIOUS, OR ON EDGE: MORE THAN HALF THE DAYS
5. BEING SO RESTLESS THAT IT IS HARD TO SIT STILL: NEARLY EVERY DAY
2. NOT BEING ABLE TO STOP OR CONTROL WORRYING: MORE THAN HALF THE DAYS
4. TROUBLE RELAXING: SEVERAL DAYS
6. BECOMING EASILY ANNOYED OR IRRITABLE: SEVERAL DAYS
IF YOU CHECKED OFF ANY PROBLEMS ON THIS QUESTIONNAIRE, HOW DIFFICULT HAVE THESE PROBLEMS MADE IT FOR YOU TO DO YOUR WORK, TAKE CARE OF THINGS AT HOME, OR GET ALONG WITH OTHER PEOPLE: SOMEWHAT DIFFICULT
3. WORRYING TOO MUCH ABOUT DIFFERENT THINGS: MORE THAN HALF THE DAYS

## 2024-01-22 NOTE — PROGRESS NOTES
"      [facility]      PATIENT'S NAME: Cami Cardenas  PREFERRED NAME: CAMI  PRONOUNS:   She/Her    MRN: [id number]  : 1995  ADDRESS: [address] [place] [id number]  ACCT. NUMBER:  [id number]  DATE OF SERVICE: 24  START TIME: 9:34 AM  END TIME: 10:11 AM  PREFERRED PHONE: [phone]  May we leave a program related message: Yes  EMERGENCY CONTACT: was not obtained.  SERVICE MODALITY:  Video Visit:      Provider verified identity through the following two step process.  Patient provided:  Patient photo and Patient     Telemedicine Visit: The patient's condition can be safely assessed and treated via synchronous audio and visual telemedicine encounter.      Reason for Telemedicine Visit: Patient convenience (e.g. access to timely appointments / distance to available provider)    Originating Site (Patient Location): Patient's home    Distant Site (Provider Location): Provider Remote Setting- Home Office    Consent:  The patient/guardian has verbally consented to: the potential risks and benefits of telemedicine (video visit) versus in person care; bill my insurance or make self-payment for services provided; and responsibility for payment of non-covered services.     Patient would like the video invitation sent by:  My Chart    Mode of Communication:  Video Conference via [facility]    Distant Location (Provider):  Off-site    As the provider I attest to compliance with applicable laws and regulations related to telemedicine.    UNIVERSAL ADULT Mental Health DIAGNOSTIC ASSESSMENT    Identifying Information:  Patient is a 28 year old,  , Cis-gender, female, individual.  Patient was referred for an assessment by primary care provider.  Patient attended the session alone.    Chief Complaint:   The reason for seeking services at this time is: \"Anxiety and stress; self image/self worth; coping and handling both\".  The problem(s) began 23. Patient has not attempted " "to resolve these concerns in the past.    Social/Family History:  Patient reported they grew up in [facility]  .  They were raised by biological parents  .  Parents were always together.  Patient reported that their childhood was \"fairly normal\".  The patient reports that her parents did not use physical punishments (time outs were used). 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 I have always been closer to my dad than my mom, it seems like he was always less judgemental.\" The patient reports that her relationships with her parents has improved since she and her sister bought a house together.  The patient reports that her parents still try to keep tabs on her and expect her see her on a weekly basis.     The patient describes their cultural background as . The patient reports that both of her parents were raised Jain. The patient reports they only went to Je[employer] when they would visit her grandparents.  The patient identifies as Agnostic. The patient states: \"I don't believe in higher power myself but I don't want to deny any one else their beliefs.\" The patient reports that she dabbles in witchcraft. The patient reports that she believes in energy and cleansing.The patient reports that she believes everything has it own energy. Cultural influences and impact on patient's life structure, values, norms, and healthcare: N/a. The patient reports that she mainly utilizes Western Medicine but acknowledges that she does use essential oils and prefers to use natural remedies prior to more chemical medications. Contextual influences on patient's health include: Contextual Factors: Individual Factors the patient states:\"I have been trying to exercise more\". The patient reports that she doesn't feel comfortable going to a gym. The patient reports that she has problems " "sleeping without melatonin and reports that she takes 3-5 mgs of melatonin each night to help herself fall asleep and stay asleep, patient reports that she is currently is averaging 6-7 hours of sleep each night, Family Factors : the patient reports that her family is mostly supportive of her, and Economic Factors : some anxiety related to finances.    These factors will be addressed in the Preliminary Treatment plan. Patient identified their preferred language to be English. Patient reported they does not need the assistance of an  or other support involved in therapy.     Patient reported had no significant delays in developmental tasks.   Patient's highest education level was college graduate  .  Patient identified the following learning problems: none reported.  The patient reports that she is more of a visual learner. Modifications will not be used to assist communication in therapy.  Patient reports they are  able to understand written materials.    Patient reported the following relationship history patient reports that she dated two people in high school and states: \"Since then I have been on one unsuccessful date.\".  Patient's current relationship status is single since high school.   Patient identified their sexual orientation as bi-sexual.  Patient reported having   child(tavo). Patient identified friends as part of their support system.  Patient identified the quality of these relationships as good .      Patient's current living/housing situation involves staying in own home/apartment.  The immediate members of family and household include their older sister and they report that housing is stable.    Patient is currently employed fulltime.  Patient reports their finances are obtained through employment. Patient does identify finances as a current stressor.      Patient reported that they have not been involved with the legal system.   Patient does not report being under probation/ parole/ " jurisdiction. They are not under any current court jurisdiction. .    Patient's Strengths and Limitations:  Patient identified the following strengths or resources that will help them succeed in treatment: friends / good social support, insight, intelligence, and work ethic. Things that may interfere with the patient's success in treatment include: none identified.     Assessments:  The following assessments were completed by patient for this visit:  PHQ9:       1/10/2024    10:35 AM   PHQ-9 SCORE   PHQ-9 Total Score MyChart 0   PHQ-9 Total Score 0     GAD7:        No data to display              CAGE-AID:       1/10/2024    10:49 AM 1/10/2024    10:50 AM   CAGE-AID Total Score   Total Score 0    0    Total Score MyChart  0 (A total score of 2 or greater is considered clinically significant)     PROMIS 10-Global Health (only subscores and total score):       1/10/2024    10:49 AM 1/17/2024    11:53 AM   PROMIS-10 Scores Only   Global Mental Health Score 11    11 11   Global Physical Health Score 16    16 17   PROMIS TOTAL - SUBSCORES 27    27 28     Woodruff Suicide Severity Rating Scale (Lifetime/Recent)      1/10/2024     3:43 PM   Woodruff Suicide Severity Rating (Lifetime/Recent)   Q1 Wish to be Dead (Lifetime) N   Q2 Non-Specific Active Suicidal Thoughts (Lifetime) N   Actual Attempt (Lifetime) N   Has subject engaged in non-suicidal self-injurious behavior? (Lifetime) N   Interrupted Attempts (Lifetime) N   Aborted or Self-Interrupted Attempt (Lifetime) N   Preparatory Acts or Behavior (Lifetime) N   Calculated C-SSRS Risk Score (Lifetime/Recent) No Risk Indicated       Personal and Family Medical History:  Patient does not report a family history of mental health concerns.  Patient reports family history includes Anxiety Disorder in her paternal grandfather; Arthritis in her mother; Breast Cancer in her maternal grandmother; Cerebrovascular Disease in an other family member; Colon Cancer in her maternal  grandmother; Coronary Artery Disease in an other family member; Depression in her paternal grandfather; Diabetes in her paternal grandmother; Genetic Disorder in her mother; High cholesterol in her father; Hyperlipidemia in her father, maternal grandfather, maternal grandmother, and another family member; Hypertension in her maternal grandfather, maternal grandmother, paternal grandmother, and another family member; Mental Illness in her paternal grandfather and another family member; Obesity in an other family member; Other Cancer in her maternal grandfather, maternal grandmother, and another family member; Prostate Cancer in her maternal grandfather and paternal grandfather..     Patient does not report Mental Health Diagnosis and/or Treatment.   Patient reported that her anxiety symptoms began in elementary school.  The patient reports that she used to have panic attacks during fire alarms. The patient reports that her anxiety was more general when she was in elementary school and social anxiety in middle school.  Patient has not received mental health services in the past.  Psychiatric Hospitalizations: None.    Patient denies a history of civil commitment.      Currently, patient  is not receiving other mental health services.  These include none.       Patient has had a physical exam to rule out medical causes for current symptoms.  Date of last physical exam was within the past year. Client was encouraged to follow up with PCP if symptoms were to develop. The patient has a [facility] Provider, who is named Cristy Pleitez.  Patient reports no current medical concerns.  Patient denies any issues with pain..   There are not significant appetite / nutritional concerns / weight changes. The patient reports that she is making an effort to lose weight and eat healthy after gaining weight during th pandemic   Patient reports one minor cuncussion.     Patient reports current meds as:   diphenhydrAMINE  (BENADRYL) 25 MG tablet  norethindrone (MICRONOR) 0.35 MG tablet   olopatadine (PATANOL) 0.1 % ophthalmic solution   diphenhydrAMINE HCl (ALLERGY MEDICINE PO)       Medication Adherence:  Patient reports taking.  taking prescribed medications as prescribed.    Patient Allergies:    Allergies   Allergen Reactions    Dust Mites     Minthoxtachys      Allergic to all mints    Pollen Extract     Ragweeds        Medical History:    Past Medical History:   Diagnosis Date    NO ACTIVE PROBLEMS          Current Mental Status Exam:   Appearance:  Appropriate    Eye Contact:  Good   Psychomotor:  Normal       Gait / station:  Unable to assess via video  Attitude / Demeanor: Cooperative  Interested Friendly Pleasant Attentive  Speech      Rate / Production: Normal/ Responsive      Volume:  Normal  volume      Language:  good  Mood:   Anxious  Normal  Affect:   Appropriate    Thought Content: Clear   Thought Process: Coherent  Logical       Associations: No loosening of associations  Insight:   Good   Judgment:  Intact   Orientation:  All  Attention/concentration: Good    Substance Use:   Patient did not report a family history of substance use concerns; see medical history section for details.  Patient has not received chemical dependency treatment in the past.  Patient has not ever been to detox.      Patient is not currently receiving any chemical dependency treatment.           Substance History of use Age of first use Date of last use     Pattern and duration of use (include amounts and frequency)   Alcohol currently use   22 01/08/24 REPORTS SUBSTANCE USE: reports using substance 1 times per day and has 1 drink at a time.   Patient reports heaviest use is current use. The patient reports that she has never even been tipsy.   Cannabis   never used     REPORTS SUBSTANCE USE: N/A     Amphetamines   never used     REPORTS SUBSTANCE USE: N/A   Cocaine/crack    never used       REPORTS SUBSTANCE USE: N/A   Hallucinogens never  used         REPORTS SUBSTANCE USE: N/A   Inhalants never used         REPORTS SUBSTANCE USE: N/A   Heroin never used         REPORTS SUBSTANCE USE: N/A   Other Opiates never used     REPORTS SUBSTANCE USE: N/A   Benzodiazepine   never used     REPORTS SUBSTANCE USE: N/A   Barbiturates never used     REPORTS SUBSTANCE USE: N/A   Over the counter meds currently use 14 01/10/24 REPORTS SUBSTANCE USE: N/A - patient has not abused over the counter meds   Caffeine currently use 13   REPORTS SUBSTANCE USE: N/A   Nicotine  never used     REPORTS SUBSTANCE USE: N/A   Other substances not listed above:  Identify:  never used     REPORTS SUBSTANCE USE: N/A     Patient reported the following problems as a result of their substance use: no problems, not applicable.    Substance Use: No symptoms    Based on the negative CAGE score and clinical interview there  are not indications of drug or alcohol abuse.    Significant Losses / Trauma / Abuse / Neglect Issues:   Patient did not  serve in the .  There are indications or report of significant loss, trauma, abuse or neglect issues related to: are indications or report of significant loss, trauma, abuse or neglect issues related to: Social trauma- bullying in middle school and high school  Concerns for possible neglect are not present.     Safety Assessment:   Patient denies current homicidal ideation and behaviors.  Patient denies current self-injurious ideation and behaviors.    Patient denied risk behaviors associated with substance use.   Patient denies any high risk behaviors associated with mental health symptoms.  Patient reports the following current concerns for their personal safety: None.  Patient reports there are not firearms in the house.       There are no firearms in the home..    History of Safety Concerns:  Patient denied a history of homicidal ideation.     Patient denied a history of personal safety concerns.    Patient denied a history of assaultive  behaviors.    Patient denied a history of sexual assault behaviors.     Patient denied a history of risk behaviors associated with substance use.  Patient denies any history of high risk behaviors associated with mental health symptoms.  Patient reports the following protective factors: forward or future oriented thinking; dedication to family or friends; abstinence from substances; living with other people; daily obligations    Risk Plan:  See Recommendations for Safety and Risk Management Plan    Review of Symptoms per patient report:   Depression: No symptoms  Mare:  No Symptoms  Psychosis: No Symptoms  Anxiety: Excessive worry, Nervousness, Physical complaints, such as headaches, stomachaches, muscle tension, Social anxiety, and Ruminations  Panic:  No symptoms  Post Traumatic Stress Disorder:  No Symptoms   Eating Disorder: No Symptoms  ADD / ADHD:  No symptoms  Conduct Disorder: No symptoms  Autism Spectrum Disorder: No symptoms  Obsessive Compulsive Disorder: No Symptoms    Patient reports the following compulsive behaviors and treatment history:  None .      Diagnostic Criteria:   Social Anxiety Disorder, Marked fear or anxiety about one or more social situations in which the individual is exposed to possible scrutiny by others. Examples include social interactions (e.g., having a conversation, meeting unfamiliar people), being observed (e.g., eating or drinking), and performing in front of others (e.g., giving a speech)., The individual fears that he or she will act in a way or show anxiety symptoms that will be negatively evaluated, The social situations almost always provoke fear or anxiety., The social situations are avoided or endured with intense fear or anxiety., The fear or anxiety is out of proportion to the actual threat posed by the social situation and to the sociocultural context., The fear, anxiety, or avoidance is persistent, typically lasting for 6 months or mo, The fear, anxiety, or  "avoidance causes clinically significant distress or impairment in social, occupational, or other important areas of functioning., The fear, anxiety, or avoidance is not attributable to the physiological effects of a substance (e.g., a drug of abuse, a medication) or another medical condition., The fear, anxiety, or avoidance is not better explained by the symptoms of another mental disorder, and If another medical condition is present, the fear, anxiety, or avoidance is clearly unrelated or is excessive    Functional Status:  Patient reports the following functional impairments:  relationship(s), social interactions, and work / vocational responsibilities.     Nonprogrammatic care:  Patient is requesting basic services to address current mental health concerns.    Clinical Summary:  1. Psychosocial, Cultural and Contextual Factors: Patient is a 28 years old, , Cis-gender, female. The patient is currently single.   The patient describes their cultural background as . The patient reports that both of her parents were raised Buddhist. The patient reports they only went to [employer] when they would visit her grandparents.  The patient identifies as Agnostic. The patient states: \"I don't believe in higher power myself but I don't want to deny any one else their beliefs.\" The patient reports that she dabbles in witchcraft. The patient reports that she believes in energy and cleansing.The patient reports that she believes everything has it own energy. Cultural influences and impact on patient's life structure, values, norms, and healthcare: N/a. The patient reports that she mainly utilizes Western Medicine but acknowledges that she does use essential oils and prefers to use natural remedies prior to more chemical medications. Contextual influences on patient's health include: Contextual Factors: Individual Factors the patient states:\"I have been trying to exercise more\". The patient reports that she doesn't " feel comfortable going to a gym. The patient reports that she has problems sleeping without melatonin and reports that she takes 3-5 mgs of melatonin each night to help herself fall asleep and stay asleep, patient reports that she is currently is averaging 6-7 hours of sleep each night, Family Factors : the patient reports that her family is mostly supportive of her, and Economic Factors : some anxiety related to finances.    These factors will be addressed in the Preliminary Treatment plan. Patient identified their preferred language to be English. Patient reported they does not need the assistance of an  or other support involved in therapy.     .  2. Principal DSM5 Diagnoses  (Sustained by DSM5 Criteria Listed Above):   300.23 (F40.10) Social Anxiety Disorder.    3. Other Diagnoses that is relevant to services:   None.    4. Provisional Diagnosis:  300.23 (F40.10) Social Anxiety Disorder as evidenced by as evidenced by: patient history, frequency and duration of reported symptoms, patient responses on assessment tools , patient responses on the Adult Intake Questionnaire, and Clinical Interview.    5. Prognosis: Expect Improvement and Relieve Acute Symptoms.    6. Likely consequences of symptoms if not treated: Patient's symptoms could worsen which could further impair the patient's overall functioning. Thus, requiring ahigher level of care or more intensive intervention.  .  7. Client strengths include:  educated, employed, insightful, intelligent, support of family, friends and providers, and work history .     Recommendations:     1. Plan for Safety and Risk Management:   Safety and Risk: Recommended that patient call 911 or go to the local ED should there be a change in any of these risk factors..          Report to child / adult protection services was NA.     2. Patient's identified mental health concerns with a cultural influence will be addressed by providing the patient with client centered  care .     3. Initial Treatment will focus on: Establishing therapeutic relationship   Anxiety - reducing severity of anxiety symptoms .     4. Resources/Service Plan:    services are not indicated.   Modifications to assist communication are not indicated.   Additional disability accommodations are not indicated.      5. Collaboration:   Collaboration / coordination of treatment will be initiated with the following  support professionals: primary care physician if needed.      6.  Referrals:   The following referral(s) will be initiated:  No referrals at this time .       A Release of Information has been obtained for the following:  N/A .     Clinical Substantiation/medical necessity for the above recommendations:  as evidenced by: patient history, frequency and duration of reported symptoms, patient responses on assessment tools , patient responses on the Adult Intake Questionnaire, and Clinical Interview. Without treatment atient's symptoms could worsen which could further impair the patient's overall functioning. Thus, requiring ahigher level of care or more intensive intervention.    7. SYDNI:    SYDNI:  Discussed the general effects of drugs and alcohol on health and well-being. Recommendations:  do not increase substance use .     8. Records:   These were not available for review at time of assessment.   Information in this assessment was obtained from the medical record and  provided by patient who is a good historian.    Patient will have open access to their mental health medical record.    9.   Interactive Complexity: No    10. Safety Plan:  Patient denied any current/recent/lifetime history of suicidal ideation and/or behaviors.  No safety plan indicated at this time.     Provider Name/ Credentials:  Ro Harp, [facility]    January 22, 2024

## 2024-02-05 ENCOUNTER — VIRTUAL VISIT (OUTPATIENT)
Dept: PSYCHOLOGY | Facility: CLINIC | Age: 29
End: 2024-02-05
Payer: COMMERCIAL

## 2024-02-05 DIAGNOSIS — F40.10 SOCIAL ANXIETY DISORDER: Primary | ICD-10-CM

## 2024-02-05 PROCEDURE — 90837 PSYTX W PT 60 MINUTES: CPT | Mod: 95 | Performed by: SOCIAL WORKER

## 2024-02-05 NOTE — PROGRESS NOTES
[facility]                                     Progress Note    Patient Name: Cami Cardenas  Date: 2024           Service Type: Individual      Session Start Time: 9:33 AM  Session End Time: 10:39 AM     Session Length: 66 min (Session was 53+ minutes in length due to: content of session, treatment plan development, and short-term goal setting)    Session #: 3    Attendees: Client attended alone    Service Modality:  Video Visit:      Provider verified identity through the following two step process.  Patient provided:  Patient  and Patient is known previously to provider    Telemedicine Visit: The patient's condition can be safely assessed and treated via synchronous audio and visual telemedicine encounter.      Reason for Telemedicine Visit: Patient convenience (e.g. access to timely appointments / distance to available provider)    Originating Site (Patient Location): Patient's home    Distant Site (Provider Location): Provider Remote Setting- Home Office    Consent:  The patient/guardian has verbally consented to: the potential risks and benefits of telemedicine (video visit) versus in person care; bill my insurance or make self-payment for services provided; and responsibility for payment of non-covered services.     Patient would like the video invitation sent by:  My Chart    Mode of Communication:  Video Conference via [employer]    Distant Location (Provider):  On-site    As the provider I attest to compliance with applicable laws and regulations related to telemedicine.    DATA  Interactive Complexity: No  Crisis: No        Progress Since Last Session (Related to Symptoms / Goals / Homework):   Symptoms: No change in symptoms.  The patient reports that she has low-grade anxiety all day(tensness/knot in stomach that works it's way up, tightness in her chest and throat restricts breathing). The patient reports that she has not had any recent panic attacks.     Homework:   N/A - not established last session      Episode of Care Goals:  Established in session.      Current / Ongoing Stressors and Concerns:   The patient identified the following stressors and concerns: work related stress, patient does not feel supported by her supervisor, her sister who she lives with his sick, the patient reports that she does not really like to talk to people unless she knows them. The patient processed through her internal experiences related to: a recent meeting at work, her recent social interactions, her interpersonal relationships, and family dynamics. The patient and therapist discussed possible triggers for anxiety and her current coping skills. The patient reports that she feels relaxed watching a movie on the couch with her sister or when she is with her friends.     Discussed breathing techniques: 5 finger breathing, box breathing,      Treatment Objective(s) Addressed in This Session:   identify triggers/ fears / thoughts that contribute to feeling anxious  Patient will use at least 1 coping skills for anxiety management per week.  Patient will get 7 or more hours of sleep per night.       Intervention:   Motivational Interviewing: co-developed short-term goals  Psychodynamic: processed through internal experiences  Therapist taught the patient 2 different breathing techniques: 5 Finger Breathing and Box Breathing   Psycho-education on the benefits of: breathing techniques, guided meditation, and pro-actively regulating her nervous system.    Assessments completed prior to visit: None  The following assessments were completed by patient for this visit: None      ASSESSMENT: Current Emotional / Mental Status (status of significant symptoms):   Risk status (Self / Other harm or suicidal ideation)   Patient denies current fears or concerns for personal safety.   Patient denies current or recent suicidal ideation or behaviors.   Patient denies current or recent homicidal ideation or  behaviors.   Patient denies current or recent self injurious behavior or ideation.   Patient denies other safety concerns.   Patient reports there has been no change in risk factors since their last session.     Patient reports there has been no change in protective factors since their last session.     Recommended that patient call 911 or go to the local ED should there be a change in any of these risk factors.     Appearance:   Appropriate    Eye Contact:   Good    Psychomotor Behavior: Normal    Attitude:   Cooperative  Interested Attentive   Orientation:   All   Speech    Rate / Production: Normal/ Responsive    Volume:  Normal    Mood:    Anxious  Normal   Affect:    Appropriate    Thought Content:  Clear    Thought Form:  Coherent  Logical    Insight:    Good      Medication Review:   No current psychiatric medications prescribed     Medication Compliance:   No     Changes in Health Issues:   None reported     Chemical Use Review:   Substance Use: Chemical use reviewed, no active concerns identified      Tobacco Use: No current tobacco use.      Diagnosis:  1. Social anxiety disorder        Collateral Reports Completed:   Not Applicable    PLAN: (Patient Tasks / Therapist Tasks / Other)  Patient will consider downloading Insight Timer.  Patient will practice 5 finger breathing and Box breathing techniques.   Patient will return for follow up:   In a future session: introduce IFS (part that hold stomach tension and part that holds fear of attack)      Ro Harp, [facility]                                                         ______________________________________________________________________    Individual Treatment Plan    Patient's Name: Cami Cardenas  YOB: 1995    Date of Creation: February 5, 2024    Date Treatment Plan Last Reviewed/Revised: February 5, 2024      DSM5 Diagnoses: 300.23 (F40.10) Social Anxiety Disorder  Psychosocial / Contextual Factors:  Patient reports work  related stress.    PROMIS (reviewed every 90 days): 17    Referral / Collaboration:  Referral to another professional/service is not indicated at this time..    Anticipated number of session for this episode of care: 9-12 sessions  Anticipation frequency of session: Every other week  Anticipated Duration of each session: 38-52 minutes  Treatment plan will be reviewed in 90 days or when goals have been changed.       MeasurableTreatment Goal(s) related to diagnosis / functional impairment(s)  Goal 1: Reduce severity of anxiety symptoms from Moderate to Minimal using the FLORENTIN-7 for measurement.     I will know I've met my goal when I won't knowingly feel anxious on a daily basis.      Objective #A (Patient Action)    Patient will identify triggers/ fears / thoughts that contribute to feeling anxious.  Status: New - Date: 2/05/2024      Intervention(s)  Psychodynamic   CBT   Internal Family Systems (IFS)    Objective #B  Patient will use at least 1 coping skills for anxiety management per week.  Status: New - Date: 2/05/2024      Intervention(s)  Therapist will teach emotional regulation skills. Such as: mindfulness, breathing techniques, meditation, visualization  .  ACT (ACT)   Cognitive Behavioral Therapy (CBT)   Internal Family Systems (IFS)    Objective #C  Patient will get 7 or more hours of sleep per night.  Status: New - Date: 2/05/2024      Intervention(s)  Psycho-education  Motivational Interviewing      Patient has reviewed and agreed to the above plan.      Ro Harp, [facility]  February 5, 2024

## 2024-02-19 ENCOUNTER — VIRTUAL VISIT (OUTPATIENT)
Dept: PSYCHOLOGY | Facility: CLINIC | Age: 29
End: 2024-02-19
Payer: COMMERCIAL

## 2024-02-19 DIAGNOSIS — F40.10 SOCIAL ANXIETY DISORDER: Primary | ICD-10-CM

## 2024-02-19 PROCEDURE — 90837 PSYTX W PT 60 MINUTES: CPT | Mod: 95 | Performed by: SOCIAL WORKER

## 2024-02-19 NOTE — PROGRESS NOTES
"[facility]                                     Progress Note    Patient Name: Cami Cardenas  Date: 2024           Service Type: Individual      Session Start Time: 9:33 AM  Session End Time: 10:39 AM     Session Length: 66 min (Session was 53+ minutes in length due to: content of session, emotional state of patient, progress review and short-term goal setting)    Session #: 4    Attendees: Client attended alone    Service Modality:  Video Visit:      Provider verified identity through the following two step process.  Patient provided:  Patient  and Patient is known previously to provider    Telemedicine Visit: The patient's condition can be safely assessed and treated via synchronous audio and visual telemedicine encounter.      Reason for Telemedicine Visit: Patient convenience (e.g. access to timely appointments / distance to available provider)    Originating Site (Patient Location): Patient's home    Distant Site (Provider Location): Provider Remote Setting- Home Office    Consent:  The patient/guardian has verbally consented to: the potential risks and benefits of telemedicine (video visit) versus in person care; bill my insurance or make self-payment for services provided; and responsibility for payment of non-covered services.     Patient would like the video invitation sent by:  My Chart    Mode of Communication:  Video Conference via [employer]    Distant Location (Provider):  On-site    As the provider I attest to compliance with applicable laws and regulations related to telemedicine.    DATA  Interactive Complexity: No  Crisis: No        Progress Since Last Session (Related to Symptoms / Goals / Homework):   Symptoms: No change in symptoms.  The patient reports that she has been experiencing \"low-grade anxiety all day\" (tensness/knot in stomach that works it's way up, tightness in her chest and throat restricts breathing). The patient reports that she has not had " "any recent panic attacks.     Homework: Partially completed      Episode of Care Goals: Satisfactory progress - ACTION (Actively working towards change); Intervened by reinforcing change plan / affirming steps taken     Current / Ongoing Stressors and Concerns:   The patient identified the following stressors and concerns: work related stress - \"work has been more busy than usual\" and social anxiety/feeling left out/feeling like people are talking or laughing about her. The patient reports  that she has been \"doing ok\". The patient reports that \"the past 2 Saturdays have been a little rough\" because of her social anxiety symptoms. The patient processed through her internal experiences related to: work, her recent interpersonal interactions, and her interpersonal relationships. Therapist and patient discussed cognitive strategies the patient used to combat identified ANTS. Therapist then utilized IFS to facilitate Patient Self to Patient Part connection.      Treatment Objective(s) Addressed in This Session:   Identify triggers/ fears / thoughts that contribute to feeling anxious  Patient will use at least 1 coping skills for anxiety management per week     Intervention:   Motivational Interviewing: co-developed short-term goals  Psychodynamic: processed through internal experiences   Psycho-education on the benefits of pro-actively regulating her nervous system   Internal Family Systems was introduced- Therapist then utilized IFS to facilitate Patient Self to Patient Part connection.    Assessments completed prior to visit: None  The following assessments were completed by patient for this visit: None      ASSESSMENT: Current Emotional / Mental Status (status of significant symptoms):   Risk status (Self / Other harm or suicidal ideation)   Patient denies current fears or concerns for personal safety.   Patient denies current or recent suicidal ideation or behaviors.   Patient denies current or recent homicidal " ideation or behaviors.   Patient denies current or recent self injurious behavior or ideation.   Patient denies other safety concerns.   Patient reports there has been no change in risk factors since their last session.     Patient reports there has been no change in protective factors since their last session.     Recommended that patient call 911 or go to the local ED should there be a change in any of these risk factors.     Appearance:   Appropriate    Eye Contact:   Good    Psychomotor Behavior: Normal    Attitude:   Cooperative  Interested Friendly Pleasant Attentive   Orientation:   All   Speech    Rate / Production: Normal/ Responsive Emotional    Volume:  Normal    Mood:    Anxious  Normal Sad    Affect:    Appropriate    Thought Content:  Clear    Thought Form:  Coherent  Logical    Insight:    Good      Medication Review:   No current psychiatric medications prescribed     Medication Compliance:   No     Changes in Health Issues:   None reported     Chemical Use Review:   Substance Use: Chemical use reviewed, no active concerns identified      Tobacco Use: No current tobacco use.      Diagnosis:  1. Social anxiety disorder        Collateral Reports Completed:   Not Applicable    PLAN: (Patient Tasks / Therapist Tasks / Other)  Patient will consider downloading and using Insight Timer.  Patient will continue to practice 5 finger breathing and Box breathing techniques.   Patient will return for follow up:   In a future session: introduce IFS (part that hold stomach tension and part that holds fear of attack)      Ro Harp, [facility]                                                         ______________________________________________________________________    Individual Treatment Plan    Patient's Name: Cami Cardenas  YOB: 1995    Date of Creation: February 5, 2024    Date Treatment Plan Last Reviewed/Revised: February 5, 2024      DSM5 Diagnoses: 300.23 (F40.10) Social Anxiety  Disorder  Psychosocial / Contextual Factors:  Patient reports work related stress.    PROMIS (reviewed every 90 days): 17    Referral / Collaboration:  Referral to another professional/service is not indicated at this time..    Anticipated number of session for this episode of care: 9-12 sessions  Anticipation frequency of session: Every other week  Anticipated Duration of each session: 38-52 minutes  Treatment plan will be reviewed in 90 days or when goals have been changed.       MeasurableTreatment Goal(s) related to diagnosis / functional impairment(s)  Goal 1: Reduce severity of anxiety symptoms from Moderate to Minimal using the FLORENTIN-7 for measurement.     I will know I've met my goal when I won't knowingly feel anxious on a daily basis.      Objective #A (Patient Action)    Patient will identify triggers/ fears / thoughts that contribute to feeling anxious.  Status: New - Date: 2/05/2024      Intervention(s)  Psychodynamic   CBT   Internal Family Systems (IFS)    Objective #B  Patient will use at least 1 coping skills for anxiety management per week.  Status: New - Date: 2/05/2024      Intervention(s)  Therapist will teach emotional regulation skills. Such as: mindfulness, breathing techniques, meditation, visualization  .  ACT (ACT)   Cognitive Behavioral Therapy (CBT)   Internal Family Systems (IFS)    Objective #C  Patient will get 7 or more hours of sleep per night.  Status: New - Date: 2/05/2024      Intervention(s)  Psycho-education  Motivational Interviewing      Patient has reviewed and agreed to the above plan.      Ro Harp, [facility]  February 5, 2024

## 2024-03-04 ENCOUNTER — VIRTUAL VISIT (OUTPATIENT)
Dept: PSYCHOLOGY | Facility: CLINIC | Age: 29
End: 2024-03-04
Payer: COMMERCIAL

## 2024-03-04 DIAGNOSIS — F40.10 SOCIAL ANXIETY DISORDER: Primary | ICD-10-CM

## 2024-03-04 PROCEDURE — 90837 PSYTX W PT 60 MINUTES: CPT | Mod: 95 | Performed by: SOCIAL WORKER

## 2024-03-04 NOTE — PROGRESS NOTES
[facility]                                     Progress Note    Patient Name: Cami Cardenas  Date: 2024       Service Type: Individual      Session Start Time: 9:37 AM  Session End Time: 10:32 AM     Session Length: 55 min (Session was 53+ minutes in length due to: content of session, emotional state of patient, progress review and short-term goal setting)    Session #: 5    Attendees: Client attended alone    Service Modality:  Video Visit:      Provider verified identity through the following two step process.  Patient provided:  Patient  and Patient is known previously to provider    Telemedicine Visit: The patient's condition can be safely assessed and treated via synchronous audio and visual telemedicine encounter.      Reason for Telemedicine Visit: Patient convenience (e.g. access to timely appointments / distance to available provider)    Originating Site (Patient Location): Patient's home    Distant Site (Provider Location): Provider Remote Setting- Home Office    Consent:  The patient/guardian has verbally consented to: the potential risks and benefits of telemedicine (video visit) versus in person care; bill my insurance or make self-payment for services provided; and responsibility for payment of non-covered services.     Patient would like the video invitation sent by:  My Chart    Mode of Communication:  Video Conference via [employer]    Distant Location (Provider):  On-site    As the provider I attest to compliance with applicable laws and regulations related to telemedicine.    DATA  Interactive Complexity: No  Crisis: No        Progress Since Last Session (Related to Symptoms / Goals / Homework):   Symptoms: Improving symptoms. Patient reports that she has been better able to challenge ANTs and has been able to re-assure the activated part as needed.  The patient reports some irritability.  The patient reports that the bat in her basement triggered a panic  attack. The patient reports that she has had low-grade waves of anxiety that come and go.   Homework: Partially completed      Episode of Care Goals: Satisfactory progress - ACTION (Actively working towards change); Intervened by reinforcing change plan / affirming steps taken     Current / Ongoing Stressors and Concerns:   The patient identified the following stressors and concerns: patient found a bat in her basement over the weekend, work has been busy, and complex family dynamics.  The patient processed through her internal experiences related to: the bat in her basement, her new cats, her recent work experiences, her experience with HR, and the dynamics in her family.        Treatment Objective(s) Addressed in This Session:   Continued to address:  Identify triggers/ fears / thoughts that contribute to feeling anxious  Patient will use at least 1 coping skills for anxiety management per week     Intervention:   Motivational Interviewing: co-developed short-term goals and reviewed progress in session  Psychodynamic: processed through internal experiences   Psycho-education    Internal Family Systems   Validation and Normalization    Assessments completed prior to visit: None  The following assessments were completed by patient for this visit: None      ASSESSMENT: Current Emotional / Mental Status (status of significant symptoms):   Risk status (Self / Other harm or suicidal ideation)   Patient denies current fears or concerns for personal safety.   Patient denies current or recent suicidal ideation or behaviors.   Patient denies current or recent homicidal ideation or behaviors.   Patient denies current or recent self injurious behavior or ideation.   Patient denies other safety concerns.   Patient reports there has been no change in risk factors since their last session.     Patient reports there has been no change in protective factors since their last session.     Recommended that patient call 911 or go to  the local ED should there be a change in any of these risk factors.     Appearance:   Appropriate    Eye Contact:   Good    Psychomotor Behavior: Normal    Attitude:   Cooperative  Interested Friendly Pleasant Attentive   Orientation:   All   Speech    Rate / Production: Normal/ Responsive    Volume:  Normal    Mood:    Anxious  Normal   Affect:    Appropriate    Thought Content:  Clear    Thought Form:  Coherent  Logical    Insight:    Good      Medication Review:   No current psychiatric medications prescribed     Medication Compliance:   No     Changes in Health Issues:   None reported     Chemical Use Review:   Substance Use: Chemical use reviewed, no active concerns identified      Tobacco Use: No current tobacco use.      Diagnosis:  1. Social anxiety disorder      Collateral Reports Completed:   Not Applicable    PLAN: (Patient Tasks / Therapist Tasks / Other)  Patient will continue to use Insight Timer.  Patient will practice 5,4,3,2,1.   Patient will continue to practice 5 finger breathing at home when she notices a wave of anxiety and Box breathing techniques.   Patient will return for follow up: 3/28/24  In a future session: introduce IFS (part that hold stomach tension and part that holds fear of attack)      Ro Harp, [facility]                                                         ______________________________________________________________________    Individual Treatment Plan    Patient's Name: Cami Cardenas  YOB: 1995    Date of Creation: February 5, 2024    Date Treatment Plan Last Reviewed/Revised: February 5, 2024      DSM5 Diagnoses: 300.23 (F40.10) Social Anxiety Disorder  Psychosocial / Contextual Factors:  Patient reports work related stress.    PROMIS (reviewed every 90 days): 17    Referral / Collaboration:  Referral to another professional/service is not indicated at this time..    Anticipated number of session for this episode of care: 9-12  sessions  Anticipation frequency of session: Every other week  Anticipated Duration of each session: 38-52 minutes  Treatment plan will be reviewed in 90 days or when goals have been changed.       MeasurableTreatment Goal(s) related to diagnosis / functional impairment(s)  Goal 1: Reduce severity of anxiety symptoms from Moderate to Minimal using the FLORENTIN-7 for measurement.     I will know I've met my goal when I won't knowingly feel anxious on a daily basis.      Objective #A (Patient Action)    Patient will identify triggers/ fears / thoughts that contribute to feeling anxious.  Status: New - Date: 2/05/2024      Intervention(s)  Psychodynamic   CBT   Internal Family Systems (IFS)    Objective #B  Patient will use at least 1 coping skills for anxiety management per week.  Status: New - Date: 2/05/2024      Intervention(s)  Therapist will teach emotional regulation skills. Such as: mindfulness, breathing techniques, meditation, visualization  .  ACT (ACT)   Cognitive Behavioral Therapy (CBT)   Internal Family Systems (IFS)    Objective #C  Patient will get 7 or more hours of sleep per night.  Status: New - Date: 2/05/2024      Intervention(s)  Psycho-education  Motivational Interviewing      Patient has reviewed and agreed to the above plan.      Ro Harp, [facility]  February 5, 2024

## 2024-03-18 ENCOUNTER — VIRTUAL VISIT (OUTPATIENT)
Dept: PSYCHOLOGY | Facility: CLINIC | Age: 29
End: 2024-03-18
Payer: COMMERCIAL

## 2024-03-18 DIAGNOSIS — F40.10 SOCIAL ANXIETY DISORDER: Primary | ICD-10-CM

## 2024-03-18 PROCEDURE — 90834 PSYTX W PT 45 MINUTES: CPT | Mod: 95 | Performed by: SOCIAL WORKER

## 2024-03-18 NOTE — PROGRESS NOTES
"[facility]                                     Progress Note    Patient Name: Cami Cardenas  Date: 2024       Service Type: Individual      Session Start Time: 9:39 AM  Session End Time: 10:31 AM     Session Length:  52 min   Session #: 6    Attendees: Client attended alone    Service Modality:  Video Visit:      Provider verified identity through the following two step process.  Patient provided:  Patient  and Patient is known previously to provider    Telemedicine Visit: The patient's condition can be safely assessed and treated via synchronous audio and visual telemedicine encounter.      Reason for Telemedicine Visit: Patient convenience (e.g. access to timely appointments / distance to available provider)    Originating Site (Patient Location): Patient's home    Distant Site (Provider Location): Provider Remote Setting- Home Office    Consent:  The patient/guardian has verbally consented to: the potential risks and benefits of telemedicine (video visit) versus in person care; bill my insurance or make self-payment for services provided; and responsibility for payment of non-covered services.     Patient would like the video invitation sent by:  My Chart    Mode of Communication:  Video Conference via [facility]    Distant Location (Provider):  On-site    As the provider I attest to compliance with applicable laws and regulations related to telemedicine.    DATA  Interactive Complexity: No  Crisis: No        Progress Since Last Session (Related to Symptoms / Goals / Homework):   Symptoms:  Patient reports anxiety symptoms.  The patient reports that Saturday she \"woke up feeling panicky\" and reports that the panicky feeling \"spilled over into .\" The reports that she \"felt that pit in my stomach.\"    Homework: Partially completed      Episode of Care Goals: Satisfactory progress - ACTION (Actively working towards change); Intervened by reinforcing change plan / affirming " steps taken     Current / Ongoing Stressors and Concerns:   The patient identified the following stressors and concerns: patient had a bat in her basement sink (patient and her father were able to get the bat outside) and work related stress. The patient processed through her internal experiences related to: her role and responsibilities at work, her cats, her recent social interactions and her interpersonal relationships.      Treatment Objective(s) Addressed in This Session:   Continued to address:  Identify triggers/ fears / thoughts that contribute to feeling anxious  Patient will use at least 1 coping skills for anxiety management per week     Intervention:   Motivational Interviewing: co-developed short-term goals and reviewed progress in session  Psychodynamic: processed through internal experiences   Psycho-education - on  the benefits of meditation   Internal Family Systems - Therapist utilized IFS to facilitate Patient Self to Patient Part connection   Validation and Normalization    Assessments completed prior to visit: None  The following assessments were completed by patient for this visit: None      ASSESSMENT: Current Emotional / Mental Status (status of significant symptoms):   Risk status (Self / Other harm or suicidal ideation)   Patient denies current fears or concerns for personal safety.   Patient denies current or recent suicidal ideation or behaviors.   Patient denies current or recent homicidal ideation or behaviors.   Patient denies current or recent self injurious behavior or ideation.   Patient denies other safety concerns.   Patient reports there has been no change in risk factors since their last session.     Patient reports there has been no change in protective factors since their last session.     Recommended that patient call 911 or go to the local ED should there be a change in any of these risk factors.     Appearance:   Appropriate    Eye Contact:   Good    Psychomotor  "Behavior: Normal    Attitude:   Cooperative  Interested Friendly Pleasant Attentive   Orientation:   All   Speech    Rate / Production: Normal/ Responsive    Volume:  Normal    Mood:    Anxious  Normal   Affect:    Appropriate    Thought Content:  Clear    Thought Form:  Coherent  Logical    Insight:    Good      Medication Review:   No current psychiatric medications prescribed     Medication Compliance:   No     Changes in Health Issues:   None reported     Chemical Use Review:   Substance Use: Chemical use reviewed, no active concerns identified      Tobacco Use: No current tobacco use.      Diagnosis:  1. Social anxiety disorder      Collateral Reports Completed:   Not Applicable    PLAN: (Patient Tasks / Therapist Tasks / Other)  Patient plans try to find the \"void space\" again if she notices the \"pit\" feeling in her stomach.   Patient plans to practice box breathing.   Patient will continue to use Insight Timer (look into void meditations).  Patient will return for follow up: 3/28/24  Continue to utilize IFS.       Ro Harp, [facility]                                                         ______________________________________________________________________    Individual Treatment Plan    Patient's Name: Cami Cardenas  YOB: 1995    Date of Creation: February 5, 2024    Date Treatment Plan Last Reviewed/Revised: February 5, 2024      DSM5 Diagnoses: 300.23 (F40.10) Social Anxiety Disorder  Psychosocial / Contextual Factors:  Patient reports work related stress.    PROMIS (reviewed every 90 days): 17    Referral / Collaboration:  Referral to another professional/service is not indicated at this time..    Anticipated number of session for this episode of care: 9-12 sessions  Anticipation frequency of session: Every other week  Anticipated Duration of each session: 38-52 minutes  Treatment plan will be reviewed in 90 days or when goals have been changed.       MeasurableTreatment Goal(s) " related to diagnosis / functional impairment(s)  Goal 1: Reduce severity of anxiety symptoms from Moderate to Minimal using the FLORENTIN-7 for measurement.     I will know I've met my goal when I won't knowingly feel anxious on a daily basis.      Objective #A (Patient Action)    Patient will identify triggers/ fears / thoughts that contribute to feeling anxious.  Status: New - Date: 2/05/2024      Intervention(s)  Psychodynamic   CBT   Internal Family Systems (IFS)    Objective #B  Patient will use at least 1 coping skills for anxiety management per week.  Status: New - Date: 2/05/2024      Intervention(s)  Therapist will teach emotional regulation skills. Such as: mindfulness, breathing techniques, meditation, visualization  .  ACT (ACT)   Cognitive Behavioral Therapy (CBT)   Internal Family Systems (IFS)    Objective #C  Patient will get 7 or more hours of sleep per night.  Status: New - Date: 2/05/2024      Intervention(s)  Psycho-education  Motivational Interviewing      Patient has reviewed and agreed to the above plan.      Ro Harp, [facility]  February 5, 2024

## 2024-04-01 ENCOUNTER — VIRTUAL VISIT (OUTPATIENT)
Dept: PSYCHOLOGY | Facility: CLINIC | Age: 29
End: 2024-04-01
Payer: COMMERCIAL

## 2024-04-01 DIAGNOSIS — F40.10 SOCIAL ANXIETY DISORDER: Primary | ICD-10-CM

## 2024-04-01 PROCEDURE — 90834 PSYTX W PT 45 MINUTES: CPT | Mod: 95 | Performed by: SOCIAL WORKER

## 2024-04-01 NOTE — PROGRESS NOTES
[facility]                                     Progress Note    Patient Name: Cami Cardenas  Date: 2024       Service Type: Individual      Session Start Time: 9:44 AM  Session End Time: 10:33 AM     Session Length:  48 min   Session #: 7    Attendees: Client attended alone    Service Modality:  Video Visit:      Provider verified identity through the following two step process.  Patient provided:  Patient  and Patient is known previously to provider    Telemedicine Visit: The patient's condition can be safely assessed and treated via synchronous audio and visual telemedicine encounter.      Reason for Telemedicine Visit: Patient convenience (e.g. access to timely appointments / distance to available provider)    Originating Site (Patient Location): Patient's home    Distant Site (Provider Location): Provider Remote Setting- Home Office    Consent:  The patient/guardian has verbally consented to: the potential risks and benefits of telemedicine (video visit) versus in person care; bill my insurance or make self-payment for services provided; and responsibility for payment of non-covered services.     Patient would like the video invitation sent by:  My Chart    Mode of Communication:  Video Conference via [facility]    Distant Location (Provider):  On-site    As the provider I attest to compliance with applicable laws and regulations related to telemedicine.    DATA  Interactive Complexity: No  Crisis: No        Progress Since Last Session (Related to Symptoms / Goals / Homework):   Symptoms: No change in symptoms reported. Patient continues to report social anxiety related symptoms.      Homework: Achieved / completed to satisfaction      Episode of Care Goals: Satisfactory progress - ACTION (Actively working towards change); Intervened by reinforcing change plan / affirming steps taken     Current / Ongoing Stressors and Concerns:   The patient identified the following stressors  and concerns: the patient reports that she has had a busy week and reports work related stress. The patient reports that she has been feeling frustrated and angry with her manager at work. The patient processed through her internal experiences related to: her recent experiences at work, her interpersonal relationships and her support system. Therapist and patient also explored the patient's relational and communication patterns. Therapist and patient explored ways she can release her frustration and anger at the end of the work day.      Treatment Objective(s) Addressed in This Session:   Continued to address:  Identify triggers/ fears / thoughts that contribute to feeling anxious  Patient will use at least 1 coping skills for anxiety management per week     Intervention:   Motivational Interviewing: co-developed short-term goals and reviewed progress in session  Psychodynamic: processed through internal experiences   Psycho-education    Internal Family Systems - Discussed importance of reassuring activated parts as needed   ACT   Validation and Normalization    Assessments completed prior to visit: None  The following assessments were completed by patient for this visit: None      ASSESSMENT: Current Emotional / Mental Status (status of significant symptoms):   Risk status (Self / Other harm or suicidal ideation)   Patient denies current fears or concerns for personal safety.   Patient denies current or recent suicidal ideation or behaviors.   Patient denies current or recent homicidal ideation or behaviors.   Patient denies current or recent self injurious behavior or ideation.   Patient denies other safety concerns.   Patient reports there has been no change in risk factors since their last session.     Patient reports there has been no change in protective factors since their last session.     Recommended that patient call 911 or go to the local ED should there be a change in any of these risk  "factors.     Appearance:   Appropriate    Eye Contact:   Good    Psychomotor Behavior: Normal    Attitude:   Cooperative  Interested Friendly Pleasant Attentive   Orientation:   All   Speech    Rate / Production: Normal/ Responsive    Volume:  Normal    Mood:    Anxious  Normal   Affect:    Appropriate    Thought Content:  Clear    Thought Form:  Coherent  Logical    Insight:    Good      Medication Review:   No current psychiatric medications prescribed     Medication Compliance:   No     Changes in Health Issues:   None reported     Chemical Use Review:   Substance Use: Chemical use reviewed, no active concerns identified      Tobacco Use: No current tobacco use.      Diagnosis:  1. Social anxiety disorder        Collateral Reports Completed:   Not Applicable    PLAN: (Patient Tasks / Therapist Tasks / Other)  Patient plans to practice not letting things build up/releasing stress an frustration at the end of her work day or throughout the day (breathing techniques - breath out stress and frustration, shower-let the water wash away the stress, physical activity - go for a walk, or vent to a friend)   Patient plans spend time in the \"void space\"   Patient will continue to use Insight Timer  Patient will return for follow up: 4/15/24  Continue to utilize IFS.      Ro Harp, [facility]                                                         ______________________________________________________________________    Individual Treatment Plan    Patient's Name: Cami Cardenas  YOB: 1995    Date of Creation: February 5, 2024    Date Treatment Plan Last Reviewed/Revised: February 5, 2024      DSM5 Diagnoses: 300.23 (F40.10) Social Anxiety Disorder  Psychosocial / Contextual Factors:  Patient reports work related stress.    PROMIS (reviewed every 90 days): 17    Referral / Collaboration:  Referral to another professional/service is not indicated at this time..    Anticipated number of session for this " episode of care: 9-12 sessions  Anticipation frequency of session: Every other week  Anticipated Duration of each session: 38-52 minutes  Treatment plan will be reviewed in 90 days or when goals have been changed.       MeasurableTreatment Goal(s) related to diagnosis / functional impairment(s)  Goal 1: Reduce severity of anxiety symptoms from Moderate to Minimal using the FLORENTIN-7 for measurement.     I will know I've met my goal when I won't knowingly feel anxious on a daily basis.      Objective #A (Patient Action)    Patient will identify triggers/ fears / thoughts that contribute to feeling anxious.  Status: New - Date: 2/05/2024      Intervention(s)  Psychodynamic   CBT   Internal Family Systems (IFS)    Objective #B  Patient will use at least 1 coping skills for anxiety management per week.  Status: New - Date: 2/05/2024      Intervention(s)  Therapist will teach emotional regulation skills. Such as: mindfulness, breathing techniques, meditation, visualization  .  ACT (ACT)   Cognitive Behavioral Therapy (CBT)   Internal Family Systems (IFS)    Objective #C  Patient will get 7 or more hours of sleep per night.  Status: New - Date: 2/05/2024      Intervention(s)  Psycho-education  Motivational Interviewing      Patient has reviewed and agreed to the above plan.      Ro Harp, [facility]  February 5, 2024

## 2024-04-15 ENCOUNTER — VIRTUAL VISIT (OUTPATIENT)
Dept: PSYCHOLOGY | Facility: CLINIC | Age: 29
End: 2024-04-15
Payer: COMMERCIAL

## 2024-04-15 DIAGNOSIS — F40.10 SOCIAL ANXIETY DISORDER: Primary | ICD-10-CM

## 2024-04-15 PROCEDURE — 90837 PSYTX W PT 60 MINUTES: CPT | Mod: 95 | Performed by: SOCIAL WORKER

## 2024-04-15 NOTE — PROGRESS NOTES
[facility]                                     Progress Note    Patient Name: Cami Cardenas  Date: April 15, 2024         Service Type: Individual      Session Start Time: 8:43 AM (Session started late due to technical issues - patient's audio was not working at first)  Session End Time:  9:37 AM     Session Length:  54 min   Session #: 8    Attendees: Client attended alone    Service Modality:  Video Visit:      Provider verified identity through the following two step process.  Patient provided:  Patient  and Patient is known previously to provider    Telemedicine Visit: The patient's condition can be safely assessed and treated via synchronous audio and visual telemedicine encounter.      Reason for Telemedicine Visit: Patient convenience (e.g. access to timely appointments / distance to available provider)    Originating Site (Patient Location): Patient's home    Distant Site (Provider Location): Provider Remote Setting- Home Office    Consent:  The patient/guardian has verbally consented to: the potential risks and benefits of telemedicine (video visit) versus in person care; bill my insurance or make self-payment for services provided; and responsibility for payment of non-covered services.     Patient would like the video invitation sent by:  My Chart    Mode of Communication:  [facility]    Distant Location (Provider):  On-site    As the provider I attest to compliance with applicable laws and regulations related to telemedicine.    DATA  Interactive Complexity: No  Crisis: No        Progress Since Last Session (Related to Symptoms / Goals / Homework):   Symptoms: No change in symptoms reported. Patient continues to report social anxiety related symptoms.      Homework: Achieved / completed to satisfaction      Episode of Care Goals: Satisfactory progress - ACTION (Actively working towards change); Intervened by reinforcing change plan / affirming steps  "taken     Current / Ongoing Stressors and Concerns:   The patient identified the following stressors and concerns: \"work has been really busy\". The patient processed through her internal experiences experiences related to: her job description, her current life circumstances, her recent social interactions, and her interpersonal relationships. Therapist and patient explored ways she can manage her anxiety symptoms at work.      Treatment Objective(s) Addressed in This Session:   Continued to address:  Identify triggers/ fears / thoughts that contribute to feeling anxious  Patient will use at least 1 coping skills for anxiety management per week     Intervention:   Motivational Interviewing: co-developed short-term goals and reviewed progress in session  Psychodynamic: processed through internal experiences   Psycho-education    ACT   Validation and Normalization    Assessments completed prior to visit: None  The following assessments were completed by patient for this visit: None      ASSESSMENT: Current Emotional / Mental Status (status of significant symptoms):   Risk status (Self / Other harm or suicidal ideation)   Patient denies current fears or concerns for personal safety.   Patient denies current or recent suicidal ideation or behaviors.   Patient denies current or recent homicidal ideation or behaviors.   Patient denies current or recent self injurious behavior or ideation.   Patient denies other safety concerns.   Patient reports there has been no change in risk factors since their last session.     Patient reports there has been no change in protective factors since their last session.     Recommended that patient call 911 or go to the local ED should there be a change in any of these risk factors.     Appearance:   Appropriate    Eye Contact:   Good    Psychomotor Behavior: Normal    Attitude:   Cooperative  Interested Friendly Pleasant Attentive   Orientation:   All   Speech    Rate / Production: Normal/ " "Responsive    Volume:  Normal    Mood:    Anxious  Normal   Affect:    Appropriate    Thought Content:  Clear    Thought Form:  Coherent  Goal Directed  Logical    Insight:    Good      Medication Review:   No current psychiatric medications prescribed     Medication Compliance:   No     Changes in Health Issues:   None reported     Chemical Use Review:   Substance Use: Chemical use reviewed, no active concerns identified      Tobacco Use: No current tobacco use.      Diagnosis:  1. Social anxiety disorder      Collateral Reports Completed:   Not Applicable    PLAN: (Patient Tasks / Therapist Tasks / Other)  Patient plans to practice not letting things build up/releasing stress and frustration at the end of her work day or throughout the day (breathing techniques - breath out stress and frustration, shower-let the water wash away the stress, physical activity - go for a walk, or vent to a friend)   Patient plans to make plans with her friends  Patient plans spend time in the \"void space\"   Patient will continue to use Insight Timer  Patient will return for follow up: 4/15/24  Continue to utilize IFS.      Ro Harp, [facility]                                                         ______________________________________________________________________    Individual Treatment Plan    Patient's Name: Cami Cardenas  YOB: 1995    Date of Creation: February 5, 2024    Date Treatment Plan Last Reviewed/Revised: February 5, 2024      DSM5 Diagnoses: 300.23 (F40.10) Social Anxiety Disorder  Psychosocial / Contextual Factors:  Patient reports work related stress.    PROMIS (reviewed every 90 days): 17    Referral / Collaboration:  Referral to another professional/service is not indicated at this time..    Anticipated number of session for this episode of care: 9-12 sessions  Anticipation frequency of session: Every other week  Anticipated Duration of each session: 38-52 minutes  Treatment plan will be " reviewed in 90 days or when goals have been changed.       MeasurableTreatment Goal(s) related to diagnosis / functional impairment(s)  Goal 1: Reduce severity of anxiety symptoms from Moderate to Minimal using the FLORENTIN-7 for measurement.     I will know I've met my goal when I won't knowingly feel anxious on a daily basis.      Objective #A (Patient Action)    Patient will identify triggers/ fears / thoughts that contribute to feeling anxious.  Status: New - Date: 2/05/2024      Intervention(s)  Psychodynamic   CBT   Internal Family Systems (IFS)    Objective #B  Patient will use at least 1 coping skills for anxiety management per week.  Status: New - Date: 2/05/2024      Intervention(s)  Therapist will teach emotional regulation skills. Such as: mindfulness, breathing techniques, meditation, visualization  .  ACT (ACT)   Cognitive Behavioral Therapy (CBT)   Internal Family Systems (IFS)    Objective #C  Patient will get 7 or more hours of sleep per night.  Status: New - Date: 2/05/2024      Intervention(s)  Psycho-education  Motivational Interviewing      Patient has reviewed and agreed to the above plan.      Ro Harp, [facility]  February 5, 2024

## 2024-04-29 ENCOUNTER — VIRTUAL VISIT (OUTPATIENT)
Dept: PSYCHOLOGY | Facility: CLINIC | Age: 29
End: 2024-04-29
Payer: COMMERCIAL

## 2024-04-29 DIAGNOSIS — F40.10 SOCIAL ANXIETY DISORDER: Primary | ICD-10-CM

## 2024-04-29 PROCEDURE — 90837 PSYTX W PT 60 MINUTES: CPT | Mod: 95 | Performed by: SOCIAL WORKER

## 2024-04-29 NOTE — PROGRESS NOTES
"[facility]                                     Progress Note    Patient Name: Cami Cardenas  Date: 2024       Service Type: Individual      Session Start Time: 9:42 AM (Session started late due to technical issues - patient's audio was not working at first)  Session End Time: 10:37 AM     Session Length:  55 min   Session #: 9    Attendees: Client attended alone    Service Modality:  Video Visit:      Provider verified identity through the following two step process.  Patient provided:  Patient  and Patient is known previously to provider    Telemedicine Visit: The patient's condition can be safely assessed and treated via synchronous audio and visual telemedicine encounter.      Reason for Telemedicine Visit: Patient convenience (e.g. access to timely appointments / distance to available provider)    Originating Site (Patient Location): Patient's home    Distant Site (Provider Location): Provider Remote Setting- Home Office    Consent:  The patient/guardian has verbally consented to: the potential risks and benefits of telemedicine (video visit) versus in person care; bill my insurance or make self-payment for services provided; and responsibility for payment of non-covered services.     Patient would like the video invitation sent by:  My Chart    Mode of Communication:  Video Conference via [employer]    Distant Location (Provider):  On-site    As the provider I attest to compliance with applicable laws and regulations related to telemedicine.    DATA  Interactive Complexity: No  Crisis: No        Progress Since Last Session (Related to Symptoms / Goals / Homework):   Symptoms: Improving - patient reports a decrease in social anxiety symptoms.  The patient reports \"low grade stress\" from work related stress.     Homework: Achieved / completed to satisfaction      Episode of Care Goals: Satisfactory progress - ACTION (Actively working towards change); Intervened by reinforcing " change plan / affirming steps taken     Current / Ongoing Stressors and Concerns:   The patient identified the following stressors and concerns: work related stress.   The patient reports that she has  using the following coping strategies: doodling at work and talking /venting to her friends. The patient processed through her internal experiences related to: her job,  her recent interpersonal interactions and her current life circumstances.     Treatment Objective(s) Addressed in This Session:   Continued to address:  Identify triggers/ fears / thoughts that contribute to feeling anxious  Patient will use at least 1 coping skill for anxiety management per week     Intervention:   Motivational Interviewing: co-developed short-term goals and reviewed progress in session  Psychodynamic: processed through internal experiences   Psycho-education    ACT   Validation and Normalization    Assessments completed prior to visit: None  The following assessments were completed by patient for this visit: None      ASSESSMENT: Current Emotional / Mental Status (status of significant symptoms):   Risk status (Self / Other harm or suicidal ideation)   Patient denies current fears or concerns for personal safety.   Patient denies current or recent suicidal ideation or behaviors.   Patient denies current or recent homicidal ideation or behaviors.   Patient denies current or recent self injurious behavior or ideation.   Patient denies other safety concerns.   Patient reports there has been no change in risk factors since their last session.     Patient reports there has been no change in protective factors since their last session.     Recommended that patient call 911 or go to the local ED should there be a change in any of these risk factors.     Appearance:   Appropriate    Eye Contact:   Good    Psychomotor Behavior: Normal    Attitude:   Cooperative  Interested Friendly Pleasant Attentive   Orientation:   All   Speech    Rate /  "Production: Normal/ Responsive    Volume:  Normal    Mood:    Anxious  Normal   Affect:    Appropriate    Thought Content:  Clear    Thought Form:  Coherent  Goal Directed  Logical    Insight:    Good      Medication Review:   No current psychiatric medications prescribed     Medication Compliance:   No     Changes in Health Issues:   None reported     Chemical Use Review:   Substance Use: Chemical use reviewed, no active concerns identified      Tobacco Use: No current tobacco use.      Diagnosis:  1. Social anxiety disorder        Collateral Reports Completed:   Not Applicable    PLAN: (Patient Tasks / Therapist Tasks / Other)  Patient plans to continue to practice not letting things build up/releasing stress and frustration at the end of her work day or throughout the day (breathing techniques - breath out stress and frustration, shower-let the water wash away the stress, physical activity - go for a walk, or vent to a friend)   Patient plans to give herself a 5 minute time limit to vent her frustrations.   Patient will practice: \"What if everything goes right?\"  Patient will return for follow up: 05/13/24    Continue to meet every other week.       Ro Harp, [facility]                                                         ______________________________________________________________________    Individual Treatment Plan    Patient's Name: Cami Cardenas  YOB: 1995    Date of Creation: February 5, 2024    Date Treatment Plan Last Reviewed/Revised: February 5, 2024      DSM5 Diagnoses: 300.23 (F40.10) Social Anxiety Disorder  Psychosocial / Contextual Factors:  Patient reports work related stress.    PROMIS (reviewed every 90 days): 17    Referral / Collaboration:  Referral to another professional/service is not indicated at this time..    Anticipated number of session for this episode of care: 9-12 sessions  Anticipation frequency of session: Every other week  Anticipated Duration of each " session: 38-52 minutes  Treatment plan will be reviewed in 90 days or when goals have been changed.       MeasurableTreatment Goal(s) related to diagnosis / functional impairment(s)  Goal 1: Reduce severity of anxiety symptoms from Moderate to Minimal using the FLORENTIN-7 for measurement.     I will know I've met my goal when I won't knowingly feel anxious on a daily basis.      Objective #A (Patient Action)    Patient will identify triggers/ fears / thoughts that contribute to feeling anxious.  Status: New - Date: 2/05/2024      Intervention(s)  Psychodynamic   CBT   Internal Family Systems (IFS)    Objective #B  Patient will use at least 1 coping skills for anxiety management per week.  Status: New - Date: 2/05/2024      Intervention(s)  Therapist will teach emotional regulation skills. Such as: mindfulness, breathing techniques, meditation, visualization  .  ACT (ACT)   Cognitive Behavioral Therapy (CBT)   Internal Family Systems (IFS)    Objective #C  Patient will get 7 or more hours of sleep per night.  Status: New - Date: 2/05/2024      Intervention(s)  Psycho-education  Motivational Interviewing      Patient has reviewed and agreed to the above plan.      Ro Harp, [facility]  February 5, 2024

## 2024-05-13 ENCOUNTER — VIRTUAL VISIT (OUTPATIENT)
Dept: PSYCHOLOGY | Facility: CLINIC | Age: 29
End: 2024-05-13
Payer: COMMERCIAL

## 2024-05-13 DIAGNOSIS — F40.10 SOCIAL ANXIETY DISORDER: Primary | ICD-10-CM

## 2024-05-13 PROCEDURE — 90837 PSYTX W PT 60 MINUTES: CPT | Mod: 95 | Performed by: SOCIAL WORKER

## 2024-05-13 NOTE — PROGRESS NOTES
"[facility]                                     Progress Note    Patient Name: Cami Cardenas  Date: May 13, 2024         Service Type: Individual      Session Start Time: 8:41 AM (Session started late due to technical issues - patient's audio was not working at first)  Session End Time:  9:40 AM     Session Length:  59 min (Session was 53+ minutes in length due to content of session, progress review, short-term goal setting and scheduling)  Session #: 10    Attendees: Client attended alone    Service Modality:  Video Visit:      Provider verified identity through the following two step process.  Patient provided:  Patient  and Patient is known previously to provider    Telemedicine Visit: The patient's condition can be safely assessed and treated via synchronous audio and visual telemedicine encounter.      Reason for Telemedicine Visit: Patient convenience (e.g. access to timely appointments / distance to available provider)    Originating Site (Patient Location): Patient's home    Distant Site (Provider Location): Provider Remote Setting- Home Office    Consent:  The patient/guardian has verbally consented to: the potential risks and benefits of telemedicine (video visit) versus in person care; bill my insurance or make self-payment for services provided; and responsibility for payment of non-covered services.     Patient would like the video invitation sent by:  My Chart    Mode of Communication:  [facility] Conference [facility]    Distant Location (Provider):  On-site    As the provider I attest to compliance with applicable laws and regulations related to telemedicine.    DATA  Interactive Complexity: No  Crisis: No        Progress Since Last Session (Related to Symptoms / Goals / Homework):   Symptoms: Improving - patient reports a decrease in social anxiety symptoms.  The patient reports \"low grade stress\" from work related stress.     Homework: Achieved / completed to " satisfaction      Episode of Care Goals: Satisfactory progress - ACTION (Actively working towards change); Intervened by reinforcing change plan / affirming steps taken     Current / Ongoing Stressors and Concerns:   The patient identified the following stressors and concerns: patient just injured at work and work related stress. The patient processed through her internal experiences related to: her experience falling through the floor at work, her position at work, and the managers at her place of work.       Treatment Objective(s) Addressed in This Session:   Continued to address:  Identify triggers/ fears / thoughts that contribute to feeling anxious  Patient will use at least 1 coping skill for anxiety management per week     Intervention:   Motivational Interviewing: co-developed short-term goals and reviewed progress in session  Psychodynamic: processed through internal experiences   Psycho-education    ACT   Validation and Normalization    Assessments completed prior to visit: None  The following assessments were completed by patient for this visit: None      ASSESSMENT: Current Emotional / Mental Status (status of significant symptoms):   Risk status (Self / Other harm or suicidal ideation)   Patient denies current fears or concerns for personal safety.   Patient denies current or recent suicidal ideation or behaviors.   Patient denies current or recent homicidal ideation or behaviors.   Patient denies current or recent self injurious behavior or ideation.   Patient denies other safety concerns.   Patient reports there has been no change in risk factors since their last session.     Patient reports there has been no change in protective factors since their last session.     Recommended that patient call 911 or go to the local ED should there be a change in any of these risk factors.     Appearance:   Appropriate    Eye Contact:   Good    Psychomotor Behavior: Normal    Attitude:   Cooperative  Interested  "Friendly Pleasant Attentive   Orientation:   All   Speech    Rate / Production: Normal/ Responsive    Volume:  Normal    Mood:    Anxious  Normal   Affect:    Appropriate    Thought Content:  Clear    Thought Form:  Coherent  Goal Directed  Logical    Insight:    Good      Medication Review:   No current psychiatric medications prescribed     Medication Compliance:   No     Changes in Health Issues:   None reported     Chemical Use Review:   Substance Use: Chemical use reviewed, no active concerns identified      Tobacco Use: No current tobacco use.      Diagnosis:  1. Social anxiety disorder        Collateral Reports Completed:   Not Applicable    PLAN: (Patient Tasks / Therapist Tasks / Other)  Patient plans to continue to practice not letting things build up/releasing stress and frustration at the end of her work day or throughout the day (breathing techniques - breath out stress and frustration, shower-let the water wash away the stress, physical activity - go for a walk, or vent to a friend)   Patient plan to take a moment to reset when or if needed.   Patient will practice: \"What if everything goes right \"What if everything turns out ok?\"  Patient will return for follow up: 06/10/2024    Continue to meet every other week.       Ro Harp, [facility]                                                         ______________________________________________________________________    Individual Treatment Plan    Patient's Name: Cami Cardenas  YOB: 1995    Date of Creation: February 5, 2024    Date Treatment Plan Last Reviewed/Revised: May 15, 2024        DSM5 Diagnoses: 300.23 (F40.10) Social Anxiety Disorder  Psychosocial / Contextual Factors:  Patient reports work related stress.    PROMIS (reviewed every 90 days): 31    Referral / Collaboration:  Referral to another professional/service is not indicated at this time..    Anticipated number of session for this episode of care: 9-12 " sessions  Anticipation frequency of session: Every other week  Anticipated Duration of each session: 38-52 minutes  Treatment plan will be reviewed in 90 days or when goals have been changed.       MeasurableTreatment Goal(s) related to diagnosis / functional impairment(s)  Goal 1: Reduce severity of anxiety symptoms from Moderate to Minimal using the FLORENTIN-7 for measurement.     I will know I've met my goal when I won't knowingly feel anxious on a daily basis.      Objective #A (Patient Action)    Patient will identify triggers/ fears / thoughts that contribute to feeling anxious.  Status: New - Date: 2/05/2024  Continued dates: 5/13/2024    Intervention(s)  Psychodynamic   CBT   Internal Family Systems (IFS)    Objective #B  Patient will use at least 1 coping skills for anxiety management per week.  Status: New - Date: 2/05/2024  Continued dates: 5/13/2024    Intervention(s)  Therapist will teach emotional regulation skills. Such as: mindfulness, breathing techniques, meditation, visualization  .  ACT (ACT)   Cognitive Behavioral Therapy (CBT)   Internal Family Systems (IFS)    Objective #C  Patient will get 7 or more hours of sleep per night.  Status: New - Date: 2/05/2024  Continued dates: 5/13/2024    Intervention(s)  Psycho-education  Motivational Interviewing      Patient has reviewed and agreed to the above plan.      Ro Harp, [facility]  May 13, 2024

## 2024-07-01 ENCOUNTER — VIRTUAL VISIT (OUTPATIENT)
Dept: PSYCHOLOGY | Facility: CLINIC | Age: 29
End: 2024-07-01
Payer: COMMERCIAL

## 2024-07-01 DIAGNOSIS — F40.10 SOCIAL ANXIETY DISORDER: Primary | ICD-10-CM

## 2024-07-01 PROCEDURE — 90837 PSYTX W PT 60 MINUTES: CPT | Mod: 95 | Performed by: SOCIAL WORKER

## 2024-07-01 NOTE — PROGRESS NOTES
[facility]                                     Progress Note    Patient Name: Cami Cardenas  Date: 2024       Service Type: Individual      Session Start Time: 8:41 AM (Session started late due to technical issues - patient's audio was not working at first)  Session End Time:  9:41 AM     Session Length: 60 min (Session was 53+ minutes in length due to content of session, progress review, short-term goal setting and scheduling)  Session #: 11    Attendees: Client attended alone    Service Modality:  Video Visit:      Provider verified identity through the following two step process.  Patient provided:  Patient  and Patient is known previously to provider    Telemedicine Visit: The patient's condition can be safely assessed and treated via synchronous audio and visual telemedicine encounter.      Reason for Telemedicine Visit: Patient convenience (e.g. access to timely appointments / distance to available provider)    Originating Site (Patient Location): Patient's home    Distant Site (Provider Location): Provider Remote Setting- Home Office    Consent:  The patient/guardian has verbally consented to: the potential risks and benefits of telemedicine (video visit) versus in person care; bill my insurance or make self-payment for services provided; and responsibility for payment of non-covered services.     Patient would like the video invitation sent by:  My Chart    Mode of Communication:  Video Conference via [employer]    Distant Location (Provider):  On-site    As the provider I attest to compliance with applicable laws and regulations related to telemedicine.    DATA  Interactive Complexity: No  Crisis: No        Progress Since Last Session (Related to Symptoms / Goals / Homework):   Symptoms: No change in symptoms reported.   The patient reports work related stress.     Homework: Achieved / completed to satisfaction      Episode of Care Goals: Satisfactory progress - ACTION  (Actively working towards change); Intervened by reinforcing change plan / affirming steps taken     Current / Ongoing Stressors and Concerns:   The patient identified the following stressors and concerns: work related stress(there have been recent layoffs at work). The patient processed through her internal experiences related to: the recent layoffs and meetings at there work place.       Treatment Objective(s) Addressed in This Session:   Continued to address:  Identify triggers/ fears / thoughts that contribute to feeling anxious  Patient will use at least 1 coping skill for anxiety management per week     Intervention:   Motivational Interviewing: co-developed short-term goals and reviewed progress in session  Psychodynamic: processed through internal experiences   Psycho-education    ACT   Validation and Normalization    Assessments completed prior to visit: None  The following assessments were completed by patient for this visit: None      ASSESSMENT: Current Emotional / Mental Status (status of significant symptoms):   Risk status (Self / Other harm or suicidal ideation)   Patient denies current fears or concerns for personal safety.   Patient denies current or recent suicidal ideation or behaviors.   Patient denies current or recent homicidal ideation or behaviors.   Patient denies current or recent self injurious behavior or ideation.   Patient denies other safety concerns.   Patient reports there has been no change in risk factors since their last session.     Patient reports there has been no change in protective factors since their last session.     Recommended that patient call 911 or go to the local ED should there be a change in any of these risk factors.     Appearance:   Appropriate    Eye Contact:   Good    Psychomotor Behavior: Normal    Attitude:   Cooperative  Interested Friendly Pleasant Attentive   Orientation:   All   Speech    Rate / Production: Normal/ Responsive    Volume:  Normal  "   Mood:    Anxious  Normal   Affect:    Appropriate    Thought Content:  Clear    Thought Form:  Coherent  Logical    Insight:    Good      Medication Review:   No current psychiatric medications prescribed     Medication Compliance:   No     Changes in Health Issues:   None reported     Chemical Use Review:   Substance Use: Chemical use reviewed, no active concerns identified      Tobacco Use: No current tobacco use.      Diagnosis:  1. Social anxiety disorder        Collateral Reports Completed:   Not Applicable    PLAN: (Patient Tasks / Therapist Tasks / Other)  Patient plans to continue to practice not letting things build up/releasing stress and frustration at the end of her work day or throughout the day (breathing techniques - breath out stress and frustration, shower-let the water wash away the stress, physical activity - go for a walk, or vent to a friend)   Patient will practice identifying moments she feels good about or positive moments she witnessed.  Patient will think of the reasons why she likes her job on her commute to work.   Patient will continue to practice: \"What if everything goes right \"What if everything turns out ok?\"  Patient will return for follow up: 07/15/2024    Continue to meet every other week.       Ro Harp, [facility]                                                         ______________________________________________________________________    Individual Treatment Plan    Patient's Name: Cami Cardenas  YOB: 1995    Date of Creation: February 5, 2024    Date Treatment Plan Last Reviewed/Revised: May 15, 2024        DSM5 Diagnoses: 300.23 (F40.10) Social Anxiety Disorder  Psychosocial / Contextual Factors:  Patient reports work related stress.    PROMIS (reviewed every 90 days): 31    Referral / Collaboration:  Referral to another professional/service is not indicated at this time..    Anticipated number of session for this episode of care: 9-12 " sessions  Anticipation frequency of session: Every other week  Anticipated Duration of each session: 38-52 minutes  Treatment plan will be reviewed in 90 days or when goals have been changed.       MeasurableTreatment Goal(s) related to diagnosis / functional impairment(s)  Goal 1: Reduce severity of anxiety symptoms from Moderate to Minimal using the FLORENTIN-7 for measurement.     I will know I've met my goal when I won't knowingly feel anxious on a daily basis.      Objective #A (Patient Action)    Patient will identify triggers/ fears / thoughts that contribute to feeling anxious.  Status: New - Date: 2/05/2024  Continued dates: 5/13/2024    Intervention(s)  Psychodynamic   CBT   Internal Family Systems (IFS)    Objective #B  Patient will use at least 1 coping skills for anxiety management per week.  Status: New - Date: 2/05/2024  Continued dates: 5/13/2024    Intervention(s)  Therapist will teach emotional regulation skills. Such as: mindfulness, breathing techniques, meditation, visualization  .  ACT (ACT)   Cognitive Behavioral Therapy (CBT)   Internal Family Systems (IFS)    Objective #C  Patient will get 7 or more hours of sleep per night.  Status: New - Date: 2/05/2024  Continued dates: 5/13/2024    Intervention(s)  Psycho-education  Motivational Interviewing      Patient has reviewed and agreed to the above plan.      Ro Harp, [facility]  May 13, 2024

## 2024-07-15 ENCOUNTER — VIRTUAL VISIT (OUTPATIENT)
Dept: PSYCHOLOGY | Facility: CLINIC | Age: 29
End: 2024-07-15
Payer: COMMERCIAL

## 2024-07-15 DIAGNOSIS — F40.10 SOCIAL ANXIETY DISORDER: Primary | ICD-10-CM

## 2024-07-15 PROCEDURE — 90837 PSYTX W PT 60 MINUTES: CPT | Mod: 95 | Performed by: SOCIAL WORKER

## 2024-07-15 NOTE — PROGRESS NOTES
[facility]                                     Progress Note    Patient Name: Cami Cardenas  Date: July 15, 2024         Service Type: Individual      Session Start Time: 8:43 AM (Session started late due to technical issues)  Session End Time:  9:42 AM     Session Length: 59 min (Session was 53+ minutes in length due to content of session, progress review, short-term goal setting and scheduling)  Session #: 12    Attendees: Client attended alone    Service Modality:  Video Visit:      Provider verified identity through the following two step process.  Patient provided:  Patient  and Patient is known previously to provider    Telemedicine Visit: The patient's condition can be safely assessed and treated via synchronous audio and visual telemedicine encounter.      Reason for Telemedicine Visit: Patient convenience (e.g. access to timely appointments / distance to available provider)    Originating Site (Patient Location): Patient's home    Distant Site (Provider Location): Provider Remote Setting- Home Office    Consent:  The patient/guardian has verbally consented to: the potential risks and benefits of telemedicine (video visit) versus in person care; bill my insurance or make self-payment for services provided; and responsibility for payment of non-covered services.     Patient would like the video invitation sent by:  My Chart    Mode of Communication:  Video Conference via [employer]    Distant Location (Provider):  On-site    As the provider I attest to compliance with applicable laws and regulations related to telemedicine.    DATA  Interactive Complexity: No  Crisis: No        Progress Since Last Session (Related to Symptoms / Goals / Homework):   Symptoms: No change in symptoms reported.   The patient continues to report anxiety related symptoms.    Homework: Achieved / completed to satisfaction      Episode of Care Goals: Satisfactory progress - ACTION (Actively working towards  change); Intervened by reinforcing change plan / affirming steps taken     Current / Ongoing Stressors and Concerns:   The patient continues to identify the following stressors and concerns: work-related stress. The patient reports that she is starting to look for other employment opportunities.   The patient processed through her internal experiences related to: her recent work related experiences and recent social interactions.      Treatment Objective(s) Addressed in This Session:   Continued to address:  Identify triggers/ fears / thoughts that contribute to feeling anxious  Patient will use at least 1 coping skill for anxiety management per week     Intervention:   Motivational Interviewing: co-developed short-term goals and reviewed progress in session  Psychodynamic: processed through internal experiences   Psycho-education    ACT   Validation and Normalization    Assessments completed prior to visit: None  The following assessments were completed by patient for this visit: None      ASSESSMENT: Current Emotional / Mental Status (status of significant symptoms):   Risk status (Self / Other harm or suicidal ideation)   Patient denies current fears or concerns for personal safety.   Patient denies current or recent suicidal ideation or behaviors.   Patient denies current or recent homicidal ideation or behaviors.   Patient denies current or recent self injurious behavior or ideation.   Patient denies other safety concerns.   Patient reports there has been no change in risk factors since their last session.     Patient reports there has been no change in protective factors since their last session.     Recommended that patient call 911 or go to the local ED should there be a change in any of these risk factors.     Appearance:   Appropriate    Eye Contact:   Good    Psychomotor Behavior: Normal    Attitude:   Cooperative  Interested Friendly Pleasant Attentive   Orientation:   All   Speech    Rate /  "Production: Normal/ Responsive    Volume:  Normal    Mood:    Anxious  Normal Frustrated   Affect:    Appropriate    Thought Content:  Clear    Thought Form:  Coherent  Goal Directed  Logical    Insight:    Good      Medication Review:   No current psychiatric medications prescribed     Medication Compliance:   No     Changes in Health Issues:   None reported     Chemical Use Review:   Substance Use: Chemical use reviewed, no active concerns identified      Tobacco Use: No current tobacco use.      Diagnosis:  1. Social anxiety disorder        Collateral Reports Completed:   Not Applicable    PLAN: (Patient Tasks / Therapist Tasks / Other)  Patient plans to reach out to people when she needs to.  Patient continue to practice not letting things build up/releasing stress and frustration at the end of her work day or throughout the day (breathing techniques - breath out stress and frustration, shower-let the water wash away the stress, physical activity - go for a walk, or vent to a friend)   Patient will practice identifying moments she feels good about or positive moments she witnessed.  Patient will think of the reasons why she likes her job on her commute to work.   Patient will continue to practice: \"What if everything goes right \"What if everything turns out ok?\"  Patient will return for follow up: 08/05/2024    Continue to meet every other week.       Ro Harp, [facility]                                                         ______________________________________________________________________    Individual Treatment Plan    Patient's Name: Cami Cardenas  YOB: 1995    Date of Creation: February 5, 2024    Date Treatment Plan Last Reviewed/Revised: May 15, 2024        DSM5 Diagnoses: 300.23 (F40.10) Social Anxiety Disorder  Psychosocial / Contextual Factors:  Patient reports work related stress.    PROMIS (reviewed every 90 days): 31    Referral / Collaboration:  Referral to another " professional/service is not indicated at this time..    Anticipated number of session for this episode of care: 9-12 sessions  Anticipation frequency of session: Every other week  Anticipated Duration of each session: 38-52 minutes  Treatment plan will be reviewed in 90 days or when goals have been changed.       MeasurableTreatment Goal(s) related to diagnosis / functional impairment(s)  Goal 1: Reduce severity of anxiety symptoms from Moderate to Minimal using the FLORENTIN-7 for measurement.     I will know I've met my goal when I won't knowingly feel anxious on a daily basis.      Objective #A (Patient Action)    Patient will identify triggers/ fears / thoughts that contribute to feeling anxious.  Status: New - Date: 2/05/2024  Continued dates: 5/13/2024    Intervention(s)  Psychodynamic   CBT   Internal Family Systems (IFS)    Objective #B  Patient will use at least 1 coping skills for anxiety management per week.  Status: New - Date: 2/05/2024  Continued dates: 5/13/2024    Intervention(s)  Therapist will teach emotional regulation skills. Such as: mindfulness, breathing techniques, meditation, visualization  .  ACT (ACT)   Cognitive Behavioral Therapy (CBT)   Internal Family Systems (IFS)    Objective #C  Patient will get 7 or more hours of sleep per night.  Status: New - Date: 2/05/2024  Continued dates: 5/13/2024    Intervention(s)  Psycho-education  Motivational Interviewing      Patient has reviewed and agreed to the above plan.      Ro Harp, [facility]  May 13, 2024

## 2024-07-16 NOTE — TELEPHONE ENCOUNTER
FUTURE VISIT INFORMATION      FUTURE VISIT INFORMATION:  Date: 7.17.24  Time: 8:00  Location: [facility]  REFERRAL INFORMATION:  Referring provider:  House  Referring providers clinic:  [facility]  Reason for visit/diagnosis  Seasonal allergic rhinitis, per, med recs in [facility]. [place]      RECORDS REQUESTED FROM:       Clinic name Comments Records Status Imaging Status   [facility] 1.2.24  [facility]    12.20.22  [facility]

## 2024-07-17 ENCOUNTER — OFFICE VISIT (OUTPATIENT)
Dept: ALLERGY | Facility: CLINIC | Age: 29
End: 2024-07-17
Payer: COMMERCIAL

## 2024-07-17 ENCOUNTER — PRE VISIT (OUTPATIENT)
Dept: ALLERGY | Facility: CLINIC | Age: 29
End: 2024-07-17

## 2024-07-17 DIAGNOSIS — J30.2 SEASONAL AND PERENNIAL ALLERGIC RHINOCONJUNCTIVITIS: Primary | ICD-10-CM

## 2024-07-17 DIAGNOSIS — J30.9 ALLERGIC RHINITIS WITH POSTNASAL DRIP: ICD-10-CM

## 2024-07-17 DIAGNOSIS — R09.82 ALLERGIC RHINITIS WITH POSTNASAL DRIP: ICD-10-CM

## 2024-07-17 DIAGNOSIS — J30.89 SEASONAL AND PERENNIAL ALLERGIC RHINOCONJUNCTIVITIS: Primary | ICD-10-CM

## 2024-07-17 DIAGNOSIS — L23.7 ALLERGIC CONTACT DERMATITIS DUE TO PLANTS, EXCEPT FOOD: ICD-10-CM

## 2024-07-17 DIAGNOSIS — H10.10 SEASONAL AND PERENNIAL ALLERGIC RHINOCONJUNCTIVITIS: Primary | ICD-10-CM

## 2024-07-17 DIAGNOSIS — Z87.2 HX OF ATOPIC DERMATITIS: ICD-10-CM

## 2024-07-17 PROCEDURE — 95024 IQ TESTS W/ALLERGENIC XTRCS: CPT | Performed by: DERMATOLOGY

## 2024-07-17 PROCEDURE — 95004 PERQ TESTS W/ALRGNC XTRCS: CPT | Performed by: DERMATOLOGY

## 2024-07-17 PROCEDURE — 95044 PATCH/APPLICATION TESTS: CPT | Performed by: DERMATOLOGY

## 2024-07-17 RX ORDER — MOMETASONE FUROATE MONOHYDRATE 50 UG/1
2 SPRAY, METERED NASAL AT BEDTIME
Qty: 17 G | Refills: 3 | Status: SHIPPED | OUTPATIENT
Start: 2024-07-17

## 2024-07-17 RX ORDER — LORATADINE 10 MG/1
10 TABLET ORAL DAILY
COMMUNITY

## 2024-07-17 NOTE — PROGRESS NOTES
[facility] Dermato-allergology Note  Office visit  Encounter Date: Jul 17, 2024  ____________________________________________    CC: Allergy Consult (Last 2 years primarily in May intensely itchy red eyes, stuffy/runny nose, sneezing, to be able to function in the past when really bad take Claritin, allegra, benadryl, xyxal, zyrtec but still a problem, current regimen when bad 2 claritin, 1 xyzal, flonase and benadryl. Normally 1 claritin and 2 benadry PRN, alternates antihistamines every 90 days d/t losing efficacy. When around mint products, dizziness/nausea/HA, gets rash when on skin, ingestion causes vomiting. )      HPI:  (Jul 17, 2024)  Ms. Cami Cardenas is a(n) 29 year old female who presents today as new patient for allergy consultation  - Referred by Dr. Cristy Pleitez () for seasonal allergic rhinitis  - Reports worsening allergies in spring for the past 2 years with runny nose, itchy eyes, sneezing PND; no issues in fall  - Reports lifetime allergies previously (mostly with stuffy nose year-round) but they have not been as strong in the past  - Takes Claritin and 2 Benadryl as needed daily in off peak seasons  - When flared last year used Claritin, Zyrtec, Allegra, Xyzal daily, and benadryl as needed  - This year in the peak allergy months took 2 Claritin, 1 Xyzal, 2 sprays Flonase in AM and benadryl as needed  - Reactions to mint (spearmint, wintergreen, eucalyptus, fresh)  - If ingested, immediate vomiting if ingested  - If smelled, immediate headaches, dizziness, and nausea  - If skin comes in contact, develops rash a few hours later and lasts for a couple days  - Same reaction with lavender  - No issues with oregano or basil  - Has 2 cats at home, has not had reactions to them; though same reaction as mint if cat snip is strong  - No issues around dogs  - Has not been evaluated by an ENT  - Otherwise feeling well in usual state of health    Physical Exam:  General: In no acute  distress, well-developed, well-nourished  Eyes: no conjunctivitis  ENT: no signs of rhinitis   Pulmonary: no wheezing or coughing  Skin: Focused examination of the skin on test sites was performed = see test results below    Past Medical History:   Patient Active Problem List   Diagnosis    Hematoma of leg    Sprain and strain of hip and thigh    Leg pain    Morbid obesity (H)     Past Medical History:   Diagnosis Date    NO ACTIVE PROBLEMS      Allergies:  Allergies   Allergen Reactions    Dust Mites     Minthoxtachys      Allergic to all mints    Pollen Extract     Ragweeds      Medications:  Current Outpatient Medications   Medication Sig Dispense Refill    diphenhydrAMINE (BENADRYL) 25 MG tablet Take 1-2 tablets (25-50 mg) by mouth every 6 hours as needed for itching or allergies 30 tablet 1    diphenhydrAMINE HCl (ALLERGY MEDICINE PO) Take by mouth daily OTC allergy medicine      loratadine (CLARITIN) 10 MG tablet Take 10 mg by mouth daily      norethindrone (MICRONOR) 0.35 MG tablet Take 1 tablet (0.35 mg) by mouth daily 90 tablet 3    olopatadine (PATANOL) 0.1 % ophthalmic solution INSTILL 1 DROP IN BOTH EYES TWICE DAILY Strength: 0.1 % 5 mL 1     No current facility-administered medications for this visit.     Social History:  The patient works as a  with [employer] Patient has the following hobbies or non-occupational exposure: needle felting, gardening.    Family History:  Family History   Problem Relation Age of Onset    Arthritis Mother     Genetic Disorder Mother         Celiac Disease    High cholesterol Father     Hyperlipidemia Father     Diabetes Paternal Grandmother     Hypertension Paternal Grandmother     Coronary Artery Disease Other         Paternal Great Grandfather    Hypertension Other         Maternal Uncle    Hyperlipidemia Other         Maternal Aunt    Cerebrovascular Disease Other         Paternal Great Grandmother    Other Cancer Other         Maternal Great Uncle;  Kidneys    Mental Illness Other         Alzheimer's in various older Maternal relations    Obesity Other         Maternal Aunts (2)    Hypertension Maternal Grandmother     Hyperlipidemia Maternal Grandmother     Breast Cancer Maternal Grandmother     Colon Cancer Maternal Grandmother     Other Cancer Maternal Grandmother         Kidneys    Hypertension Maternal Grandfather     Hyperlipidemia Maternal Grandfather     Prostate Cancer Maternal Grandfather     Other Cancer Maternal Grandfather         Bone Cancer (C.O.D.)    Prostate Cancer Paternal Grandfather     Depression Paternal Grandfather     Anxiety Disorder Paternal Grandfather     Mental Illness Paternal Grandfather         Parkinson's and Dementia     Previous Labs, Allergy Tests, Dermatopathology, Imaging:  [Upon review of [facility] chart, no prior allergist or ENT records as of 7/17/24.]  FROM HPI:  Around memorial day her allergies got really bad to the point that she was on every OTC medicine at once to function. It hurt to have her eyes open. Itched to have them closed. Wondering what could be option for treatment.     FROM A&P:  Allergic conjunctivitis, bilateral  Seasonal allergies    patanol has been helpful for her intermittent symptoms.  She requests allergy referral today     Referred By: Cristy Pleitez MD ()  [address]  [place] [address]     Allergy Tests:  Past Allergy Test - as of 7/17/24, no prior testing per patient.    Order for Future Allergy Testing:    [x] Outpatient  [] Inpatient: [facility]..../ Bed ....       Skin Atopy (atopic dermatitis) [x] Yes   [] No .........as a child, cleared by puberty   Contact allergies:   [x] Yes   [] No ..........see HPI re: mint, lavender; no issues with metals or bandages/adhesives  Hand eczema:   [] Yes   [x] No           Leading hand:   [x] R   [] L       [] Ambidextrous         Drug allergies:        [] Yes   [x] No  which?......    Urticaria/Angioedema  [] Yes   [x] No .........  Food  Allergy:  [x] Yes   [] No  which?......see HPI re: mint  Pets :  [] Yes   [x] No  which?......         [x]  Rhinitis   [x] Conjunctivitis   [] Sinusitis   [] Polyposis   [] Otitis   [] Pharyngitis         [x]  Postnasal drip    []  none  Operations:   [] Tonsils   [] Septum   [] Sinus   [] Polyposis        [] Asthma bronchiale   [] Coughing      [x]  none  Symptoms (mostly rhinoconjunctivitis and asthma) aggravated by:  Season   [] I   [] II   [] III   [] IV   [x]V   [x]VI   []VII   []VIII   []IX   []X   []XI   []XII     [x] perennial (worse in the spring months)  Day time      [] morning   [] noon      [] evening        [] night    [x] whole day........  []  none  Location/changes    [x] inside        [x] outside   [] mountains    [] sea     [] others.............   []  none  Triggers, specific     [] animals     [x] plants     [x] dust              [] others ...........................    []  None   Triggers, others       [x] work          [] psyche    [] sport            [] others .............................  []  none  Irritant                [] phys efforts [] smoke    [] heat/cold     [] odors  []others............... [x]  none    Order for PATCH TESTS  Reason for tests (suspected allergy): dermatitis  Known previous allergies: none  Standardized panels  [] Standard panel (40 tests)  [] Preservatives & Antimicrobials (31 tests)  [] Emulsifiers & Additives (25 tests)   [] Perfumes/Flavours & Plants (25 tests)  [] Hairdresser panel (12 tests)  [] Rubber Chemicals (22 tests)  [] Plastics (26 tests)  [] Colorants/Dyes/Food additives (20 tests)  [] Metals (implants/dental) (24 tests)  [] Local anaesthetics/NSAIDs (13 tests)  [] Antibiotics & Antimycotics (14 tests)   [] Corticosteroids (15 tests)   [] Photopatch test (62 tests)   [] Others:       [] Patient's Own Products:   DO NOT test if chemical or biological identity is unknown!   always ask from patient the product information and safety sheets (MSDS)        Order for PRICK TESTS    Reason for tests (suspected allergy): perennial rhinitis and PND; RC in May and June; H/O atopic dermatitis in childhood  Known previous allergies: none    Standardized prick panels  [x] Atopic panel (20 tests)  [] Pediatric Panel (12 tests)  [] Milk, Meat, Eggs, Grains (20 tests)   [] Dust, Epithelia, Feathers (10 tests)  [] Fish, Seafood, Shellfish (17 tests)  [] Nuts, Beans (14 tests)  [] Spice, Vegetable, Fruit (17 tests)  [] Pollen Panel = Tree, Grass, Weed (24 tests)  [x] Others: lavender and mint from Fragrance Panel of patch tests      [] Patient's Own Products:   DO NOT test if chemical or biological identity is unknown!   always ask from patient the product information and safety sheets (MSDS)     Standardized intradermal tests    [x] Alternaria alternata  [x] Aspergillus fumigatus  [x] Cladosporium herbarum   [x] Penicillium notatum  [x] Dermatophagoides farinae  [x] Dermatophagoides pteronyssinus  [] Dog Epithelium  [x] Cat Epithelium  [] Others:     [] Bee venom   [] Wasp venom  !!Specific protocol with dilutions!!       Order for Drug allergy tests (prick & intradermal & patch tests)    [] Penicillin G  [] Ampicillin [] Cefazolin   [] Ceftriaxone   [] Ceftazidime  [] Bactrim    [] Others:   Order for ... as test date    Atopy Screen (Placed Jul 17, 2024)  No Substance Readings (15 min) Evaluation   POS Histamine 1mg/ml +/++    NEG NaCl 0.9% -      No Substance Readings (15 min) Evaluation   1 Alternaria alternata (tenuis)  -    2 Cladosporium herbarum -    3 Aspergillus fumigatus -    4 Penicillium notatum -    5 Dermatophagoides pteronyssinus -    6 Dermatophagoides farinae -    7 Dog epithelium (canis spp) -    8 Cat hair (jose catus) -    9 Cockroach   (Blatella americana & germanica) -    10 Grass mix midwest   (Leilani, Orchard, Redtop, Cr) -    11 Jose grass (sorghum halepense) -    12 Weed mix   (common Cocklebur, Lamb s quarters, rough redroot Pigweed,  Dock/Sorrel) -    13 Mug wort (artemisia vulgare) -    14 Ragweed giant/short (ambrosia spp) -    15 White birch (Betula papyrifera) -    16 Tree mix 1 (Pecan, Maple BHR, Oak RVW, american [place], black Onley) -    17 Red cedar (juniperus virginia) -    18 Tree mix 2   (white Zechariah, river/red Birch, black [place], common Treasure, american Elm) -    19 Box elder/Maple mix (acer spp) -    20 Clements shagbark (carya ovata) -     21 Mentha Piperita Oil (Peppermint Oil) - Perfumes, Flavors and Plants patch panel # 5   22 Lavendula Angustifolia Oil (Lavender Oil) - Perfumes, Flavors and Plants patch panel # 10   Conclusion:     Intradermal Testing (Placed Jul 17, 2024)  No Substance Conc.  Reading (15min)  immediate Papule [mm] / Erythema [mm] Reading   (4 days)  delayed Papule [mm] / Erythema [mm] Remarks   DF Standard Dust Mite - D. Farinae 1:10 - -  10/10    DP Standard Dust Mite - D. Pteronyssinus 1:10 - -  10/10    A Aspergillus fumigatus  1:10 - -  -    P Penicillium notatum 1:10 - -  -     Alternaria alternata  1:10 - -  -     Cladosporium herbarum 1:10 - -  -     Cat Epithelium 1:10 - -  -    Conclusion: No signs for immediate-type reaction. Delayed type reaction to dust mites      PATCH TESTS - PERFUMES, FLAVORS & PLANTS  Applied 7/17/24       # Substance 2 days 4 days remarks   1 5 Mentha Piperita Oil (Peppermint Oil) - -    2 10 Lavendula Angustifolia Oil (Lavender Oil) - -    Neg after 4 days    Assessment & Plan:    ==> Final Diagnosis:     # Atopic predisposition with:  Perennial rhinitis and PND with delayed type reaction to house dust mites  RC in May and June  H/O atopic dermatitis in childhood  * chronic illness with exacerbation, progression, side effects from treatment    # Reaction to mint, eucalyptus, lavender, sometimes if cat nip  Immediate inhalant reaction  In history, delayed-type reaction with skin contact  * chronic illness with exacerbation, progression, side effects from  treatment    These conclusions are made at the best of one's knowledge and belief based on the provided evidence such as patient's history and allergy test results and they can change over time or can be incomplete because of missing information.    ==> Treatment Plan:    >> For IDT and patch test sites from today, patient will monitor sites for the next 2 and 4 days. If there are any delayed reactions, patient will take photos and report to clinic via MyChart for review.  - Will schedule a visit if any delayed-type reaction is experienced.    >> Epic allergy list updated.  - Removed dust mites, mint, pollen extract, and rag weed all with the same comment: No immediate-type reaction in skin tests. Not a drug allergy.     >> Referred to ENT with the following comment: recurrent, therapy-resistent Rhinopathy and postnasal drip. Prick and intradermal tests don't show an immediate type sensitization.    >> For rhinitis and PND: prescribed Nasonex (mometasone) nasal spray: [place] 2 sprays into both nostrils at bedtime  and do HOUSE DUST MITES reduction    Procedures Performed: Allergy tests, including prick and intradermal and patch tests     Staff Involved: Provider, Staff, Medical Student, and Scribe    Scribe Disclosure:   I, YO SANDHU, am serving as a scribe to document services personally performed by Rudy Figueroa MD based on data collection and the provider's statements to me.     Staff Physician Comments:  I was present with the scribe who participated in the documentation of the note. I have verified the history and personally performed the physical exam and medical decision making. I agree with the assessment and plan as documented in the note. I have reviewed and if necessary amended the note.      Also, I was present with the medical student who participated in the service and in the documentation of the note. I have verified the history and personally performed the physical exam and medical decision  making. I agree with the assessment and plan as documented in the note. I have reviewed and if necessary amended the note.    Rudy Figueroa MD  Professor  Head of Dermato-Allergy Division  Department of Dermatology  [facility]     Follow-up in Derm-Allergy clinic PRN, or sooner if any delayed reaction    I spent a total of 40 minutes with Cami Cardenas. This time was spent counseling the patient and/or coordinating care, explaining the allergy tests, performing allergy tests and assessing the clinical relevance.

## 2024-07-17 NOTE — NURSING NOTE
Chief Complaint   Patient presents with    Allergy Consult     Last 2 years primarily in May intensely itchy red eyes, stuffy/runny nose, sneezing, to be able to function in the past when really bad take Claritin, allegra, benadryl, xyxal, zyrtec but still a problem, current regimen when bad 2 claritin, 1 xyzal, flonase and benadryl. Normally 1 claritin and 2 benadry PRN, alternates antihistamines every 90 days d/t losing efficacy. When around mint products, dizziness/nausea/HA, gets rash when on skin, ingestion causes vomiting.      Robert Vega, RN

## 2024-07-17 NOTE — LETTER
7/17/2024      Cami Cardenas  [address]  [facility] [address]      Dear Colleague,    Thank you for referring your patient, Cami Cardenas, to the [facility] [place]. Please see a copy of my visit note below.    [facility] Dermato-allergology Note  Office visit  Encounter Date: Jul 17, 2024  ____________________________________________    CC: Allergy Consult (Last 2 years primarily in May intensely itchy red eyes, stuffy/runny nose, sneezing, to be able to function in the past when really bad take Claritin, allegra, benadryl, xyxal, zyrtec but still a problem, current regimen when bad 2 claritin, 1 xyzal, flonase and benadryl. Normally 1 claritin and 2 benadry PRN, alternates antihistamines every 90 days d/t losing efficacy. When around mint products, dizziness/nausea/HA, gets rash when on skin, ingestion causes vomiting. )      HPI:  (Jul 17, 2024)  Ms. Cami Cardenas is a(n) 29 year old female who presents today as new patient for allergy consultation  - Referred by Dr. Cristy Pleitez () for seasonal allergic rhinitis  - Reports worsening allergies in spring for the past 2 years with runny nose, itchy eyes, sneezing PND; no issues in fall  - Reports lifetime allergies previously (mostly with stuffy nose year-round) but they have not been as strong in the past  - Takes Claritin and 2 Benadryl as needed daily in off peak seasons  - When flared last year used Claritin, Zyrtec, Allegra, Xyzal daily, and benadryl as needed  - This year in the peak allergy months took 2 Claritin, 1 Xyzal, 2 sprays Flonase in AM and benadryl as needed  - Reactions to mint (spearmint, wintergreen, eucalyptus, fresh)  - If ingested, immediate vomiting if ingested  - If smelled, immediate headaches, dizziness, and nausea  - If skin comes in contact, develops rash a few hours later and lasts for a couple days  - Same reaction with lavender  - No issues with oregano or  basil  - Has 2 cats at home, has not had reactions to them; though same reaction as mint if cat snip is strong  - No issues around dogs  - Has not been evaluated by an ENT  - Otherwise feeling well in usual state of health    Physical Exam:  General: In no acute distress, well-developed, well-nourished  Eyes: no conjunctivitis  ENT: no signs of rhinitis   Pulmonary: no wheezing or coughing  Skin: Focused examination of the skin on test sites was performed = see test results below    Past Medical History:   Patient Active Problem List   Diagnosis     Hematoma of leg     Sprain and strain of hip and thigh     Leg pain     Morbid obesity (H)     Past Medical History:   Diagnosis Date     NO ACTIVE PROBLEMS      Allergies:  Allergies   Allergen Reactions     Dust Mites      Minthoxtachys      Allergic to all mints     Pollen Extract      Ragweeds      Medications:  Current Outpatient Medications   Medication Sig Dispense Refill     diphenhydrAMINE (BENADRYL) 25 MG tablet Take 1-2 tablets (25-50 mg) by mouth every 6 hours as needed for itching or allergies 30 tablet 1     diphenhydrAMINE HCl (ALLERGY MEDICINE PO) Take by mouth daily OTC allergy medicine       loratadine (CLARITIN) 10 MG tablet Take 10 mg by mouth daily       norethindrone (MICRONOR) 0.35 MG tablet Take 1 tablet (0.35 mg) by mouth daily 90 tablet 3     olopatadine (PATANOL) 0.1 % ophthalmic solution INSTILL 1 DROP IN BOTH EYES TWICE DAILY Strength: 0.1 % 5 mL 1     No current facility-administered medications for this visit.     Social History:  The patient works as a  with [employer] Patient has the following hobbies or non-occupational exposure: needle felting, gardening.    Family History:  Family History   Problem Relation Age of Onset     Arthritis Mother      Genetic Disorder Mother         Celiac Disease     High cholesterol Father      Hyperlipidemia Father      Diabetes Paternal Grandmother      Hypertension Paternal  Grandmother      Coronary Artery Disease Other         Paternal Great Grandfather     Hypertension Other         Maternal Uncle     Hyperlipidemia Other         Maternal Aunt     Cerebrovascular Disease Other         Paternal Great Grandmother     Other Cancer Other         Maternal Great Uncle; Kidneys     Mental Illness Other         Alzheimer's in various older Maternal relations     Obesity Other         Maternal Aunts (2)     Hypertension Maternal Grandmother      Hyperlipidemia Maternal Grandmother      Breast Cancer Maternal Grandmother      Colon Cancer Maternal Grandmother      Other Cancer Maternal Grandmother         Kidneys     Hypertension Maternal Grandfather      Hyperlipidemia Maternal Grandfather      Prostate Cancer Maternal Grandfather      Other Cancer Maternal Grandfather         Bone Cancer (C.O.D.)     Prostate Cancer Paternal Grandfather      Depression Paternal Grandfather      Anxiety Disorder Paternal Grandfather      Mental Illness Paternal Grandfather         Parkinson's and Dementia     Previous Labs, Allergy Tests, Dermatopathology, Imaging:  [Upon review of [facility] chart, no prior allergist or ENT records as of 7/17/24.]  FROM HPI:  Around memorial day her allergies got really bad to the point that she was on every OTC medicine at once to function. It hurt to have her eyes open. Itched to have them closed. Wondering what could be option for treatment.     FROM A&P:  Allergic conjunctivitis, bilateral  Seasonal allergies    patanol has been helpful for her intermittent symptoms.  She requests allergy referral today     Referred By: Cristy Pleitez MD (FP)  [address] [place] [address]     Allergy Tests:  Past Allergy Test - as of 7/17/24, no prior testing per patient.    Order for Future Allergy Testing:    [x] Outpatient  [] Inpatient: [facility]..../ Bed ....       Skin Atopy (atopic dermatitis) [x] Yes   [] No .........as a child, cleared by puberty   Contact  allergies:   [x] Yes   [] No ..........see HPI re: mint, lavender; no issues with metals or bandages/adhesives  Hand eczema:   [] Yes   [x] No           Leading hand:   [x] R   [] L       [] Ambidextrous         Drug allergies:        [] Yes   [x] No  which?......    Urticaria/Angioedema  [] Yes   [x] No .........  Food Allergy:  [x] Yes   [] No  which?......see HPI re: mint  Pets :  [] Yes   [x] No  which?......         [x]  Rhinitis   [x] Conjunctivitis   [] Sinusitis   [] Polyposis   [] Otitis   [] Pharyngitis         [x]  Postnasal drip    []  none  Operations:   [] Tonsils   [] Septum   [] Sinus   [] Polyposis        [] Asthma bronchiale   [] Coughing      [x]  none  Symptoms (mostly rhinoconjunctivitis and asthma) aggravated by:  Season   [] I   [] II   [] III   [] IV   [x]V   [x]VI   []VII   []VIII   []IX   []X   []XI   []XII     [x] perennial (worse in the spring months)  Day time      [] morning   [] noon      [] evening        [] night    [x] whole day........  []  none  Location/changes    [x] inside        [x] outside   [] mountains    [] sea     [] others.............   []  none  Triggers, specific     [] animals     [x] plants     [x] dust              [] others ...........................    []  None   Triggers, others       [x] work          [] psyche    [] sport            [] others .............................  []  none  Irritant                [] phys efforts [] smoke    [] heat/cold     [] odors  []others............... [x]  none    Order for PATCH TESTS  Reason for tests (suspected allergy): dermatitis  Known previous allergies: none  Standardized panels  [] Standard panel (40 tests)  [] Preservatives & Antimicrobials (31 tests)  [] Emulsifiers & Additives (25 tests)   [] Perfumes/Flavours & Plants (25 tests)  [] Hairdresser panel (12 tests)  [] Rubber Chemicals (22 tests)  [] Plastics (26 tests)  [] Colorants/Dyes/Food additives (20 tests)  [] Metals (implants/dental) (24 tests)  [] Local  anaesthetics/NSAIDs (13 tests)  [] Antibiotics & Antimycotics (14 tests)   [] Corticosteroids (15 tests)   [] Photopatch test (62 tests)   [] Others:       [] Patient's Own Products:   DO NOT test if chemical or biological identity is unknown!   always ask from patient the product information and safety sheets (MSDS)       Order for PRICK TESTS    Reason for tests (suspected allergy): perennial rhinitis and PND; RC in May and June; H/O atopic dermatitis in childhood  Known previous allergies: none    Standardized prick panels  [x] Atopic panel (20 tests)  [] Pediatric Panel (12 tests)  [] Milk, Meat, Eggs, Grains (20 tests)   [] Dust, Epithelia, Feathers (10 tests)  [] Fish, Seafood, Shellfish (17 tests)  [] Nuts, Beans (14 tests)  [] Spice, Vegetable, Fruit (17 tests)  [] Pollen Panel = Tree, Grass, Weed (24 tests)  [x] Others: lavender and mint from Fragrance Panel of patch tests      [] Patient's Own Products:   DO NOT test if chemical or biological identity is unknown!   always ask from patient the product information and safety sheets (MSDS)     Standardized intradermal tests    [x] Alternaria alternata  [x] Aspergillus fumigatus  [x] Cladosporium herbarum   [x] Penicillium notatum  [x] Dermatophagoides farinae  [x] Dermatophagoides pteronyssinus  [] Dog Epithelium  [x] Cat Epithelium  [] Others:     [] Bee venom   [] Wasp venom  !!Specific protocol with dilutions!!       Order for Drug allergy tests (prick & intradermal & patch tests)    [] Penicillin G  [] Ampicillin [] Cefazolin   [] Ceftriaxone   [] Ceftazidime  [] Bactrim    [] Others:   Order for ... as test date    Atopy Screen (Placed Jul 17, 2024)  No Substance Readings (15 min) Evaluation   POS Histamine 1mg/ml +/++    NEG NaCl 0.9% -      No Substance Readings (15 min) Evaluation   1 Alternaria alternata (tenuis)  -    2 Cladosporium herbarum -    3 Aspergillus fumigatus -    4 Penicillium notatum -    5 Dermatophagoides pteronyssinus -    6  Dermatophagoides farinae -    7 Dog epithelium (canis spp) -    8 Cat hair (jose catus) -    9 Cockroach   (Blatella americana & germanica) -    10 Grass mix midwest   (Leilani, Orchard, Redtop, Cr) -    11 Jose grass (sorghum halepense) -    12 Weed mix   (common Cocklebur, Lamb s quarters, rough redroot Pigweed, Dock/Sorrel) -    13 Mug wort (artemisia vulgare) -    14 Ragweed giant/short (ambrosia spp) -    15 White birch (Betula papyrifera) -    16 Tree mix 1 (Pecan, Maple BHR, Oak RVW, american Iaeger, black Howe) -    17 Red cedar (juniperus virginia) -    18 Tree mix 2   (white Zechariah, river/red Birch, black [place], common [place], american Elm) -    19 Box elder/Maple mix (acer spp) -    20 Eddy shagbark (carya ovata) -     21 Mentha Piperita Oil (Peppermint Oil) - Perfumes, Flavors and Plants patch panel # 5   22 Lavendula Angustifolia Oil (Lavender Oil) - Perfumes, Flavors and Plants patch panel # 10   Conclusion:     Intradermal Testing (Placed Jul 17, 2024)  No Substance Conc.  Reading (15min)  immediate Papule [mm] / Erythema [mm] Reading   (... days)  delayed Papule [mm] / Erythema [mm] Remarks   DF Standard Dust Mite - D. Farinae 1:10 - -  -    DP Standard Dust Mite - D. Pteronyssinus 1:10 - -  -    A Aspergillus fumigatus  1:10 - -  -    P Penicillium notatum 1:10 - -  -     Alternaria alternata  1:10 - -  -     Cladosporium herbarum 1:10 - -  -     Cat Epithelium 1:10 - -  -    Conclusion: No signs for immediate-type reaction. Patient will provide feedback if delayed-type reaction is experienced. .      PATCH TESTS - PERFUMES, FLAVORS & PLANTS  Applied 7/17/24       # Substance 2 days 4 days remarks   1 5 Mentha Piperita Oil (Peppermint Oil) - -    2 10 Lavendula Angustifolia Oil (Lavender Oil) - -        Assessment & Plan:    ==> Final Diagnosis:     # Atopic predisposition with:  Perennial rhinitis and PND  RC in May and June  H/O atopic dermatitis in childhood  * chronic illness with  exacerbation, progression, side effects from treatment    # Reaction to mint, eucalyptus, lavender, sometimes if cat nip  Immediate inhalant reaction  In history, delayed-type reaction with skin contact  * chronic illness with exacerbation, progression, side effects from treatment    These conclusions are made at the best of one's knowledge and belief based on the provided evidence such as patient's history and allergy test results and they can change over time or can be incomplete because of missing information.    ==> Treatment Plan:    >> For IDT and patch test sites from today, patient will monitor sites for the next 2 and 4 days. If there are any delayed reactions, patient will take photos and report to clinic via [employer] for review.  - Will schedule a visit if any delayed-type reaction is experienced.    >> Epic allergy list updated.  - Removed dust mites, mint, pollen extract, and rag weed all with the same comment: No immediate-type reaction in skin tests. Not a drug allergy.     >> Referred to ENT with the following comment: recurrent, therapy-resistent Rhinopathy and postnasal drip. Prick and intradermal tests don't show an immediate type sensitization.    >> For rhinitis and PND: prescribed Nasonex (mometasone) nasal spray: [place] 2 sprays into both nostrils at bedtime     Procedures Performed: Allergy tests, including prick and intradermal and patch tests     Staff Involved: Provider, Staff, Medical Student, and Scribe    Scribe Disclosure:   I, YO SANDHU, am serving as a scribe to document services personally performed by Rudy Figueroa MD based on data collection and the provider's statements to me.     Staff Physician Comments:  I was present with the scribe who participated in the documentation of the note. I have verified the history and personally performed the physical exam and medical decision making. I agree with the assessment and plan as documented in the note. I have reviewed and if  necessary amended the note.      Also, I was present with the medical student who participated in the service and in the documentation of the note. I have verified the history and personally performed the physical exam and medical decision making. I agree with the assessment and plan as documented in the note. I have reviewed and if necessary amended the note.    Rudy Figueroa MD  Professor  Head of Dermato-Allergy Division  Department of Dermatology  [facility]     Follow-up in Derm-Allergy clinic PRN, or sooner if any delayed reaction    I spent a total of 40 minutes with Cami Carednas. This time was spent counseling the patient and/or coordinating care, explaining the allergy tests, performing allergy tests and assessing the clinical relevance.       Again, thank you for allowing me to participate in the care of your patient.        Sincerely,        Rudy Figueroa MD

## 2024-07-17 NOTE — PATIENT INSTRUCTIONS
Removal of Patch Tests on Day 3:    Remove patches and tape from test area on Friday 7/19/24 at anytime          Using the purple surgical markers provided (or other permanent marker), draw a grid around the test area so that the circular indentation is in the center of each square, as below. Try to be as neat as possible and keep the lines as straight as you can (you can use a ruler if you need to)          Redraw the number that was underneath the tape above the grids, as shown below. Try to print clearly.            Photograph the entire area then take close-up photos of any possible reactions. Examples of possible reactions are spots that look like these: TAKE PHOTOS ON Friday AND Sunday, send them via [employer] Message to Dr. Figueroa's team          Return to clinic for evaluation as instructed/Send photos via [employer] message and team will reach out to you if we need to schedule a follow up. After removing on Friday, you should continue to avoid scrubbing the area, exposing the area to UV light, using topical, oral, or IV steroids, or scratching.     REMINDER - THE PURPLE MARKER WILL STAIN CLOTHING, BEDDING, FURNITURE, ETC. WEAR A DARK COLORED SHIRT OR SOMETHING THAT YOU DON'T CARE IF IT GETS STAINED UP, UNTIL YOU CAN WASH IT FULLY AFTER THE SECOND SET OF PHOTOS ARE DONE.        Who should I call with questions?  [facility], [place]: [phone]  For urgent needs outside of business hours call the [facility] at [phone] and ask for the dermatology resident on call      If you develop any serious or adverse allergic reaction after office hours please seek immediate medical assistance at the nearest clinic or emergency room       House Dust Mite Allergy        The house dust mite is an arachnid about 0.3 mm in size and not visible to the naked eye. There are around 150 species of house dust mites in the world. One mite produces up to 40 fecal droppings a day. One  teaspoonful of bedroom dust contains an average of nearly 1000 mites and 250,000 minute droppings.    Causes and triggers of house dust mite allergy  The house dust mite requires a warm, moist environment without light in order to live and reproduce. Our beds are ideal. The mite feeds on human and animal skin scales. The allergen is mainly contained in the mite's feces. The feces contain allergy-triggering constituents which are spread in fine dust, are breathed in and can cause an allergic reaction.    Symptoms  When the allergens come into contact with the mucous membranes in the eyes, nose, mouth and throat, sufferers develop symptoms typical of an allergic cold (allergic rhinitis) or an allergic inflammation of the conjunctiva (allergic conjunctivitis): blocked or runny nose, sneezing, red, itchy eyes. If all of these symptoms are present, then the condition is also known as rhinoconjunctivitis. Often, the upper respiratory tract becomes chronically inflamed, primarily because house dust mites are present all year round.  The symptoms of house dust mite allergy typically occur in the morning and are more frequent in the cold months of the year.    Therapy and treatment  As a first step, mattress, pillows and duvet/comforter should be placed in mite-proof or anti-mite covers, sometimes known as encasings. Alternatively you can use pillows or comforter that can be washed at over 130 F monthly. At the same time, house dust should be minimized. If necessary, the symptoms can be treated with medication, for example antihistamines in the form of nasal sprays, eye drops and tablets. Desensitization/specific immunotherapy (SIT) is recommended for house dust allergy if all the measures above are not sufficient.    Tips and tricks:  Keep room temperature at 66-70 F and relative air humidity at a maximum of 50%.  Ideally, thoroughly air your home two to three times a day for 5 to 10 minutes each time.  Wash bed linens in  "at least 130 F every week.  Remove stuffed animals or freeze them every other week.  Keep ceiling fans off in the bedroom as they can stir up dust mite allergens.  Remove dust from furniture with a damp cloth and regularly wet mop floors.  Do not put pot and hydroponic plants in the bedroom and also avoid putting too many in living areas, as they increase room humidity.  When staying overnight in other accommodations, we recommend taking your own bed linen and the above anti-mite mattress covers with you.  Remove upholstered furniture from the bedroom and consider removing the carpets. Ideally, use sealed parquet or laminate sanchez, cork tiles or sanchez made of wood, novilon or PVC.  Maybe additionally reduce dust mites in mattress, upholstery, or sanchez using hot steam .      Modified from \"House Dust Mite Allergy\" by [employer] [facility].    "

## 2024-08-05 ENCOUNTER — VIRTUAL VISIT (OUTPATIENT)
Dept: PSYCHOLOGY | Facility: CLINIC | Age: 29
End: 2024-08-05
Payer: COMMERCIAL

## 2024-08-05 DIAGNOSIS — F40.10 SOCIAL ANXIETY DISORDER: Primary | ICD-10-CM

## 2024-08-05 PROCEDURE — 90837 PSYTX W PT 60 MINUTES: CPT | Mod: 95 | Performed by: SOCIAL WORKER

## 2024-08-05 NOTE — PROGRESS NOTES
[facility]                                     Progress Note    Patient Name: Cami Cardenas  Date: 2024         Service Type: Individual      Session Start Time: 8:39 AM (Session started late due to technical issues)  Session End Time:  9:33  AM     Session Length: 53+ min (Session was 53+ minutes in length due to content of session, progress review, short-term goal setting and scheduling)  Session #: 13    Attendees: Client attended alone    Service Modality:  Video Visit:      Provider verified identity through the following two step process.  Patient provided:  Patient  and Patient is known previously to provider    Telemedicine Visit: The patient's condition can be safely assessed and treated via synchronous audio and visual telemedicine encounter.      Reason for Telemedicine Visit: Patient convenience (e.g. access to timely appointments / distance to available provider)    Originating Site (Patient Location): Patient's home    Distant Site (Provider Location): Provider Remote Setting- Home Office    Consent:  The patient/guardian has verbally consented to: the potential risks and benefits of telemedicine (video visit) versus in person care; bill my insurance or make self-payment for services provided; and responsibility for payment of non-covered services.     Patient would like the video invitation sent by:  My Chart    Mode of Communication:  Video Conference via [employer]    Distant Location (Provider):  On-site    As the provider I attest to compliance with applicable laws and regulations related to telemedicine.    DATA  Interactive Complexity: No  Crisis: No        Progress Since Last Session (Related to Symptoms / Goals / Homework):   Symptoms: Improving social anxiety symptoms.  The patient reports that she has noticed that she will get occasional surges of anxiety and is unsure what is triggering them.     Homework: Achieved / completed to  satisfaction      Episode of Care Goals: Satisfactory progress - ACTION (Actively working towards change); Intervened by reinforcing change plan / affirming steps taken     Current / Ongoing Stressors and Concerns:   Patient continues to report work-related stress and concerns about how her work place is being managed. The patient processed through internal experiences related to: her recent work experiences, her social Augustine, her recent interpersonal experiences, her interpersonal relationships, and dating. The patient reports that she is continuing to explore other employment opportunities     Treatment Objective(s) Addressed in This Session:   Continued to address:  Identify triggers/ fears / thoughts that contribute to feeling anxious  Patient will use at least 1 coping skill for anxiety management per week     Intervention:   Motivational Interviewing: co-developed short-term goals and reviewed progress in session  Psychodynamic: processed through internal experiences   Psycho-education    ACT   Validation and Normalization    Assessments completed prior to visit: None  The following assessments were completed by patient for this visit: None      ASSESSMENT: Current Emotional / Mental Status (status of significant symptoms):   Risk status (Self / Other harm or suicidal ideation)   Patient denies current fears or concerns for personal safety.   Patient denies current or recent suicidal ideation or behaviors.   Patient denies current or recent homicidal ideation or behaviors.   Patient denies current or recent self injurious behavior or ideation.   Patient denies other safety concerns.   Patient reports there has been no change in risk factors since their last session.     Patient reports there has been no change in protective factors since their last session.     Recommended that patient call 911 or go to the local ED should there be a change in any of these risk factors.     Appearance:   Appropriate    Eye  Contact:   Good    Psychomotor Behavior: Normal    Attitude:   Cooperative  Interested Friendly Pleasant Attentive   Orientation:   All   Speech    Rate / Production: Normal/ Responsive    Volume:  Normal    Mood:    Anxious  Normal Frustrated (with identified stressor/concerns   Affect:    Appropriate    Thought Content:  Clear    Thought Form:  Coherent  Goal Directed  Logical    Insight:    Good      Medication Review:   No current psychiatric medications prescribed     Medication Compliance:   No     Changes in Health Issues:   None reported     Chemical Use Review:   Substance Use: Chemical use reviewed, no active concerns identified      Tobacco Use: No current tobacco use.      Diagnosis:  1. Social anxiety disorder          Collateral Reports Completed:   Not Applicable    PLAN: (Patient Tasks / Therapist Tasks / Other)  Patient plans to track her anxiety symptoms to see if she can identify any correlations or triggers for her anxiety surges.   Patient plans to attend a swing dancing event.   Patient continue to practice not letting things build up/releasing stress and frustration at the end of her work day or throughout the day (breathing techniques - breath out stress and frustration, shower-let the water wash away the stress, physical activity - go for a walk, or vent to a friend)   Patient will return for follow up: 08/19/2024  Update treatment plan next session  Continue to meet with patient every other week.       Ro Harp, [facility]                                                         ______________________________________________________________________    Individual Treatment Plan    Patient's Name: Cami Cardenas  YOB: 1995    Date of Creation: February 5, 2024    Date Treatment Plan Last Reviewed/Revised: May 15, 2024        DSM5 Diagnoses: 300.23 (F40.10) Social Anxiety Disorder  Psychosocial / Contextual Factors:  Patient reports work related stress.    PROMIS  (reviewed every 90 days): 31    Referral / Collaboration:  Referral to another professional/service is not indicated at this time..    Anticipated number of session for this episode of care: 9-12 sessions  Anticipation frequency of session: Every other week  Anticipated Duration of each session: 38-52 minutes  Treatment plan will be reviewed in 90 days or when goals have been changed.       MeasurableTreatment Goal(s) related to diagnosis / functional impairment(s)  Goal 1: Reduce severity of anxiety symptoms from Moderate to Minimal using the FLORENTIN-7 for measurement.     I will know I've met my goal when I won't knowingly feel anxious on a daily basis.      Objective #A (Patient Action)    Patient will identify triggers/ fears / thoughts that contribute to feeling anxious.  Status: New - Date: 2/05/2024  Continued dates: 5/13/2024    Intervention(s)  Psychodynamic   CBT   Internal Family Systems (IFS)    Objective #B  Patient will use at least 1 coping skills for anxiety management per week.  Status: New - Date: 2/05/2024  Continued dates: 5/13/2024    Intervention(s)  Therapist will teach emotional regulation skills. Such as: mindfulness, breathing techniques, meditation, visualization  .  ACT (ACT)   Cognitive Behavioral Therapy (CBT)   Internal Family Systems (IFS)    Objective #C  Patient will get 7 or more hours of sleep per night.  Status: New - Date: 2/05/2024  Continued dates: 5/13/2024    Intervention(s)  Psycho-education  Motivational Interviewing      Patient has reviewed and agreed to the above plan.      Ro Harp, [facility]  May 13, 2024

## 2024-08-12 ENCOUNTER — MYC MEDICAL ADVICE (OUTPATIENT)
Dept: FAMILY MEDICINE | Facility: CLINIC | Age: 29
End: 2024-08-12
Payer: COMMERCIAL

## 2024-08-14 NOTE — TELEPHONE ENCOUNTER
Dr Pleitez,    Pt got cramping for a couple weeks then bleeding, not just spotting, started yesterday. ON Micronor . Did miss one dose recently but back to daily.    Just watchful waiting?    What do you advise?  Shonna Jordan RN

## 2024-08-14 NOTE — TELEPHONE ENCOUNTER
[employer] message sent to patient.  Sindhu MANNING BSN, PHN, RN  [facility]  [phone]

## 2024-08-14 NOTE — TELEPHONE ENCOUNTER
"She can take a 7 day break from the pill to allow a \"period\" and then restart the micronor. If not improving, or if worsening bleeding or cramping I would recommend calling triage for possible routing to gynecology vs UC/ADS/ER. Cristy Pleitez M.D.      "

## 2024-09-11 ENCOUNTER — MYC MEDICAL ADVICE (OUTPATIENT)
Dept: FAMILY MEDICINE | Facility: CLINIC | Age: 29
End: 2024-09-11
Payer: COMMERCIAL

## 2024-09-12 NOTE — TELEPHONE ENCOUNTER
"Dr. Pleitez: virtual visit to discuss alternatives?     You had me pause the pill for a week last time: about jail through that pause week, symptoms were gone, so I started back up at the end of that week.      Since then I've only missed 2 pills (I forgot to put them in my bag when I went to work) and took them as soon as I got home those days (about 4 hours later than I would normally take them); both of these were a couple of weeks ago.     Yesterday (about a month after last time), I woke up to find that I had started bleeding overnight. Unlike last time, the cramps started about an hour after I woke up, and as of this morning, I have fairly intense cramps and full period flow.     Any thoughts on why the minipill seems to be not working the same as it has been? with last time I at least had the \"warning\" of the cramps, but yesterday was out of the blue.    Sindhu MANNING BSN, PHN, RN  [facility]  [phone]    "

## 2024-09-13 NOTE — TELEPHONE ENCOUNTER
"FUTURE VISIT INFORMATION      FUTURE VISIT INFORMATION:  Date: 10/24/24  Time: 8 AM  Location: [facility] - ENT  REFERRAL INFORMATION:  Referring provider:  Rudy Figueroa MD   Referring providers clinic:  [facility]   Reason for visit/diagnosis:  per pt, refd Dr Figueroa, Seasonal and perennial allergic rhinoconjunctivitis ,Allergic rhinitis with postnasal drip, [facility] confd     RECORDS REQUESTED FROM      Clinic name Comments Records Status Imaging Status   [facility]  7/17/24 note/ referral- Rudy Figueroa MD  E[facility]            \"Please notify/message CSS if patient completed outside imaging prior to scheduled appointment and/or any outside records that might have been missed at pre visit -Thanks\"  "

## 2024-09-16 NOTE — TELEPHONE ENCOUNTER
I recommend further discussion/evaluation. I think she had previously been seen by Dr. Son. I recommend scheduling with [place] [facility] to discuss further. She should not need a referral, but please provide scheduling number. Cristy Pleitez M.D.

## 2024-09-17 ENCOUNTER — IMMUNIZATION (OUTPATIENT)
Dept: FAMILY MEDICINE | Facility: CLINIC | Age: 29
End: 2024-09-17
Payer: COMMERCIAL

## 2024-09-17 DIAGNOSIS — Z23 ENCOUNTER FOR IMMUNIZATION: Primary | ICD-10-CM

## 2024-09-17 PROCEDURE — 90471 IMMUNIZATION ADMIN: CPT

## 2024-09-17 PROCEDURE — 99207 PR NO CHARGE NURSE ONLY: CPT

## 2024-09-17 PROCEDURE — 90656 IIV3 VACC NO PRSV 0.5 ML IM: CPT

## 2024-09-17 NOTE — PROGRESS NOTES
Prior to immunization administration, verified patients identity using patient s name and date of birth. Please see Immunization Activity for additional information.     Is the patient's temperature normal (100.5 or less)? Yes     Patient MEETS CRITERIA. PROCEED with vaccine administration.          9/17/2024   INFLUENZA   Would you like to receive the flu shot or the nasal flu vaccine today? Flu Shot   Have you had a serious reaction to a flu vaccine or something in a flu vaccine? No   Have you had Guillain-[place] syndrome within 6 weeks of getting a vaccine? No   Have you received a bone marrow transplant within the previous 6 months? No            Patient MEETS CRITERIA. PROCEED with vaccine administration.        Patient instructed to remain in clinic for 15 minutes afterwards, and to report any adverse reactions.      Link to Ancillary Visit Immunization Standing Orders SmartSet     Screening performed by Yamile Reynolds on 9/17/2024 at 8:46 AM.

## 2024-09-30 ENCOUNTER — OFFICE VISIT (OUTPATIENT)
Dept: OBGYN | Facility: CLINIC | Age: 29
End: 2024-09-30
Payer: COMMERCIAL

## 2024-09-30 VITALS
BODY MASS INDEX: 41.02 KG/M2 | HEIGHT: 71 IN | WEIGHT: 293 LBS | SYSTOLIC BLOOD PRESSURE: 128 MMHG | DIASTOLIC BLOOD PRESSURE: 76 MMHG

## 2024-09-30 DIAGNOSIS — Z30.013 ENCOUNTER FOR INITIAL PRESCRIPTION OF INJECTABLE CONTRACEPTIVE: Primary | ICD-10-CM

## 2024-09-30 LAB — HCG UR QL: NEGATIVE

## 2024-09-30 PROCEDURE — 96372 THER/PROPH/DIAG INJ SC/IM: CPT

## 2024-09-30 PROCEDURE — 99203 OFFICE O/P NEW LOW 30 MIN: CPT | Mod: 25

## 2024-09-30 PROCEDURE — 81025 URINE PREGNANCY TEST: CPT

## 2024-09-30 RX ORDER — MEDROXYPROGESTERONE ACETATE 150 MG/ML
150 INJECTION, SUSPENSION INTRAMUSCULAR
Status: ACTIVE | OUTPATIENT
Start: 2024-09-30 | End: 2025-09-25

## 2024-09-30 RX ADMIN — MEDROXYPROGESTERONE ACETATE 150 MG: 150 INJECTION, SUSPENSION INTRAMUSCULAR at 11:43

## 2024-09-30 NOTE — PROGRESS NOTES
SUBJECTIVE:                                                   Cami Cardenas is a 29 year old female who presents to clinic today for the following health issue(s):  Patient presents with:  Consult: Questions about current birth control, has had 3 periods in the last 2 months, with current OCP is not suppose to have one, takes continuous OCP      HPI:  Cami here today to discuss her BC method. She was previously on RACHELLE, caused insertional pain. She then changed to a POP Jan 2024, this resolved the insertional pain. Now has had irregular bleeding. 8/12/24 started a period that lasted 6 days, another period with heavy cramping 9/11, also bled again 9/23- 9/26. She is hoping to start a new option that does not have as much break through bleeding and will not cause insertional pain.     No LMP recorded. (Menstrual status: Birth Control)..     Patient is not sexually active, No obstetric history on file..  Using oral contraceptives for contraception.    reports that she has never smoked. She has never been exposed to tobacco smoke. She has never used smokeless tobacco.    STD testing offered?  Declined - not sexually active    Health maintenance updated:  yes    Overdue          SEP 1  2024 COVID-19 Vaccine (5 - 2024-25 season)  Last completed: Nov 24, 2023         Due Soon          NOV 10  2024 PAP (Every 3 Years)  Last completed: Nov 10, 2021       Today's PHQ-2 Score:       8/19/2024     7:24 AM   PHQ-2 ( 1999 Pfizer)   Q1: Little interest or pleasure in doing things 0   Q2: Feeling down, depressed or hopeless 0   PHQ-2 Score 0   Q1: Little interest or pleasure in doing things Not at all   Q2: Feeling down, depressed or hopeless Not at all   PHQ-2 Score 0     Today's PHQ-9 Score:       1/10/2024    10:35 AM   PHQ-9 SCORE   PHQ-9 Total Score MyChart 0   PHQ-9 Total Score 0     Today's FLORENTIN-7 Score:        No data to display                Problem list and histories reviewed & adjusted, as indicated.  Additional  history: as documented.    Patient Active Problem List   Diagnosis    Hematoma of leg    Sprain and strain of hip and thigh    Leg pain    Morbid obesity (H)     Past Surgical History:   Procedure Laterality Date    DENTAL SURGERY      dental implant      Social History     Tobacco Use    Smoking status: Never     Passive exposure: Never    Smokeless tobacco: Never   Substance Use Topics    Alcohol use: No      Problem (# of Occurrences) Relation (Name,Age of Onset)    Anxiety Disorder (1) Paternal Grandfather (Grandpa)    Mental Illness (2) Other (Great Aunt): Alzheimer's in various older Maternal relations, Paternal Grandfather (Grandpa): Parkinson's and Dementia    Arthritis (1) Mother (Mom)    Depression (1) Paternal Grandfather (Grandpa)    Diabetes (1) Paternal Grandmother (Paternal Grandmother)    Genetic Disorder (1) Mother (Mom): Celiac Disease    Hypertension (4) Paternal Grandmother (Paternal Grandmother), Other (Great Aunt): Maternal Uncle, Maternal Grandmother (Grandma), Maternal Grandfather (Grandpa)    Cerebrovascular Disease (1) Other (Great Aunt): Paternal Great Grandmother    Obesity (1) Other (Great Aunt): Maternal Aunts (2)    Breast Cancer (1) Maternal Grandmother (Grandma)    Prostate Cancer (2) Maternal Grandfather (Grandpa), Paternal Grandfather (Grandpa)    High cholesterol (1) Father (Father)    Other Cancer (3) Other (Great Aunt): Maternal Great Uncle; Kidneys, Maternal Grandmother (Grandma): Kidneys, Maternal Grandfather (Grandpa): Bone Cancer (C.O.D.)    Hyperlipidemia (4) Father (Father), Other (Great Aunt): Maternal Aunt, Maternal Grandmother (Grandma), Maternal Grandfather (Grandpa)    Coronary Artery Disease (1) Other (Great Aunt): Paternal Great Grandfather    Colon Cancer (1) Maternal Grandmother (Grandma)              Current Outpatient Medications   Medication Sig Dispense Refill    diphenhydrAMINE (BENADRYL) 25 MG tablet Take 1-2 tablets (25-50 mg) by mouth every 6 hours as  "needed for itching or allergies 30 tablet 1    mometasone (NASONEX) 50 MCG/ACT nasal spray Spray 2 sprays into both nostrils at bedtime 17 g 3    norethindrone (MICRONOR) 0.35 MG tablet Take 1 tablet (0.35 mg) by mouth daily 90 tablet 3    olopatadine (PATANOL) 0.1 % ophthalmic solution INSTILL 1 DROP IN BOTH EYES TWICE DAILY Strength: 0.1 % 5 mL 1    diphenhydrAMINE HCl (ALLERGY MEDICINE PO) Take by mouth daily OTC allergy medicine      loratadine (CLARITIN) 10 MG tablet Take 10 mg by mouth daily       Current Facility-Administered Medications   Medication Dose Route Frequency Provider Last Rate Last Admin    medroxyPROGESTERone (DEPO-PROVERA) injection 150 mg  150 mg Intramuscular Q90 Days          No Known Allergies    OBJECTIVE:     /76   Ht 1.812 m (5' 11.34\")   Wt 133.2 kg (293 lb 9.6 oz)   BMI 40.56 kg/m    Body mass index is 40.56 kg/m .    Exam:  Constitutional:  Appearance: Well nourished, well developed alert, in no acute distress  Psychiatric:  Mentation appears normal and affect normal/bright.     In-Clinic Test Results:  No results found for this or any previous visit (from the past 24 hour(s)).    ASSESSMENT/PLAN:                                                        ICD-10-CM    1. Encounter for initial prescription of injectable contraceptive  Z30.013 HCG qualitative urine     medroxyPROGESTERone (DEPO-PROVERA) injection 150 mg     HCG qualitative urine          I reviewed with the patient options for contraception today.  We discussed the birth control pill, the patch, the NuvaRing, Depo-Provera, Nexplanon, and the Paraguard, Mirena, Joann, and Kyleena IUDs.  We reviewed the risks, benefits, side effects, failure rates, and potential complications of each of these options for birth control.   We reviewed the insertion process of the Nexplanon and IUDs.  We reviewed the effectiveness of each of the options.  We discussed that none of these protects against STI's. All questions and concerns " addressed.   After our discussion patient has decided on depo injection. Discussed subQ is an option. Today we will start with in office depo, may change in future to SubQ at home version.     -need UPT prior to injection today  -not currently sexually active. No UPIC in the past 2 weeks.      Corinne Garrett PA-C  [facility]

## 2024-09-30 NOTE — PROGRESS NOTES

## 2024-10-07 ENCOUNTER — IMMUNIZATION (OUTPATIENT)
Dept: FAMILY MEDICINE | Facility: CLINIC | Age: 29
End: 2024-10-07
Payer: COMMERCIAL

## 2024-10-07 DIAGNOSIS — Z23 ENCOUNTER FOR IMMUNIZATION: Primary | ICD-10-CM

## 2024-10-07 PROCEDURE — 99207 PR NO CHARGE NURSE ONLY: CPT

## 2024-10-07 PROCEDURE — 90480 ADMN SARSCOV2 VAC 1/ONLY CMP: CPT

## 2024-10-07 PROCEDURE — 91320 SARSCV2 VAC 30MCG TRS-SUC IM: CPT

## 2024-10-07 NOTE — PROGRESS NOTES
Prior to immunization administration, verified patients identity using patient s name and date of birth. Please see Immunization Activity for additional information.     Is the patient's temperature normal (100.5 or less)? Yes     Patient MEETS CRITERIA. PROCEED with vaccine administration.          10/7/2024   COVID   Have you had myocarditis or pericarditis (inflammation of or around the heart muscle) after getting a COVID-19 vaccine? No   Have you had a serious reaction to a COVID vaccine or something in a COVID vaccine, like polyethylene glycol (PEG) or polysorbate? No   Have you had multisystem inflammatory syndrome from COVID-19 in the past 90 days? No            Patient MEETS CRITERIA. PROCEED with vaccine administration.    Patient instructed to remain in clinic for 15 minutes afterwards, and to report any adverse reactions.      Link to Ancillary Visit Immunization Standing Orders SmartSet     Screening performed by Yamile Reynolds on 10/7/2024 at 9:26 AM.

## 2024-10-24 ENCOUNTER — OFFICE VISIT (OUTPATIENT)
Dept: OTOLARYNGOLOGY | Facility: CLINIC | Age: 29
End: 2024-10-24
Payer: COMMERCIAL

## 2024-10-24 ENCOUNTER — PRE VISIT (OUTPATIENT)
Dept: OTOLARYNGOLOGY | Facility: CLINIC | Age: 29
End: 2024-10-24

## 2024-10-24 VITALS
BODY MASS INDEX: 41.02 KG/M2 | OXYGEN SATURATION: 97 % | HEIGHT: 71 IN | WEIGHT: 293 LBS | SYSTOLIC BLOOD PRESSURE: 125 MMHG | HEART RATE: 83 BPM | DIASTOLIC BLOOD PRESSURE: 80 MMHG

## 2024-10-24 DIAGNOSIS — J30.2 SEASONAL AND PERENNIAL ALLERGIC RHINOCONJUNCTIVITIS: ICD-10-CM

## 2024-10-24 DIAGNOSIS — R09.82 ALLERGIC RHINITIS WITH POSTNASAL DRIP: ICD-10-CM

## 2024-10-24 DIAGNOSIS — H10.10 SEASONAL AND PERENNIAL ALLERGIC RHINOCONJUNCTIVITIS: ICD-10-CM

## 2024-10-24 DIAGNOSIS — J30.89 SEASONAL AND PERENNIAL ALLERGIC RHINOCONJUNCTIVITIS: ICD-10-CM

## 2024-10-24 DIAGNOSIS — J30.9 ALLERGIC RHINITIS WITH POSTNASAL DRIP: ICD-10-CM

## 2024-10-24 PROCEDURE — 99243 OFF/OP CNSLTJ NEW/EST LOW 30: CPT | Mod: 25 | Performed by: STUDENT IN AN ORGANIZED HEALTH CARE EDUCATION/TRAINING PROGRAM

## 2024-10-24 PROCEDURE — 31231 NASAL ENDOSCOPY DX: CPT | Performed by: STUDENT IN AN ORGANIZED HEALTH CARE EDUCATION/TRAINING PROGRAM

## 2024-10-24 ASSESSMENT — PAIN SCALES - GENERAL: PAINLEVEL_OUTOF10: NO PAIN (0)

## 2024-10-24 NOTE — NURSING NOTE
"Chief Complaint   Patient presents with    Consult   Blood pressure 125/80, pulse 83, height 1.812 m (5' 11.34\"), weight 133 kg (293 lb 4.8 oz), SpO2 97%, not currently breastfeeding. Matt Garrett, EMT    "

## 2024-10-24 NOTE — PATIENT INSTRUCTIONS
You were seen in the ENT Clinic today by Dr. Gonzales. If you have any questions or concerns after your appointment, please contact us (see below)       2.   The following recommendations have been made based upon your appointment today:   -Astepro (azelastine): 2 sprays in each nostril once daily with Nasonex.   -Sinus rinses.    3.   Plan to return to the ENT clinic as needed.           How to Contact Us:  Send a [email or web address] message to your provider. Our team will respond to you via [email or web address]. Occasionally, we will need to call you to get further information.  For urgent matters (Monday-Friday), call the ENT Clinic: [phone] and speak with a call center team member - they will route your call appropriately.   If you'd like to speak directly with a nurse, please find our contact information below. We do our best to check voicemail frequently throughout the day, and will work to call you back within 1-2 days. For urgent matters, please use the general clinic phone numbers listed above.     Flory ALVAREZ RN  Direct: [phone]  Danielle SCHMITZ LPN  Direct: [phone]         [facility]  Department of Otolaryngology

## 2024-10-24 NOTE — PROGRESS NOTES
[facility]  New Patient Visit      Encounter date:   October 24, 2024    Referring Provider:   Rudy Figueroa MD  [address] [place] [address]    Reason for Visit: New Patient      History of Present Illness:      Cami Cardenas is a 29 year old female who presents for consultation regarding chronic rhinitis. Over the last 2 years, primarily in late May, she has intensely itchy red eyes, stuffy/runny nose, and sneezing. especially during high pollen days. When her symptoms get very bad, she takes a regimen of allergy medication to function- Claritin, allegra, benadryl, xyxal, zyrtec. She normally takes 1 claritin and 2 benadry PRN, and alternates antihistamines every 90 days d/t losing efficacy. Claritin seems to work the best. She has used Neti pot saline rinses in the past which clears the stuffiness, but triggers her gag reflex. She reports mint products cause dizziness/nausea/HA, rash when on skin, ingestion causes vomiting.     Her sister mentions that she snores softly at night.  Denies history of trauma to the nose    Number of episodes per year: chronic  Facial pain/pressure: no  Nasal drainage: yes  Nasal obstruction: yes  Changes in smell or taste: n/a  Epistaxis: no    History of asthma/allergy: yes    Prior medication trials: Claritin, allegra, benadryl, xyxal, zyrtec, neti pot saline rinse  Prior sinus surgery: no  Prior allergy testing with positives: pollen, dustmites  Prior CT scans: no    Other otolaryngic complaints: no    Sino-Nasal Outcome Test (SNOT - 22)  1. Need to Blow Nose: (Patient-Rptd) (P) Moderate  2. Nasal Blockage: (Patient-Rptd) (P) Mild or slight  3. Sneezing: (Patient-Rptd) (P) Mild or slight  4. Runny Nose: (Patient-Rptd) (P) Moderate  5. Cough: (Patient-Rptd) (P) None  6. Post-nasal discharge: (Patient-Rptd) (P) Moderate  7. Thick nasal discharge: (Patient-Rptd) (P) None, Very mild  8. Ear fullness: (Patient-Rptd) (P) None  9. Dizziness:  (Patient-Rptd) (P) None  10. Ear Pain: (Patient-Rptd) (P) None  11. Facial pain/pressure: (Patient-Rptd) (P) Very mild  12. Decreased Sense of Smell/Taste: (Patient-Rptd) (P) None  13. Difficulty falling asleep: (Patient-Rptd) (P) Mild or slight  14. Wake up at night: (Patient-Rptd) (P) Mild or slight  15. Lack of a good night's sleep: (Patient-Rptd) (P) Mild or slight  16. Wake up tired: (Patient-Rptd) (P) Mild or slight  17. Fatigue: (Patient-Rptd) (P) Very mild  18. Reduced Productivity: (Patient-Rptd) (P) None  19. Reduced Concentration: (Patient-Rptd) (P) None  20. Frustrated/restless/irritable: (Patient-Rptd) (P) None  21. Sad: (Patient-Rptd) (P) None  22. Embarrassed: (Patient-Rptd) (P) None  Total Score: (P) 23       Review of Systems:  A comprehensive 14-point review of systems was performed with positives and pertinent negatives listed in the HPI.    Past Medical/Surgical History:  Reviewed today with the patient. No history of major medical comorbidities. Does not take any blood thinners. No prior history of sinonasal surgery or trauma.    Allergies:     No Known Allergies    Medical History:  Past Medical History:   Diagnosis Date    NO ACTIVE PROBLEMS         Surgical History:   Past Surgical History:   Procedure Laterality Date    DENTAL SURGERY      dental implant        Family History:  Family History   Problem Relation Age of Onset    Arthritis Mother     Genetic Disorder Mother         Celiac Disease    High cholesterol Father     Hyperlipidemia Father     Diabetes Paternal Grandmother     Hypertension Paternal Grandmother     Coronary Artery Disease Other         Paternal Great Grandfather    Hypertension Other         Maternal Uncle    Hyperlipidemia Other         Maternal Aunt    Cerebrovascular Disease Other         Paternal Great Grandmother    Other Cancer Other         Maternal Great Uncle; Kidneys    Mental Illness Other         Alzheimer's in various older Maternal relations    Obesity  Other         Maternal Aunts (2)    Hypertension Maternal Grandmother     Hyperlipidemia Maternal Grandmother     Breast Cancer Maternal Grandmother     Colon Cancer Maternal Grandmother     Other Cancer Maternal Grandmother         Kidneys    Hypertension Maternal Grandfather     Hyperlipidemia Maternal Grandfather     Prostate Cancer Maternal Grandfather     Other Cancer Maternal Grandfather         Bone Cancer (C.O.D.)    Prostate Cancer Paternal Grandfather     Depression Paternal Grandfather     Anxiety Disorder Paternal Grandfather     Mental Illness Paternal Grandfather         Parkinson's and Dementia        Social History:   Social History     Socioeconomic History    Marital status: Single   Tobacco Use    Smoking status: Never     Passive exposure: Never    Smokeless tobacco: Never   Vaping Use    Vaping status: Never Used   Substance and Sexual Activity    Alcohol use: No    Drug use: No    Sexual activity: Never   Other Topics Concern    Parent/sibling w/ CABG, MI or angioplasty before 65F 55M? No     Social Drivers of Health     Financial Resource Strain: Low Risk  (12/31/2023)    Financial Resource Strain     Within the past 12 months, have you or your family members you live with been unable to get utilities (heat, electricity) when it was really needed?: No   Food Insecurity: Low Risk  (12/31/2023)    Food Insecurity     Within the past 12 months, did you worry that your food would run out before you got money to buy more?: No     Within the past 12 months, did the food you bought just not last and you didn t have money to get more?: No   Transportation Needs: Low Risk  (12/31/2023)    Transportation Needs     Within the past 12 months, has lack of transportation kept you from medical appointments, getting your medicines, non-medical meetings or appointments, work, or from getting things that you need?: No   Physical Activity: Insufficiently Active (11/10/2021)    Exercise Vital Sign     Days of  "Exercise per Week: 2 days     Minutes of Exercise per Session: 30 min   Stress: No Stress Concern Present (11/10/2021)    [employer] - Occupational Stress Questionnaire     Feeling of Stress : Only a little   Social Connections: Socially Isolated (11/10/2021)    Social Connection and Isolation Panel [NHANES]     Frequency of Communication with Friends and Family: Once a week     Frequency of Social Gatherings with Friends and Family: Once a week     Attends Church Services: Never     Active Member of Clubs or Organizations: No     Marital Status: Never    Interpersonal Safety: Low Risk  (1/2/2024)    Interpersonal Safety     Do you feel physically and emotionally safe where you currently live?: Yes     Within the past 12 months, have you been hit, slapped, kicked or otherwise physically hurt by someone?: No     Within the past 12 months, have you been humiliated or emotionally abused in other ways by your partner or ex-partner?: No   Housing Stability: Low Risk  (12/31/2023)    Housing Stability     Do you have housing? : Yes     Are you worried about losing your housing?: No        Family History:  Reviewed with patient. No pertinent positives.    Physical Examination:  Vital signs:      BP: 125/80 Pulse: 83     SpO2: 97 %     Height: 5' 11.34\" (181.2 cm) Weight: 293 lb 4.8 oz (133 kg)  Estimated body mass index is 40.52 kg/m  as calculated from the following:    Height as of this encounter: 5' 11.34\" (1.812 m).    Weight as of this encounter: 293 lb 4.8 oz (133 kg).    Constitutional/Psychiatric: This is a well-appearing, well-dressed patient in no acute distress. female communicates easily with no obvious speech issues. Oriented to self and environment with appropriate conversation. Does not appear depressed, anxious, or agitated.  Head: Normocephalic. Overall inspection reveals no prior scars, lesions, or masses. Facial motion is symmetric. No sinus tenderness.  Eyes: " Extra-ocular motions are intact with symmetric gaze. Conjunctiva clear. No eyelid lesions. Pupils round, symmetric, and reactive to light.  Ears: Auricles without masses or lesions bilaterally. External auditory canals clear with minimal non-obstructive cerumen. Tympanic membranes intact without middle ear fluid or retraction. Hearing intact grossly at conversational volume.  Oral Cavity/Oropharynx: Lips, gingiva and teeth appear healthy.   Neck/Lymphatic: Flat, symmetric without detectable lymphadenopathy. Respiratory: No increased work of breathing or respiratory distress. No audible stridor or stertor.  Peripheral/Cardiovascular: Brisk capillary refill bilaterally.   Neurologic: Cranial nerves II-XII grossly intact as tested.    Nasal examination: No lesions, masses, or scars of the external nose are visualized. I do not appreciate any external deviation of the bony nasal vault. External valves patent without inspiratory alar collapse. Columella is midline.      Procedure Note: Diagnostic Nasal Endoscopy, CPT 00600  Date of Service: October 24, 2024  Provider: Deyvi Gonzales MD   Presumptive Diagnosis: No primary diagnosis found.  Anesthesia: Topical lidocaine and oxymetazoline via atomizer    Indication:  Evaluation of nasal/sinus complaints; inadequate visualization with anterior rhinoscopy    Description of procedure:  After obtaining consent for the procedure from the patient, the sinonasal cavity was sprayed with topical anesthetic. A rigid 30-degree nasal endoscope was used to first used to visualize the nasal floor and the nasopharynx on the left. A second pass was then made to visualize the middle meatus and sphenoethmoid recess. Finally, the scope was turned 90-degrees and used to visualize the olfactory cleft and frontal outflow tract. A similar approach was used for the contralateral side. She tolerated the procedure well without difficulty.    Endoscopic Findings:    Runge-Sandro Endoscopic Scoring  System  Endoscopic observation Right Left   Polyps in middle meatus (0 = absent, 1 = restricted to middle meatus, 2 = Beyond middle meatus) 0 0   Discharge (0 = absent, 1 = thin and clear, 2 = thick, purulent) 0 0   Edema (0 = absent, 1 = mild-moderate, 2 = moderate-severe) 1 1   Crusting (0 = absent, 1 = mild-moderate, 2 = moderate-severe) 0 0   Scarring (0= absent, 1 = mild-moderate, 2 = moderate-severe) 0 0   Total 1 1     LT/RT: Bilateral sinonasal passages patent without evidence of infection, polyps, or mass.     Evidence of underlying sinonasal edema most consistent with allergic rhinitis.    Final Assessment/Plan: No evidence of nasal polyps/septal deviation/anatomy that would increase risk of underlying symptoms.     Continue Antihistamine once daily  Start Azlastine nasal spray once a day in tandem with nasonex nasal spray. Azlastine samples provided.  Start Nasal Rinse - Recommended using Navage machine to bypass gag reflex. Recommended Simply Saline for aerosolized option if rinses do not work.  Discussed option to use Singulair or Ryaltris if above medications do not improve  Follow up with me as needed.      Scribe Disclosure:   I, JUANIS APONTE, am serving as a scribe; to document services personally performed by Deyvi Gonzales MD -based on data collection and the provider's statements to me.     Provider Disclosure:  I agree with above History, Review of Systems, Physical exam and Plan.  I have reviewed the content of the documentation and have edited it as needed. I have personally performed the services documented here and the documentation accurately represents those services and the decisions I have made.      Electronically signed:  Deyvi Gonzales MD    [facility]  Rhinology  Endoscopic Skull Base Surgery  [facility]  Department of Otolaryngology - Head & Neck Surgery

## 2024-10-24 NOTE — LETTER
10/24/2024       RE: Cami Cardenas  [address]  [place] [address]     Dear Colleague,    Thank you for referring your patient, Cami Cardenas, to the [facility] [place] at [facility]. Please see a copy of my visit note below.      [facility]  New Patient Visit      Encounter date:   October 24, 2024    Referring Provider:   Rudy Figueroa MD  [address] [place] [id number]    Reason for Visit: New Patient      History of Present Illness:      Cami Cardenas is a 29 year old female who presents for consultation regarding chronic rhinitis. Over the last 2 years, primarily in late May, she has intensely itchy red eyes, stuffy/runny nose, and sneezing. especially during high pollen days. When her symptoms get very bad, she takes a regimen of allergy medication to function- Claritin, allegra, benadryl, xyxal, zyrtec. She normally takes 1 claritin and 2 benadry PRN, and alternates antihistamines every 90 days d/t losing efficacy. Claritin seems to work the best. She has used Neti pot saline rinses in the past which clears the stuffiness, but triggers her gag reflex. She reports mint products cause dizziness/nausea/HA, rash when on skin, ingestion causes vomiting.     Her sister mentions that she snores softly at night.  Denies history of trauma to the nose    Number of episodes per year: chronic  Facial pain/pressure: no  Nasal drainage: yes  Nasal obstruction: yes  Changes in smell or taste: n/a  Epistaxis: no    History of asthma/allergy: yes    Prior medication trials: Claritin, allegra, benadryl, xyxal, zyrtec, neti pot saline rinse  Prior sinus surgery: no  Prior allergy testing with positives: pollen, dustmites  Prior CT scans: no    Other otolaryngic complaints: no    Sino-Nasal Outcome Test (SNOT - 22)  1. Need to Blow Nose: (Patient-Rptd) (P) Moderate  2. Nasal Blockage:  (Patient-Rptd) (P) Mild or slight  3. Sneezing: (Patient-Rptd) (P) Mild or slight  4. Runny Nose: (Patient-Rptd) (P) Moderate  5. Cough: (Patient-Rptd) (P) None  6. Post-nasal discharge: (Patient-Rptd) (P) Moderate  7. Thick nasal discharge: (Patient-Rptd) (P) None, Very mild  8. Ear fullness: (Patient-Rptd) (P) None  9. Dizziness: (Patient-Rptd) (P) None  10. Ear Pain: (Patient-Rptd) (P) None  11. Facial pain/pressure: (Patient-Rptd) (P) Very mild  12. Decreased Sense of Smell/Taste: (Patient-Rptd) (P) None  13. Difficulty falling asleep: (Patient-Rptd) (P) Mild or slight  14. Wake up at night: (Patient-Rptd) (P) Mild or slight  15. Lack of a good night's sleep: (Patient-Rptd) (P) Mild or slight  16. Wake up tired: (Patient-Rptd) (P) Mild or slight  17. Fatigue: (Patient-Rptd) (P) Very mild  18. Reduced Productivity: (Patient-Rptd) (P) None  19. Reduced Concentration: (Patient-Rptd) (P) None  20. Frustrated/restless/irritable: (Patient-Rptd) (P) None  21. Sad: (Patient-Rptd) (P) None  22. Embarrassed: (Patient-Rptd) (P) None  Total Score: (P) 23       Review of Systems:  A comprehensive 14-point review of systems was performed with positives and pertinent negatives listed in the HPI.    Past Medical/Surgical History:  Reviewed today with the patient. No history of major medical comorbidities. Does not take any blood thinners. No prior history of sinonasal surgery or trauma.    Allergies:     No Known Allergies    Medical History:  Past Medical History:   Diagnosis Date     NO ACTIVE PROBLEMS         Surgical History:   Past Surgical History:   Procedure Laterality Date     DENTAL SURGERY      dental implant        Family History:  Family History   Problem Relation Age of Onset     Arthritis Mother      Genetic Disorder Mother         Celiac Disease     High cholesterol Father      Hyperlipidemia Father      Diabetes Paternal Grandmother      Hypertension Paternal Grandmother      Coronary Artery Disease Other          Paternal Great Grandfather     Hypertension Other         Maternal Uncle     Hyperlipidemia Other         Maternal Aunt     Cerebrovascular Disease Other         Paternal Great Grandmother     Other Cancer Other         Maternal Great Uncle; Kidneys     Mental Illness Other         Alzheimer's in various older Maternal relations     Obesity Other         Maternal Aunts (2)     Hypertension Maternal Grandmother      Hyperlipidemia Maternal Grandmother      Breast Cancer Maternal Grandmother      Colon Cancer Maternal Grandmother      Other Cancer Maternal Grandmother         Kidneys     Hypertension Maternal Grandfather      Hyperlipidemia Maternal Grandfather      Prostate Cancer Maternal Grandfather      Other Cancer Maternal Grandfather         Bone Cancer (C.O.D.)     Prostate Cancer Paternal Grandfather      Depression Paternal Grandfather      Anxiety Disorder Paternal Grandfather      Mental Illness Paternal Grandfather         Parkinson's and Dementia        Social History:   Social History     Socioeconomic History     Marital status: Single   Tobacco Use     Smoking status: Never     Passive exposure: Never     Smokeless tobacco: Never   Vaping Use     Vaping status: Never Used   Substance and Sexual Activity     Alcohol use: No     Drug use: No     Sexual activity: Never   Other Topics Concern     Parent/sibling w/ CABG, MI or angioplasty before 65F 55M? No     Social Drivers of Health     Financial Resource Strain: Low Risk  (12/31/2023)    Financial Resource Strain      Within the past 12 months, have you or your family members you live with been unable to get utilities (heat, electricity) when it was really needed?: No   Food Insecurity: Low Risk  (12/31/2023)    Food Insecurity      Within the past 12 months, did you worry that your food would run out before you got money to buy more?: No      Within the past 12 months, did the food you bought just not last and you didn t have money to get more?: No  "  Transportation Needs: Low Risk  (12/31/2023)    Transportation Needs      Within the past 12 months, has lack of transportation kept you from medical appointments, getting your medicines, non-medical meetings or appointments, work, or from getting things that you need?: No   Physical Activity: Insufficiently Active (11/10/2021)    Exercise Vital Sign      Days of Exercise per Week: 2 days      Minutes of Exercise per Session: 30 min   Stress: No Stress Concern Present (11/10/2021)    [employer] - Occupational Stress Questionnaire      Feeling of Stress : Only a little   Social Connections: Socially Isolated (11/10/2021)    Social Connection and Isolation Panel [NHANES]      Frequency of Communication with Friends and Family: Once a week      Frequency of Social Gatherings with Friends and Family: Once a week      Attends Alev[employer] Services: Never      Active Member of Clubs or Organizations: No      Marital Status: Never    Interpersonal Safety: Low Risk  (1/2/2024)    Interpersonal Safety      Do you feel physically and emotionally safe where you currently live?: Yes      Within the past 12 months, have you been hit, slapped, kicked or otherwise physically hurt by someone?: No      Within the past 12 months, have you been humiliated or emotionally abused in other ways by your partner or ex-partner?: No   Housing Stability: Low Risk  (12/31/2023)    Housing Stability      Do you have housing? : Yes      Are you worried about losing your housing?: No        Family History:  Reviewed with patient. No pertinent positives.    Physical Examination:  Vital signs:      BP: 125/80 Pulse: 83     SpO2: 97 %     Height: 5' 11.34\" (181.2 cm) Weight: 293 lb 4.8 oz (133 kg)  Estimated body mass index is 40.52 kg/m  as calculated from the following:    Height as of this encounter: 5' 11.34\" (1.812 m).    Weight as of this encounter: 293 lb 4.8 oz (133 kg).    Constitutional/Psychiatric: This " is a well-appearing, well-dressed patient in no acute distress. female communicates easily with no obvious speech issues. Oriented to self and environment with appropriate conversation. Does not appear depressed, anxious, or agitated.  Head: Normocephalic. Overall inspection reveals no prior scars, lesions, or masses. Facial motion is symmetric. No sinus tenderness.  Eyes: Extra-ocular motions are intact with symmetric gaze. Conjunctiva clear. No eyelid lesions. Pupils round, symmetric, and reactive to light.  Ears: Auricles without masses or lesions bilaterally. External auditory canals clear with minimal non-obstructive cerumen. Tympanic membranes intact without middle ear fluid or retraction. Hearing intact grossly at conversational volume.  Oral Cavity/Oropharynx: Lips, gingiva and teeth appear healthy.   Neck/Lymphatic: Flat, symmetric without detectable lymphadenopathy. Respiratory: No increased work of breathing or respiratory distress. No audible stridor or stertor.  Peripheral/Cardiovascular: Brisk capillary refill bilaterally.   Neurologic: Cranial nerves II-XII grossly intact as tested.    Nasal examination: No lesions, masses, or scars of the external nose are visualized. I do not appreciate any external deviation of the bony nasal vault. External valves patent without inspiratory alar collapse. Columella is midline.      Procedure Note: Diagnostic Nasal Endoscopy, CPT 81973  Date of Service: October 24, 2024  Provider: Deyvi Gonzales MD   Presumptive Diagnosis: No primary diagnosis found.  Anesthesia: Topical lidocaine and oxymetazoline via atomizer    Indication:  Evaluation of nasal/sinus complaints; inadequate visualization with anterior rhinoscopy    Description of procedure:  After obtaining consent for the procedure from the patient, the sinonasal cavity was sprayed with topical anesthetic. A rigid 30-degree nasal endoscope was used to first used to visualize the nasal floor and the nasopharynx on  the left. A second pass was then made to visualize the middle meatus and sphenoethmoid recess. Finally, the scope was turned 90-degrees and used to visualize the olfactory cleft and frontal outflow tract. A similar approach was used for the contralateral side. She tolerated the procedure well without difficulty.    Endoscopic Findings:    Aiden-Sandro Endoscopic Scoring System  Endoscopic observation Right Left   Polyps in middle meatus (0 = absent, 1 = restricted to middle meatus, 2 = Beyond middle meatus) 0 0   Discharge (0 = absent, 1 = thin and clear, 2 = thick, purulent) 0 0   Edema (0 = absent, 1 = mild-moderate, 2 = moderate-severe) 1 1   Crusting (0 = absent, 1 = mild-moderate, 2 = moderate-severe) 0 0   Scarring (0= absent, 1 = mild-moderate, 2 = moderate-severe) 0 0   Total 1 1     LT/RT: Bilateral sinonasal passages patent without evidence of infection, polyps, or mass.     Evidence of underlying sinonasal edema most consistent with allergic rhinitis.    Final Assessment/Plan: No evidence of nasal polyps/septal deviation/anatomy that would increase risk of underlying symptoms.     Continue Antihistamine once daily  Start Azlastine nasal spray once a day in tandem with nasonex nasal spray. Azlastine samples provided.  Start Nasal Rinse - Recommended using Navage machine to bypass gag reflex. Recommended Simply Saline for aerosolized option if rinses do not work.  Discussed option to use Singulair or Ryaltris if above medications do not improve  Follow up with me as needed.      Scribe Disclosure:   I, JUANIS APONTE, am serving as a scribe; to document services personally performed by Deyvi Gonzales MD -based on data collection and the provider's statements to me.     Provider Disclosure:  I agree with above History, Review of Systems, Physical exam and Plan.  I have reviewed the content of the documentation and have edited it as needed. I have personally performed the services documented here and the  documentation accurately represents those services and the decisions I have made.      Electronically signed:  Deyvi Gonzales MD    [facility]  Rhinology  Endoscopic Skull Base Surgery  [facility]  Department of Otolaryngology - Head & Neck Surgery        Again, thank you for allowing me to participate in the care of your patient.      Sincerely,    Deyvi Gonzales MD

## 2024-12-09 ENCOUNTER — OFFICE VISIT (OUTPATIENT)
Dept: OBGYN | Facility: CLINIC | Age: 29
End: 2024-12-09
Payer: COMMERCIAL

## 2024-12-09 VITALS — DIASTOLIC BLOOD PRESSURE: 70 MMHG | SYSTOLIC BLOOD PRESSURE: 118 MMHG

## 2024-12-09 DIAGNOSIS — Z30.09 COUNSELING FOR BIRTH CONTROL, INTRAUTERINE DEVICE: Primary | ICD-10-CM

## 2024-12-09 DIAGNOSIS — Z01.812 PRE-PROCEDURE LAB EXAM: Primary | ICD-10-CM

## 2024-12-09 PROCEDURE — 99213 OFFICE O/P EST LOW 20 MIN: CPT

## 2024-12-09 RX ORDER — LORAZEPAM 1 MG/1
1 TABLET ORAL EVERY 6 HOURS PRN
Qty: 1 TABLET | Refills: 0 | Status: SHIPPED | OUTPATIENT
Start: 2024-12-09

## 2024-12-09 RX ORDER — MISOPROSTOL 200 UG/1
200 TABLET ORAL ONCE
Qty: 1 TABLET | Refills: 0 | Status: SHIPPED | OUTPATIENT
Start: 2024-12-09 | End: 2024-12-09

## 2024-12-09 RX ORDER — MEDROXYPROGESTERONE ACETATE 150 MG/ML
150 INJECTION, SUSPENSION INTRAMUSCULAR
COMMUNITY
Start: 2024-09-30

## 2024-12-09 NOTE — PROGRESS NOTES
SUBJECTIVE:                                                   Cami Cardenas is a 29 year old female who presents to clinic today for the following health issue(s):  Patient presents with:  Consult: Discuss IUD options, pt is currently on Depo but is considering switching methods.      HPI:  Patient currently has depo, due for next injection today. Is interested is switching to IUD. Depo has been working well, but worried about the new law suit and depo causing increased risk of meningioma.   Has family history of stroke and brain aneurysm.     Patient has never had intercourse, has history of heavy periods.     No LMP recorded. (Menstrual status: Birth Control).    Patient is not sexually active,   Using not sexually active for contraception.    reports that she has never smoked. She has never been exposed to tobacco smoke. She has never used smokeless tobacco.    STD testing offered?  Declined    Health maintenance updated:  yes    Today's PHQ-2 Score:       2024     7:24 AM   PHQ-2 (  [employer])   Q1: Little interest or pleasure in doing things 0    Q2: Feeling down, depressed or hopeless 0    PHQ-2 Score 0   Q1: Little interest or pleasure in doing things Not at all   Q2: Feeling down, depressed or hopeless Not at all   PHQ-2 Score 0       Patient-reported     Today's PHQ-9 Score:       1/10/2024    10:35 AM   PHQ-9 SCORE   PHQ-9 Total Score MyChart 0   PHQ-9 Total Score 0     Today's FLORENTIN-7 Score:        No data to display                Problem list and histories reviewed & adjusted, as indicated.  Additional history: as documented.    Patient Active Problem List   Diagnosis    Hematoma of leg    Sprain and strain of hip and thigh    Leg pain    Morbid obesity (H)     Past Surgical History:   Procedure Laterality Date    DENTAL SURGERY      dental implant      Social History     Tobacco Use    Smoking status: Never     Passive exposure: Never    Smokeless tobacco: Never   Substance Use Topics     Alcohol use: No      Problem (# of Occurrences) Relation (Name,Age of Onset)    Anxiety Disorder (1) Paternal Grandfather (Grandpa)    Mental Illness (2) Other (Great Aunt): Alzheimer's in various older Maternal relations, Paternal Grandfather (Grandpa): Parkinson's and Dementia    Arthritis (1) Mother (Mom)    Depression (1) Paternal Grandfather (Grandpa)    Diabetes (1) Paternal Grandmother (Paternal Grandmother)    Genetic Disorder (1) Mother (Mom): Celiac Disease    Hypertension (4) Paternal Grandmother (Paternal Grandmother), Other (Great Aunt): Maternal Uncle, Maternal Grandmother (Grandma), Maternal Grandfather (Grandpa)    Cerebrovascular Disease (1) Other (Great Aunt): Paternal Great Grandmother    Obesity (1) Other (Great Aunt): Maternal Aunts (2)    Breast Cancer (1) Maternal Grandmother (Grandma)    Prostate Cancer (2) Maternal Grandfather (Grandpa), Paternal Grandfather (Grandpa)    High cholesterol (1) Father (Father)    Other Cancer (3) Other (Great Aunt): Maternal Great Uncle; Kidneys, Maternal Grandmother (Grandma): Kidneys, Maternal Grandfather (Grandpa): Bone Cancer (C.O.D.)    Hyperlipidemia (4) Father (Father), Other (Great Aunt): Maternal Aunt, Maternal Grandmother (Grandma), Maternal Grandfather (Grandpa)    Coronary Artery Disease (1) Other (Great Aunt): Paternal Great Grandfather    Colon Cancer (1) Maternal Grandmother (Grandma)              Current Outpatient Medications   Medication Sig Dispense Refill    diphenhydrAMINE (BENADRYL) 25 MG tablet Take 1-2 tablets (25-50 mg) by mouth every 6 hours as needed for itching or allergies 30 tablet 1    medroxyPROGESTERone (DEPO-PROVERA) 150 MG/ML IM injection Inject 150 mg into the muscle every 3 months.      mometasone (NASONEX) 50 MCG/ACT nasal spray Spray 2 sprays into both nostrils at bedtime 17 g 3    olopatadine (PATANOL) 0.1 % ophthalmic solution INSTILL 1 DROP IN BOTH EYES TWICE DAILY Strength: 0.1 % 5 mL 1     Current  Facility-Administered Medications   Medication Dose Route Frequency Provider Last Rate Last Admin    medroxyPROGESTERone (DEPO-PROVERA) injection 150 mg  150 mg Intramuscular Q90 Days    150 mg at 09/30/24 1143     Allergies   Allergen Reactions    Dust Mites Unknown    Minthoxtachys Dizziness, Headache, Nausea and Nausea and Vomiting     Allergic to all mints       OBJECTIVE:     /70   There is no height or weight on file to calculate BMI.    Exam:  Constitutional:  Appearance: Well nourished, well developed alert, in no acute distress  Psychiatric:  Mentation appears normal and affect normal/bright.     In-Clinic Test Results:  No results found for this or any previous visit (from the past 24 hours).    ASSESSMENT/PLAN:                                                        ICD-10-CM    1. Counseling for birth control, intrauterine device  Z30.09           There are no Patient Instructions on file for this visit.  I reviewed with the patient options for contraception today.  We discussed the birth control pill, the patch, the NuvaRing, Depo-Provera, Nexplanon, and the Paraguard, Mirena, Joann, and Kyleena IUDs.  We reviewed the risks, benefits, side effects, failure rates, and potential complications of each of these options for birth control.   We reviewed the insertion process of the Nexplanon and IUDs.  We reviewed the effectiveness of each of the options.  We discussed that none of these protects against STI's. All questions and concerns addressed.   After our discussion patient has decided on Mirena IUD.    Discussed pain control options. Would like bre and ativan.       ANNETTA Dillard [facility] [place]

## 2024-12-10 ENCOUNTER — OFFICE VISIT (OUTPATIENT)
Dept: OBGYN | Facility: CLINIC | Age: 29
End: 2024-12-10
Payer: COMMERCIAL

## 2024-12-10 ENCOUNTER — LAB (OUTPATIENT)
Dept: LAB | Facility: CLINIC | Age: 29
End: 2024-12-10
Payer: COMMERCIAL

## 2024-12-10 VITALS — SYSTOLIC BLOOD PRESSURE: 124 MMHG | DIASTOLIC BLOOD PRESSURE: 68 MMHG | WEIGHT: 293 LBS | BODY MASS INDEX: 40.89 KG/M2

## 2024-12-10 DIAGNOSIS — Z30.430 ENCOUNTER FOR INSERTION OF INTRAUTERINE CONTRACEPTIVE DEVICE: Primary | ICD-10-CM

## 2024-12-10 DIAGNOSIS — Z01.812 PRE-PROCEDURE LAB EXAM: ICD-10-CM

## 2024-12-10 LAB — HCG UR QL: NEGATIVE

## 2024-12-10 PROCEDURE — 58300 INSERT INTRAUTERINE DEVICE: CPT

## 2024-12-10 PROCEDURE — 81025 URINE PREGNANCY TEST: CPT

## 2024-12-10 ASSESSMENT — ANXIETY QUESTIONNAIRES
5. BEING SO RESTLESS THAT IT IS HARD TO SIT STILL: NOT AT ALL
7. FEELING AFRAID AS IF SOMETHING AWFUL MIGHT HAPPEN: NOT AT ALL
3. WORRYING TOO MUCH ABOUT DIFFERENT THINGS: SEVERAL DAYS
IF YOU CHECKED OFF ANY PROBLEMS ON THIS QUESTIONNAIRE, HOW DIFFICULT HAVE THESE PROBLEMS MADE IT FOR YOU TO DO YOUR WORK, TAKE CARE OF THINGS AT HOME, OR GET ALONG WITH OTHER PEOPLE: NOT DIFFICULT AT ALL
GAD7 TOTAL SCORE: 2
GAD7 TOTAL SCORE: 2
1. FEELING NERVOUS, ANXIOUS, OR ON EDGE: SEVERAL DAYS
2. NOT BEING ABLE TO STOP OR CONTROL WORRYING: NOT AT ALL
6. BECOMING EASILY ANNOYED OR IRRITABLE: NOT AT ALL

## 2024-12-10 ASSESSMENT — PATIENT HEALTH QUESTIONNAIRE - PHQ9
SUM OF ALL RESPONSES TO PHQ QUESTIONS 1-9: 0
5. POOR APPETITE OR OVEREATING: NOT AT ALL

## 2024-12-10 NOTE — PROGRESS NOTES
IUD Insertion:  CONSULT:    Is a pregnancy test required: Yes.  Was it positive or negative?  Negative  Was a consent obtained?  Yes    Subjective: Cami Cardenas is a 29 year old  presents for IUD and desires Mirena type IUD.    Patient has been given the opportunity to ask questions about all forms of birth control, including all options appropriate for Cami Cardenas. Discussed that no method of birth control, except abstinence is 100% effective against pregnancy or sexually transmitted infection.     Cami Cardenas understands she may have the IUD removed at any time. IUD should be removed by a health care provider.    The entire insertion procedure was reviewed with the patient, including care after placement.    No LMP recorded. (Menstrual status: Birth Control). Has current contraception. No allergy to betadine or shellfish. Patient declines STD screening  hCG Urine Qualitative   Date Value Ref Range Status   12/10/2024 Negative Negative Final     Comment:     This test is for screening purposes.  Results should be interpreted along with the clinical picture.  Confirmation testing is available if warranted by ordering [id number], HCG Quantitative Pregnancy.         There were no vitals taken for this visit.    Pelvic Exam:   EG/BUS: normal genital architecture without lesions, erythema or abnormal secretions.   Vagina: moist, pink, rugae with physiologic discharge and secretions  Cervix: nulliparous no lesions and pink, moist, closed, without lesion or CMT  Uterus: midline position, mobile, no pain  Adnexa: within normal limits and no masses, nodularity, tenderness    PROCEDURE NOTE: -- IUD Insertion    Reason for Insertion: contraception    Premedicated with cytotec.  Under sterile technique, cervix was visualized with speculum and prepped with Betadine solution swab x 3. Tenaculum was placed for stability. The uterus was gently straightened and sounded to 8.5 cm. IUD prepared for placement, and  IUD inserted according to 's instructions without difficulty or significant resitance, and deployed at the fundus. The strings were visualized and trimmed to 2.5 cm from the external os. Tenaculum was removed and hemostasis noted. Speculum removed.  Patient tolerated procedure well.    EBL: minimal    Complications: none    LOT: [id number]  EXP: 02/2027    ASSESSMENT:     ICD-10-CM    1. Encounter for insertion of intrauterine contraceptive device  Z30.430            PLAN:    Given 's handouts, including when to have IUD removed, list of danger s/sx, side effects and follow up recommended. Encouraged condom use for prevention of STD. Back up contraception advised for 7 days if progestin method. Advised to call for any fever, for prolonged or severe pain or bleeding, abnormal vaginal discharge, or unable to palpate strings. She was advised to use pain medications (ibuprofen) as needed for mild to moderate pain. Advised to follow-up in clinic in 4-6 weeks for IUD string check if unable to find strings or as directed by provider.     Corinne Garrett PA-C

## 2025-01-24 NOTE — PROGRESS NOTES
SUBJECTIVE:                                                   Cami Cadrenas is a 29 year old female who presents to clinic today for the following health issue(s):  Patient presents with:  IUD: Patient is here for IUD check, Mirena was placed on 12/10/2025.  Patient reports since having IUD placed she has been having brownish bleeding and sharp pain on right side and pain radiates down right leg.       HPI:  Cami here today for IUD check. Mirena placed 12/10/24. Since has had brown discharge. Has had right sided pelvic pain/cramping that radiates down her leg.   Denies itching, burning, discharge, fevers, chills, n/v.     No LMP recorded (lmp unknown). (Menstrual status: Birth Control)..     Patient is not sexually active, .  Using IUD for contraception.    reports that she has never smoked. She has never been exposed to tobacco smoke. She has never used smokeless tobacco.  STD testing offered?  Declined    Health maintenance updated:  yes    Today's PHQ-2 Score:       2025    10:54 AM   PHQ-2 (  [employer])   Q1: Little interest or pleasure in doing things 0   Q2: Feeling down, depressed or hopeless 0   PHQ-2 Score 0    Q1: Little interest or pleasure in doing things Not at all   Q2: Feeling down, depressed or hopeless Not at all   PHQ-2 Score 0       Patient-reported     Today's PHQ-9 Score:       2025     9:23 AM   PHQ-9 SCORE   PHQ-9 Total Score 0     Today's FLORENTIN-7 Score:       2025     9:23 AM   FLORENTIN-7 SCORE   Total Score 0       Problem list and histories reviewed & adjusted, as indicated.  Additional history: as documented.    Patient Active Problem List   Diagnosis    Hematoma of leg    Sprain and strain of hip and thigh    Leg pain    Morbid obesity (H)     Past Surgical History:   Procedure Laterality Date    DENTAL SURGERY      dental implant      Social History     Tobacco Use    Smoking status: Never     Passive exposure: Never    Smokeless tobacco: Never   Substance Use  Topics    Alcohol use: No      Problem (# of Occurrences) Relation (Name,Age of Onset)    Anxiety Disorder (1) Paternal Grandfather (Grandpa)    Mental Illness (2) Other (Great Aunt): Alzheimer's in various older Maternal relations, Paternal Grandfather (Grandpa): Parkinson's and Dementia    Arthritis (1) Mother (Mom)    Depression (1) Paternal Grandfather (Grandpa)    Diabetes (1) Paternal Grandmother (Paternal Grandmother)    Genetic Disorder (1) Mother (Mom): Celiac Disease    Hypertension (4) Paternal Grandmother (Paternal Grandmother), Other (Great Aunt): Maternal Uncle, Maternal Grandmother (Grandma), Maternal Grandfather (Grandpa)    Cerebrovascular Disease (1) Other (Great Aunt): Paternal Great Grandmother    Obesity (1) Other (Great Aunt): Maternal Aunts (2)    Breast Cancer (1) Maternal Grandmother (Grandma)    Prostate Cancer (2) Maternal Grandfather (Grandpa), Paternal Grandfather (Grandpa)    High cholesterol (1) Father (Father)    Other Cancer (3) Other (Great Aunt): Maternal Great Uncle; Kidneys, Maternal Grandmother (Grandma): Kidneys, Maternal Grandfather (Grandpa): Bone Cancer (C.O.D.)    Hyperlipidemia (4) Father (Father), Other (Great Aunt): Maternal Aunt, Maternal Grandmother (Grandma), Maternal Grandfather (Grandpa)    Coronary Artery Disease (1) Other (Great Aunt): Paternal Great Grandfather    Colon Cancer (1) Maternal Grandmother (Grandma)              Current Outpatient Medications   Medication Sig Dispense Refill    diphenhydrAMINE (BENADRYL) 25 MG tablet Take 1-2 tablets (25-50 mg) by mouth every 6 hours as needed for itching or allergies 30 tablet 1    levonorgestrel (MIRENA) 52 MG (20 mcg/day) IUD 1 each by Intrauterine route once.      mometasone (NASONEX) 50 MCG/ACT nasal spray Spray 2 sprays into both nostrils at bedtime 17 g 3    olopatadine (PATANOL) 0.1 % ophthalmic solution INSTILL 1 DROP IN BOTH EYES TWICE DAILY Strength: 0.1 % 5 mL 1    LORazepam (ATIVAN) 1 MG tablet Take 1  tablet (1 mg) by mouth every 6 hours as needed for anxiety. (Patient not taking: Reported on 1/28/2025) 1 tablet 0    medroxyPROGESTERone (DEPO-PROVERA) 150 MG/ML IM injection Inject 150 mg into the muscle every 3 months.       Current Facility-Administered Medications   Medication Dose Route Frequency Provider Last Rate Last Admin    medroxyPROGESTERone (DEPO-PROVERA) injection 150 mg  150 mg Intramuscular Q90 Days    150 mg at 09/30/24 1143     Allergies   Allergen Reactions    Dust Mites Unknown    Minthoxtachys Dizziness, Headache, Nausea and Nausea and Vomiting     Allergic to all mints       OBJECTIVE:     /80 (BP Location: Right arm, Patient Position: Sitting, Cuff Size: Adult Large)   Wt (!) 137.2 kg (302 lb 6.4 oz)   LMP  (LMP Unknown)   BMI 41.78 kg/m    Body mass index is 41.78 kg/m .    Exam:  Constitutional:  Appearance: Well nourished, well developed alert, in no acute distress  Psychiatric:  Mentation appears normal and affect normal/bright.  Pelvic Exam:  External Genitalia:     Normal appearance for age, no discharge present, no tenderness present, no inflammatory lesions present, color normal  Vagina:     Normal vaginal vault without central or paravaginal defects, no discharge present, no inflammatory lesions present, no masses present  Bladder:     Nontender to palpation  Urethra:   Urethral Body:  Urethra palpation normal, urethra structural support normal   Urethral Meatus:  No erythema or lesions present  Cervix:     Appearance healthy, no lesions present, nontender to palpation, no bleeding present. Strings not present on exam  Uterus:     Uterus: firm, normal sized and nontender, anteverted in position.   Adnexa:     No adnexal tenderness present, no adnexal masses present  Perineum:     Perineum within normal limits, no evidence of trauma, no rashes or skin lesions present  Anus:     Anus within normal limits, no hemorrhoids present  Inguinal Lymph Nodes:     No lymphadenopathy  present  Pubic Hair:     Normal pubic hair distribution for age  Genitalia and Groin:     No rashes present, no lesions present, no areas of discoloration, no masses present     In-Clinic Test Results:  No results found for this or any previous visit (from the past 24 hours).    ASSESSMENT/PLAN:                                                        ICD-10-CM    1. IUD check up  Z30.431 US Transvaginal Pelvic Non-OB      2. Pelvic pain  R10.2 US Transvaginal Pelvic Non-OB          There are no Patient Instructions on file for this visit.    -recommend pelvic US for IUD localization and to assess R sided pain/discomfort    ANNETTA Dillard [facility] [place]

## 2025-01-28 ENCOUNTER — OFFICE VISIT (OUTPATIENT)
Dept: OBGYN | Facility: CLINIC | Age: 30
End: 2025-01-28
Payer: COMMERCIAL

## 2025-01-28 VITALS — WEIGHT: 293 LBS | BODY MASS INDEX: 41.78 KG/M2 | SYSTOLIC BLOOD PRESSURE: 132 MMHG | DIASTOLIC BLOOD PRESSURE: 80 MMHG

## 2025-01-28 DIAGNOSIS — Z30.431 IUD CHECK UP: Primary | ICD-10-CM

## 2025-01-28 DIAGNOSIS — R10.2 PELVIC PAIN: ICD-10-CM

## 2025-01-28 PROCEDURE — 99212 OFFICE O/P EST SF 10 MIN: CPT

## 2025-01-28 ASSESSMENT — ANXIETY QUESTIONNAIRES
GAD7 TOTAL SCORE: 0
2. NOT BEING ABLE TO STOP OR CONTROL WORRYING: NOT AT ALL
5. BEING SO RESTLESS THAT IT IS HARD TO SIT STILL: NOT AT ALL
IF YOU CHECKED OFF ANY PROBLEMS ON THIS QUESTIONNAIRE, HOW DIFFICULT HAVE THESE PROBLEMS MADE IT FOR YOU TO DO YOUR WORK, TAKE CARE OF THINGS AT HOME, OR GET ALONG WITH OTHER PEOPLE: NOT DIFFICULT AT ALL
1. FEELING NERVOUS, ANXIOUS, OR ON EDGE: NOT AT ALL
7. FEELING AFRAID AS IF SOMETHING AWFUL MIGHT HAPPEN: NOT AT ALL
6. BECOMING EASILY ANNOYED OR IRRITABLE: NOT AT ALL
GAD7 TOTAL SCORE: 0
3. WORRYING TOO MUCH ABOUT DIFFERENT THINGS: NOT AT ALL

## 2025-01-28 ASSESSMENT — PATIENT HEALTH QUESTIONNAIRE - PHQ9
5. POOR APPETITE OR OVEREATING: NOT AT ALL
SUM OF ALL RESPONSES TO PHQ QUESTIONS 1-9: 0

## 2025-02-07 ENCOUNTER — ANCILLARY PROCEDURE (OUTPATIENT)
Dept: ULTRASOUND IMAGING | Facility: CLINIC | Age: 30
End: 2025-02-07
Payer: COMMERCIAL

## 2025-02-07 DIAGNOSIS — Z30.431 IUD CHECK UP: ICD-10-CM

## 2025-02-07 DIAGNOSIS — R10.2 PELVIC PAIN: ICD-10-CM

## 2025-02-07 PROCEDURE — 76830 TRANSVAGINAL US NON-OB: CPT | Performed by: OBSTETRICS & GYNECOLOGY

## 2025-02-10 ENCOUNTER — VIRTUAL VISIT (OUTPATIENT)
Dept: OBGYN | Facility: CLINIC | Age: 30
End: 2025-02-10
Payer: COMMERCIAL

## 2025-02-10 ENCOUNTER — MYC MEDICAL ADVICE (OUTPATIENT)
Dept: FAMILY MEDICINE | Facility: CLINIC | Age: 30
End: 2025-02-10

## 2025-02-10 DIAGNOSIS — R10.2 PELVIC PAIN: Primary | ICD-10-CM

## 2025-02-10 PROCEDURE — 98013 SYNCH AUDIO-ONLY EST LOW 20: CPT

## 2025-02-10 NOTE — PROGRESS NOTES
Cami Cardenas is a 29 year old female who is being evaluated via a patient initiated billable telephone visit.     What phone number would you like to be contacted at? [phone]   How would you like to obtain your AVS? Martin      Originating Location (pt. Location): Home      Distant Location (provider location):  On-site      SUBJECTIVE:                                                   Cami Cardenas is a 29 year old female who presents for virtual visit today for the following health issue(s):  Patient presents with:  Follow Up: US      Additional information:     HPI:  Cami had US done 25 for pelvic discomfort and IUD localization. IUD placed 12/10/24. Still having intermittent discomfort/pain that is in pelvis and into the hip. Pain first came around [place], then resolved with her period. Restarted with pain 25. Does help with tylenol and aleve.   Denies fevers, chill, n/v, discharge.     No LMP recorded (lmp unknown). (Menstrual status: Birth Control)..     Patient is sexually active, .  Using IUD for contraception.    reports that she has never smoked. She has never been exposed to tobacco smoke. She has never used smokeless tobacco.      Health maintenance updated:  yes    Today's PHQ-2 Score:       2025    10:54 AM   PHQ-2 (  [employer])   Q1: Little interest or pleasure in doing things 0   Q2: Feeling down, depressed or hopeless 0   PHQ-2 Score 0    Q1: Little interest or pleasure in doing things Not at all   Q2: Feeling down, depressed or hopeless Not at all   PHQ-2 Score 0       Patient-reported     Today's PHQ-9 Score:       2025     9:23 AM   PHQ-9 SCORE   PHQ-9 Total Score 0     Today's FLORENTIN-7 Score:       2025     9:23 AM   FLORENTIN-7 SCORE   Total Score 0       Problem list and histories reviewed & adjusted, as indicated.  Additional history: as documented.    Patient Active Problem List   Diagnosis    Hematoma of leg    Sprain and strain of hip and thigh     Leg pain    Morbid obesity (H)     Past Surgical History:   Procedure Laterality Date    DENTAL SURGERY      dental implant      Social History     Tobacco Use    Smoking status: Never     Passive exposure: Never    Smokeless tobacco: Never   Substance Use Topics    Alcohol use: No      Problem (# of Occurrences) Relation (Name,Age of Onset)    Anxiety Disorder (1) Paternal Grandfather (Grandpa)    Mental Illness (2) Other (Great Aunt): Alzheimer's in various older Maternal relations, Paternal Grandfather (Grandpa): Parkinson's and Dementia    Arthritis (1) Mother (Mom)    Depression (1) Paternal Grandfather (Grandpa)    Diabetes (1) Paternal Grandmother (Paternal Grandmother)    Genetic Disorder (1) Mother (Mom): Celiac Disease    Hypertension (4) Paternal Grandmother (Paternal Grandmother), Other (Great Aunt): Maternal Uncle, Maternal Grandmother (Grandma), Maternal Grandfather (Grandpa)    Cerebrovascular Disease (1) Other (Great Aunt): Paternal Great Grandmother    Obesity (1) Other (Great Aunt): Maternal Aunts (2)    Breast Cancer (1) Maternal Grandmother (Grandma)    Prostate Cancer (2) Maternal Grandfather (Grandpa), Paternal Grandfather (Grandpa)    High cholesterol (1) Father (Father)    Other Cancer (3) Other (Great Aunt): Maternal Great Uncle; Kidneys, Maternal Grandmother (Grandma): Kidneys, Maternal Grandfather (Grandpa): Bone Cancer (C.O.D.)    Hyperlipidemia (4) Father (Father), Other (Great Aunt): Maternal Aunt, Maternal Grandmother (Grandma), Maternal Grandfather (Grandpa)    Coronary Artery Disease (1) Other (Great Aunt): Paternal Great Grandfather    Colon Cancer (1) Maternal Grandmother (Grandma)              Current Outpatient Medications   Medication Sig Dispense Refill    diphenhydrAMINE (BENADRYL) 25 MG tablet Take 1-2 tablets (25-50 mg) by mouth every 6 hours as needed for itching or allergies 30 tablet 1    levonorgestrel (MIRENA) 52 MG (20 mcg/day) IUD 1 each by Intrauterine route once.       mometasone (NASONEX) 50 MCG/ACT nasal spray Spray 2 sprays into both nostrils at bedtime 17 g 3    olopatadine (PATANOL) 0.1 % ophthalmic solution INSTILL 1 DROP IN BOTH EYES TWICE DAILY Strength: 0.1 % 5 mL 1    LORazepam (ATIVAN) 1 MG tablet Take 1 tablet (1 mg) by mouth every 6 hours as needed for anxiety. (Patient not taking: Reported on 2/10/2025) 1 tablet 0     No current facility-administered medications for this visit.     Allergies   Allergen Reactions    Dust Mites Unknown    Minthoxtachys Dizziness, Headache, Nausea and Nausea and Vomiting     Allergic to all mints         OBJECTIVE:     No vitals were obtained today due to virtual telephone visit.    Physical Exam  GENERAL: alert and no distress  RESP: No audible wheeze, cough, or visible cyanosis.    SKIN: Visible skin clear. No significant rash, abnormal pigmentation or lesions.  NEURO: Cranial nerves grossly intact.  Mentation and speech appropriate for age.  PSYCH: Appropriate affect, tone, and pace of words      Results for orders placed or performed in visit on 02/07/25   US Transvaginal Pelvic Non-OB    Narrative    Table formatting from the original result was not included.     US Transvaginal Pelvic Non-OB  Order #: [id number] Accession #: [id number]  Study Notes     Elvie Pendleton on 2/7/2025 11:12 AM      Gynecological Ultrasound Report  Pelvic U/S - Transvaginal  [facility]  Referring Provider: Corinne Garrett PA-C  Sonographer:  Elvie Pendleton RDMS  Indication: IUD check up, pelvic pain in female - right  LMP: No LMP recorded (lmp unknown). (Menstrual status: IUD).  History:   Gynecological Ultrasonography:   Uterus: anteverted. Contour is smooth/regular.  Size: 8.7 x 3.8 x 3.6 cm  Endometrium: Thickness Total 2.0 mm  Findings: IUD in uterine cavity, complex area in endometrium 1.9 x 0.4 x   1.0 cm  Right Ovary: 2.1 x 2.1 x 2.9 cm. Multiple follicles vs cysts  Left Ovary: 1.9 x 1.9 x 3.1 cm. Multiple follicles vs  cysts  Cul de Sac Free Fluid: Trace  Technique: Transvaginal Imaging performed     Impression:       Anteverted Uterus   IUD noted within the endometrium. There is complex, avascular area at   level of the stem of the IUD measuring up to 1.9cm.  Ovaries appear normal bilaterally    Marianela Brito MD          ASSESSMENT/PLAN:                                                      Phone call duration: 10 minutes      ICD-10-CM    1. Pelvic pain  R10.2 US Pelvic Complete with Transvaginal          There are no Patient Instructions on file for this visit.    -repeat US in 12 weeks due to avascular area measuring 1.9cm  -IUD in place    ANNETTA Dillard [facility] [place]

## 2025-02-27 SDOH — HEALTH STABILITY: PHYSICAL HEALTH: ON AVERAGE, HOW MANY MINUTES DO YOU ENGAGE IN EXERCISE AT THIS LEVEL?: 30 MIN

## 2025-02-27 SDOH — HEALTH STABILITY: PHYSICAL HEALTH: ON AVERAGE, HOW MANY DAYS PER WEEK DO YOU ENGAGE IN MODERATE TO STRENUOUS EXERCISE (LIKE A BRISK WALK)?: 2 DAYS

## 2025-02-27 ASSESSMENT — SOCIAL DETERMINANTS OF HEALTH (SDOH): HOW OFTEN DO YOU GET TOGETHER WITH FRIENDS OR RELATIVES?: TWICE A WEEK

## 2025-03-04 ENCOUNTER — OFFICE VISIT (OUTPATIENT)
Dept: FAMILY MEDICINE | Facility: CLINIC | Age: 30
End: 2025-03-04
Attending: FAMILY MEDICINE
Payer: COMMERCIAL

## 2025-03-04 VITALS
HEART RATE: 94 BPM | OXYGEN SATURATION: 96 % | BODY MASS INDEX: 39.68 KG/M2 | WEIGHT: 293 LBS | DIASTOLIC BLOOD PRESSURE: 76 MMHG | RESPIRATION RATE: 18 BRPM | HEIGHT: 72 IN | SYSTOLIC BLOOD PRESSURE: 128 MMHG | TEMPERATURE: 97.5 F

## 2025-03-04 DIAGNOSIS — F40.10 SOCIAL ANXIETY DISORDER: ICD-10-CM

## 2025-03-04 DIAGNOSIS — Z13.1 SCREENING FOR DIABETES MELLITUS: ICD-10-CM

## 2025-03-04 DIAGNOSIS — Z00.00 ROUTINE GENERAL MEDICAL EXAMINATION AT A HEALTH CARE FACILITY: Primary | ICD-10-CM

## 2025-03-04 DIAGNOSIS — Z13.220 LIPID SCREENING: ICD-10-CM

## 2025-03-04 DIAGNOSIS — Z12.4 CERVICAL CANCER SCREENING: ICD-10-CM

## 2025-03-04 LAB
CHOLEST SERPL-MCNC: 159 MG/DL
FASTING STATUS PATIENT QL REPORTED: YES
FASTING STATUS PATIENT QL REPORTED: YES
GLUCOSE SERPL-MCNC: 88 MG/DL (ref 70–99)
HDLC SERPL-MCNC: 51 MG/DL
LDLC SERPL CALC-MCNC: 91 MG/DL
NONHDLC SERPL-MCNC: 108 MG/DL
TRIGL SERPL-MCNC: 87 MG/DL

## 2025-03-04 PROCEDURE — 99395 PREV VISIT EST AGE 18-39: CPT | Performed by: FAMILY MEDICINE

## 2025-03-04 PROCEDURE — 82947 ASSAY GLUCOSE BLOOD QUANT: CPT | Performed by: FAMILY MEDICINE

## 2025-03-04 PROCEDURE — 80061 LIPID PANEL: CPT | Performed by: FAMILY MEDICINE

## 2025-03-04 PROCEDURE — 36415 COLL VENOUS BLD VENIPUNCTURE: CPT | Performed by: FAMILY MEDICINE

## 2025-03-04 ASSESSMENT — PAIN SCALES - GENERAL: PAINLEVEL_OUTOF10: NO PAIN (0)

## 2025-03-04 NOTE — PATIENT INSTRUCTIONS
Patient Education   Apply tea tree oil to the affected toenail twice daily for several months.  Preventive Care Advice   This is general advice given by our system to help you stay healthy. However, your care team may have specific advice just for you. Please talk to your care team about your preventive care needs.  Nutrition  Eat 5 or more servings of fruits and vegetables each day.  Try wheat bread, brown rice and whole grain pasta (instead of white bread, rice, and pasta).  Get enough calcium and vitamin D. Check the label on foods and aim for 100% of the RDA (recommended daily allowance).  Lifestyle  Exercise at least 150 minutes each week  (30 minutes a day, 5 days a week).  Do muscle strengthening activities 2 days a week. These help control your weight and prevent disease.  No smoking.  Wear sunscreen to prevent skin cancer.  Have a dental exam and cleaning every 6 months.  Yearly exams  See your health care team every year to talk about:  Any changes in your health.  Any medicines your care team has prescribed.  Preventive care, family planning, and ways to prevent chronic diseases.  Shots (vaccines)   HPV shots (up to age 26), if you've never had them before.  Hepatitis B shots (up to age 59), if you've never had them before.  COVID-19 shot: Get this shot when it's due.  Flu shot: Get a flu shot every year.  Tetanus shot: Get a tetanus shot every 10 years.  Pneumococcal, hepatitis A, and RSV shots: Ask your care team if you need these based on your risk.  Shingles shot (for age 50 and up)  General health tests  Diabetes screening:  Starting at age 35, Get screened for diabetes at least every 3 years.  If you are younger than age 35, ask your care team if you should be screened for diabetes.  Cholesterol test: At age 39, start having a cholesterol test every 5 years, or more often if advised.  Bone density scan (DEXA): At age 50, ask your care team if you should have this scan for osteoporosis (brittle  bones).  Hepatitis C: Get tested at least once in your life.  STIs (sexually transmitted infections)  Before age 24: Ask your care team if you should be screened for STIs.  After age 24: Get screened for STIs if you're at risk. You are at risk for STIs (including HIV) if:  You are sexually active with more than one person.  You don't use condoms every time.  You or a partner was diagnosed with a sexually transmitted infection.  If you are at risk for HIV, ask about PrEP medicine to prevent HIV.  Get tested for HIV at least once in your life, whether you are at risk for HIV or not.  Cancer screening tests  Cervical cancer screening: If you have a cervix, begin getting regular cervical cancer screening tests starting at age 21.  Breast cancer scan (mammogram): If you've ever had breasts, begin having regular mammograms starting at age 40. This is a scan to check for breast cancer.  Colon cancer screening: It is important to start screening for colon cancer at age 45.  Have a colonoscopy test every 10 years (or more often if you're at risk) Or, ask your provider about stool tests like a FIT test every year or Cologuard test every 3 years.  To learn more about your testing options, visit:   .  For help making a decision, visit:   https://bit.ly/sw57925.  Prostate cancer screening test: If you have a prostate, ask your care team if a prostate cancer screening test (PSA) at age 55 is right for you.  Lung cancer screening: If you are a current or former smoker ages 50 to 80, ask your care team if ongoing lung cancer screenings are right for you.  For informational purposes only. Not to replace the advice of your health care provider. Copyright   2023 [employer]. All rights reserved. Clinically reviewed by the [facility]. WiMi5 [id number] - REV 01/24.

## 2025-03-04 NOTE — PROGRESS NOTES
Preventive Care Visit  [facility]  Cristy Pleitez MD, MD, Family Medicine  Mar 4, 2025      Assessment & Plan       ICD-10-CM    1. Routine general medical examination at a health care facility  Z00.00       2. Social anxiety disorder  F40.10       3. Cervical cancer screening  Z12.4 Pap Screen Reflex to HPV if ASCUS - Recommended Age 25 - 29 Years     HPV Hold (Lab Only)      4. Body mass index (BMI) 40.0-44.9, adult (H)  Z68.41       5. Lipid screening  Z13.220 Lipid panel reflex to direct LDL Fasting     Lipid panel reflex to direct LDL Fasting      6. Screening for diabetes mellitus  Z13.1 Glucose     Glucose         Routine general medical examination at a health care facility   doing well   pap completed today  She got her flu shot and updated COVID vaccine for the season  - Glucose    - Lipid panel reflex to direct LDL Fasting      Allergic conjunctivitis, bilateral  Seasonal allergies   Better during the winter. May tends to have increase symptoms.   Saw ENT      Hyperhidrosis of axilla - not discussed further today   drysol was not really helpful and she had to stop using it because it was irritating to her skin. Has been using witch hazel, which helps some. She will consider derm referral if needed in the future.      Dysmenorrhea-resolved  followed by gyn  IUD placed in December     Pelvic pain  Seen by gyn  Has plan for repeat ultrasound in May due to avascular area seen on repeat US. IUD was in place      Vaginal discomfort   Improved. Reports she is able to self-pleasure without pain.   Symptoms of vaginismus with tampon insertion. She has never been sexually active. She wonders whether a different choice of OCP would be helpful. I recommended discussing options with gyn.  Referred to gyn.      Class 3 Obesity  Offered referral to weight management clinic, but she declined for now. She is working on dietary changes on her own.        Social anxiety disorder  Was seeing a  "therapist until August -doing well at this point. Therapy tools were helpful.       Family history of cancer  Reviewed family history. Could consider genetics referral - she declined for now.       BMI  Estimated body mass index is 41.69 kg/m  as calculated from the following:    Height as of this encounter: 1.819 m (5' 11.6\").    Weight as of this encounter: 137.9 kg (304 lb).   Weight management plan: diet and exercise    Counseling  Appropriate preventive services were addressed with this patient via screening, questionnaire, or discussion as appropriate          Subjective   Cami is a 29 year old, presenting for the following:  Physical (And pap)        3/4/2025    10:38 AM   Additional Questions   Roomed by Tara laurent   Accompanied by self          HPI  Thick, nonpainful, yellowish toenail.          Advance Care Planning  Patient does not have a Health Care Directive: Discussed advance care planning with patient; however, patient declined at this time.      2/27/2025   General Health   How would you rate your overall physical health? Good   Feel stress (tense, anxious, or unable to sleep) Only a little   (!) STRESS CONCERN      2/27/2025   Nutrition   Three or more servings of calcium each day? Yes   Diet: Regular (no restrictions)   How many servings of fruit and vegetables per day? (!) 2-3   How many sweetened beverages each day? 0-1         2/27/2025   Exercise   Days per week of moderate/strenous exercise 2 days   Average minutes spent exercising at this level 30 min   (!) EXERCISE CONCERN      2/27/2025   Social Factors   Frequency of gathering with friends or relatives Twice a week   Worry food won't last until get money to buy more No   Food not last or not have enough money for food? No   Do you have housing? (Housing is defined as stable permanent housing and does not include staying ouside in a car, in a tent, in an abandoned building, in an overnight shelter, or couch-surfing.) Yes   Are you worried " about losing your housing? No   Lack of transportation? No   Unable to get utilities (heat,electricity)? No         2/27/2025   Dental   Dentist two times every year? Yes            Today's PHQ-2 Score:       3/4/2025    10:30 AM   PHQ-2 ( 1999 [employer])   Q1: Little interest or pleasure in doing things 0   Q2: Feeling down, depressed or hopeless 0   PHQ-2 Score 0    Q1: Little interest or pleasure in doing things Not at all   Q2: Feeling down, depressed or hopeless Not at all   PHQ-2 Score 0       Patient-reported           2/27/2025   Substance Use   Alcohol more than 3/day or more than 7/wk No   Do you use any other substances recreationally? No     Social History     Tobacco Use    Smoking status: Never     Passive exposure: Never    Smokeless tobacco: Never   Vaping Use    Vaping status: Never Used   Substance Use Topics    Alcohol use: No    Drug use: No           12/31/2023   LAST FHS-7 RESULTS   1st degree relative breast or ovarian cancer No   Any relative bilateral breast cancer Unknown   Any male have breast cancer No   Any ONE woman have BOTH breast AND ovarian cancer No   Any woman with breast cancer before 50yrs Unknown   2 or more relatives with breast AND/OR ovarian cancer No   2 or more relatives with breast AND/OR bowel cancer Unknown        Mammogram Screening - Patient under 40 years of age: Routine Mammogram Screening not recommended.         2/27/2025   STI Screening   New sexual partner(s) since last STI/HIV test? No     History of abnormal Pap smear: No - age 21-29 PAP every 3 years recommended        11/10/2021     9:03 AM 8/13/2018     3:05 PM   PAP / HPV   PAP Negative for Intraepithelial Lesion or Malignancy (NILM)     PAP (Historical)  NIL            2/27/2025   Contraception/Family Planning   Questions about contraception or family planning No        Reviewed and updated as needed this visit by Provider                    Past Medical History:   Diagnosis Date    NO ACTIVE PROBLEMS   "    Past Surgical History:   Procedure Laterality Date    DENTAL SURGERY      dental implant          Objective    Exam  /76 (BP Location: Right arm, Patient Position: Sitting, Cuff Size: Adult Large)   Pulse 94   Temp 97.5  F (36.4  C) (Temporal)   Resp 18   Ht 1.819 m (5' 11.6\")   Wt (!) 137.9 kg (304 lb)   LMP  (LMP Unknown)   SpO2 96%   BMI 41.69 kg/m     Estimated body mass index is 41.69 kg/m  as calculated from the following:    Height as of this encounter: 1.819 m (5' 11.6\").    Weight as of this encounter: 137.9 kg (304 lb).  Wt Readings from Last 5 Encounters:   03/04/25 (!) 137.9 kg (304 lb)   01/28/25 (!) 137.2 kg (302 lb 6.4 oz)   12/10/24 134.3 kg (296 lb)   10/24/24 133 kg (293 lb 4.8 oz)   09/30/24 133.2 kg (293 lb 9.6 oz)      Physical Exam  GENERAL: alert and no distress  EYES: Eyes grossly normal to inspection, PERRL and conjunctivae and sclerae normal  HENT: ear canals and TM's normal, nose and mouth without ulcers or lesions  NECK: no adenopathy, no asymmetry, masses, or scars  RESP: lungs clear to auscultation - no rales, rhonchi or wheezes  CV: regular rate and rhythm, normal S1 S2, no S3 or S4, no murmur, click or rub, no peripheral edema  ABDOMEN: soft, nontender, no hepatosplenomegaly, no masses and bowel sounds normal   (female) w/bimanual: normal female external genitalia, normal urethral meatus, normal vaginal mucosa, and normal cervix/adnexa/uterus without masses or discharge  MS: no gross musculoskeletal defects noted, no edema  SKIN: no suspicious lesions or rashes  NEURO: Normal strength and tone, mentation intact and speech normal  PSYCH: mentation appears normal, affect normal/bright    Signed Electronically by: Cristy Pleitez MD    "

## 2025-03-05 NOTE — RESULT ENCOUNTER NOTE
Excellent! Please call or send a [employer] message if you have any questions. Cristy Pleitez M.D.

## 2025-05-02 NOTE — PROGRESS NOTES
Cami Cardenas is a 30 year old female who is being evaluated via a patient initiated billable telephone visit.     What phone number would you like to be contacted at? [phone]  How would you like to obtain your AVS? Martin      Originating Location (pt. Location): in [place]      Distant Location (provider location):  On-site    Reason for telephone (audio-only): Patient did not consent to video    SUBJECTIVE:                                                   Cami Cardenas is a 30 year old female who presents for virtual visit today for the following health issue(s):  Patient presents with:  Follow Up: Follow up to ultrasound    Virtual Visit Details    Type of service:  Telephone Visit   Phone call duration: 10 minutes   Originating Location (pt. Location): Home    Distant Location (provider location):  On-site  Telephone visit completed due to the patient did not consent to a video visit.        HPI:  Still has occasional pelvic pain but is less frequent. Still intermittent cramping. Rarely has pain radiate into leg.    Mirena IUD place 2024    No LMP recorded. (Menstrual status: Birth Control)..     Patient is not sexually active, .  Using IUD for contraception.    reports that she has never smoked. She has never been exposed to tobacco smoke. She has never used smokeless tobacco.      Health maintenance updated:  yes    Today's PHQ-2 Score:       3/4/2025    10:30 AM   PHQ-2 (  [employer])   Q1: Little interest or pleasure in doing things 0   Q2: Feeling down, depressed or hopeless 0   PHQ-2 Score 0    Q1: Little interest or pleasure in doing things Not at all   Q2: Feeling down, depressed or hopeless Not at all   PHQ-2 Score 0       Patient-reported     Today's PHQ-9 Score:       2025     9:23 AM   PHQ-9 SCORE   PHQ-9 Total Score 0     Today's FLORENTIN-7 Score:       2025     9:23 AM   FLORENTIN-7 SCORE   Total Score 0       Problem list and histories reviewed & adjusted, as  indicated.  Additional history: as documented.    Patient Active Problem List   Diagnosis    Hematoma of leg    Sprain and strain of hip and thigh    Leg pain    Morbid obesity (H)    Social anxiety disorder     Past Surgical History:   Procedure Laterality Date    DENTAL SURGERY      dental implant      Social History     Tobacco Use    Smoking status: Never     Passive exposure: Never    Smokeless tobacco: Never   Substance Use Topics    Alcohol use: No      Problem (# of Occurrences) Relation (Name,Age of Onset)    Anxiety Disorder (1) Paternal Grandfather (Grandpa)    Mental Illness (2) Paternal Grandfather (Grandpa): Parkinson's and Dementia, Other (Great Aunt): Alzheimer's in various older Maternal relations    Arthritis (1) Mother (Mom)    Depression (1) Paternal Grandfather (Grandpa)    Diabetes (1) Paternal Grandmother (Paternal Grandmother)    Genetic Disorder (1) Mother (Mom): Celiac Disease    Hypertension (4) Maternal Grandmother (Grandma), Maternal Grandfather (Grandpa), Paternal Grandmother (Paternal Grandmother), Other (Great Aunt): Maternal Uncle    Cerebrovascular Disease (1) Other (Great Aunt): Paternal Great Grandmother    Obesity (1) Other (Great Aunt): Maternal Aunts (2)    Breast Cancer (1) Maternal Grandmother (Grandma)    Prostate Cancer (3) Father (Father): Had surgery to remove, currently cancer free as of 12/8/22, Maternal Grandfather (Grandpa), Paternal Grandfather (Grandpa)    High cholesterol (1) Father (Father)    Other Cancer (3) Maternal Grandmother (Grandma): Kidneys, Maternal Grandfather (Grandpa): Bone Cancer (C.O.D.), Other (Great Aunt): Maternal Great Uncle; Kidneys    Hyperlipidemia (4) Father (Father), Maternal Grandmother (Grandma), Maternal Grandfather (Grandpa), Other (Great Aunt): Maternal Aunt    Coronary Artery Disease (1) Other (Great Aunt): Paternal Great Grandfather    Colon Polyps (1) Father (Father): single adenomatous polyp age 61    Colon Cancer (1) Maternal  Grandmother (Grandma)              Current Outpatient Medications   Medication Sig Dispense Refill    diphenhydrAMINE (BENADRYL) 25 MG tablet Take 1-2 tablets (25-50 mg) by mouth every 6 hours as needed for itching or allergies 30 tablet 1    levonorgestrel (MIRENA) 52 MG (20 mcg/day) IUD 1 each by Intrauterine route once.      mometasone (NASONEX) 50 MCG/ACT nasal spray Spray 2 sprays into both nostrils at bedtime 17 g 3    olopatadine (PATANOL) 0.1 % ophthalmic solution INSTILL 1 DROP IN BOTH EYES TWICE DAILY Strength: 0.1 % 5 mL 1     No current facility-administered medications for this visit.     Allergies   Allergen Reactions    Dust Mites Unknown    Minthoxtachys Dizziness, Headache, Nausea and Nausea and Vomiting     Allergic to all mints         OBJECTIVE:     No vitals were obtained today due to virtual telephone visit.    Physical Exam  GENERAL: alert and no distress  RESP: No audible wheeze, cough, or visible cyanosis.    NEURO: Cranial nerves grossly intact.  Mentation and speech appropriate for age.  PSYCH: Appropriate affect, tone, and pace of words      ASSESSMENT/PLAN:                                                      Phone call duration: 10 minutes    No diagnosis found.    There are no Patient Instructions on file for this visit.    -no charge visit, no new information  -sent GI referral  -reviewed normal US results with patient, IUD in place,no abnormalities      ANNETTA Dillard [facility] [place]

## 2025-05-05 ENCOUNTER — ANCILLARY PROCEDURE (OUTPATIENT)
Dept: ULTRASOUND IMAGING | Facility: CLINIC | Age: 30
End: 2025-05-05
Payer: COMMERCIAL

## 2025-05-05 DIAGNOSIS — R10.2 PELVIC PAIN: ICD-10-CM

## 2025-05-05 PROCEDURE — 76377 3D RENDER W/INTRP POSTPROCES: CPT | Performed by: STUDENT IN AN ORGANIZED HEALTH CARE EDUCATION/TRAINING PROGRAM

## 2025-05-05 PROCEDURE — 76830 TRANSVAGINAL US NON-OB: CPT | Performed by: STUDENT IN AN ORGANIZED HEALTH CARE EDUCATION/TRAINING PROGRAM

## 2025-05-07 ENCOUNTER — VIRTUAL VISIT (OUTPATIENT)
Dept: OBGYN | Facility: CLINIC | Age: 30
End: 2025-05-07
Payer: COMMERCIAL

## 2025-05-07 DIAGNOSIS — R10.2 PELVIC PAIN: Primary | ICD-10-CM

## 2025-05-07 PROCEDURE — 99207 PR NO CHARGE LOS: CPT
